# Patient Record
Sex: MALE | Race: WHITE
[De-identification: names, ages, dates, MRNs, and addresses within clinical notes are randomized per-mention and may not be internally consistent; named-entity substitution may affect disease eponyms.]

---

## 2018-01-15 ENCOUNTER — HOSPITAL ENCOUNTER (EMERGENCY)
Dept: HOSPITAL 47 - EC | Age: 38
Discharge: HOME | End: 2018-01-15
Payer: COMMERCIAL

## 2018-01-15 VITALS
SYSTOLIC BLOOD PRESSURE: 168 MMHG | HEART RATE: 83 BPM | TEMPERATURE: 97.5 F | DIASTOLIC BLOOD PRESSURE: 101 MMHG | RESPIRATION RATE: 17 BRPM

## 2018-01-15 DIAGNOSIS — M54.12: Primary | ICD-10-CM

## 2018-01-15 DIAGNOSIS — Z79.899: ICD-10-CM

## 2018-01-15 PROCEDURE — 99283 EMERGENCY DEPT VISIT LOW MDM: CPT

## 2018-01-15 NOTE — ED
General Adult HPI





- General


Chief complaint: Back Pain/Injury


Stated complaint: BACK PAIN


Time Seen by Provider: 01/15/18 08:15


Source: patient, RN notes reviewed


Mode of arrival: ambulatory


Limitations: no limitations





- History of Present Illness


Initial comments: 





37-year-old male who presents emergency room today with chief complaint of pain 

to the left side of his neck.  He states that he has had some pain for last 3 

weeks started when he woke up 3 weeks ago.  He went to a chiropractor which did 

help some.  He states he was working yesterday has had more increased 

irritation was unable to sleep last night.  Patient states is worse with 

movements of the left shoulder he does have some radiation going down left arm 

into proximal forearm area.  States pain worse with rotation of the head and 

neck to the left and right.  He denies any other injuries or traumas.  Denies 

any other complaints associated symptoms. Patient denies any recent fever, 

chills, shortness of breath, chest pain, back pain, abdominal pain, nausea or 

vomiting, dysuria or hematuria, constipation or diarrhea, headaches or visual 

changes, or any other complaints.





- Related Data


 Home Medications











 Medication  Instructions  Recorded  Confirmed


 


Acetaminophen Tab [Tylenol Tab] 650 mg PO Q6H PRN 01/15/18 01/15/18


 


Ibuprofen [Motrin] 200 - 400 mg PO Q6HR PRN 01/15/18 01/15/18


 


Ranitidine HCl [Zantac] 150 mg PO BID 01/15/18 01/15/18








 Previous Rx's











 Medication  Instructions  Recorded


 


Cyclobenzaprine [Flexeril] 10 mg PO TID #20 tab 01/15/18


 


Dexamethasone 0.75 mg PO AS DIRECTED #12 tablet 01/15/18


 


Hydrocodone/Acetaminophen [Norco 1 each PO Q6HR PRN #12 tab 01/15/18





5-325]  


 


Ibuprofen [Motrin] 800 mg PO Q6HR #30 tab 01/15/18











 Allergies











Allergy/AdvReac Type Severity Reaction Status Date / Time


 


No Known Allergies Allergy   Verified 01/15/18 07:56














Review of Systems


ROS Statement: 


Those systems with pertinent positive or pertinent negative responses have been 

documented in the HPI.





ROS Other: All systems not noted in ROS Statement are negative.





Past Medical History


Past Medical History: No Reported History


History of Any Multi-Drug Resistant Organisms: None Reported


Past Surgical History: Cholecystectomy, Orthopedic Surgery


Additional Past Surgical History / Comment(s): sinus


Past Psychological History: No Psychological Hx Reported


Smoking Status: Never smoker


Past Alcohol Use History: None Reported


Past Drug Use History: None Reported





General Exam





- General Exam Comments


Initial Comments: 





General:  The patient is awake and alert, in no distress, and does not appear 

acutely ill. 


Eye:  Pupils are equal, round and reactive to light, extra-ocular movements are 

intact.  No nystagmus.  There is normal conjunctiva bilaterally.  No signs of 

icterus.  


Ears, nose, mouth and throat:  There are moist mucous membranes and no oral 

lesions. 


Neck:  The neck is supple, there is no tenderness or JVD.  


Cardiovascular:  There is a regular rate and rhythm. No murmur, rub or gallop 

is appreciated.


Respiratory:  Lungs are clear to auscultation, respirations are non-labored, 

breath sounds are equal.  No wheezes, stridor, rales, or rhonchi.


Musculoskeletal: Normal appearance of cervical, thoracic, lumbar spine with no 

step-offs forms.  No tenderness midline.  Patient does have tenderness in the 

trapezius area on the left.  Pain reproduced with certain movements and on 

palpation.  Strength 5/5. Sensation intact. Pulses equal bilaterally 2+.  


Neurological:  A&O x 3. CN II-XII intact, There are no obvious motor or sensory 

deficits. Coordination appears grossly intact. Speech is normal.


Skin:  Skin is warm and dry and no rashes or lesions are noted. 


Psychiatric:  Cooperative, appropriate mood & affect, normal judgment.  


Limitations: no limitations





Course





 Vital Signs











  01/15/18





  06:45


 


Temperature 97.5 F L


 


Pulse Rate 83


 


Respiratory 17





Rate 


 


Blood Pressure 168/101


 


O2 Sat by Pulse 96





Oximetry 














Medical Decision Making





- Medical Decision Making





Patient will be treated with pain medication, anti-inflammatories, 

musculoskeletal and steroids.  He is advised follow family doctor or 

orthopedics in the next 2 days to have further evaluation possible MRI.  

Patient must return to the emergency room symptoms increase worsen or for any 

other concerns.  He states understanding and is in agreement.





Disposition


Clinical Impression: 


 Cervical radiculopathy





Disposition: HOME SELF-CARE


Condition: Good


Instructions:  Cervical Radiculopathy (ED)


Additional Instructions: 


Please use medication as discussed.  Please follow-up with orthopedic/family 

doctor in the next 2 days of symptoms have not improved.  Please return to 

emergency room if the symptoms increase or worsen or for any other concerns.


Prescriptions: 


Cyclobenzaprine [Flexeril] 10 mg PO TID #20 tab


Dexamethasone 0.75 mg PO AS DIRECTED #12 tablet


Hydrocodone/Acetaminophen [Norco 5-325] 1 each PO Q6HR PRN #12 tab


 PRN Reason: Pain


Ibuprofen [Motrin] 800 mg PO Q6HR #30 tab


Referrals: 


Adarsh Mark MD [Primary Care Provider] - 1-2 days


MERLYN Alejandro DO [Doctor of Osteopathic Medicine] - 1-2 days


Time of Disposition: 08:28

## 2019-11-27 ENCOUNTER — HOSPITAL ENCOUNTER (EMERGENCY)
Dept: HOSPITAL 47 - EC | Age: 39
Discharge: HOME | End: 2019-11-27
Payer: COMMERCIAL

## 2019-11-27 VITALS — DIASTOLIC BLOOD PRESSURE: 97 MMHG | TEMPERATURE: 98 F | HEART RATE: 86 BPM | SYSTOLIC BLOOD PRESSURE: 151 MMHG

## 2019-11-27 VITALS — RESPIRATION RATE: 16 BRPM

## 2019-11-27 DIAGNOSIS — S29.011A: Primary | ICD-10-CM

## 2019-11-27 DIAGNOSIS — Z79.51: ICD-10-CM

## 2019-11-27 DIAGNOSIS — I45.10: ICD-10-CM

## 2019-11-27 LAB
ANION GAP SERPL CALC-SCNC: 11 MMOL/L
APTT BLD: 25.8 SEC (ref 22–30)
BASOPHILS # BLD AUTO: 0.1 K/UL (ref 0–0.2)
BASOPHILS NFR BLD AUTO: 1 %
BUN SERPL-SCNC: 23 MG/DL (ref 9–20)
CALCIUM SPEC-MCNC: 10.7 MG/DL (ref 8.4–10.2)
CHLORIDE SERPL-SCNC: 105 MMOL/L (ref 98–107)
CO2 SERPL-SCNC: 26 MMOL/L (ref 22–30)
D DIMER PPP FEU-MCNC: <0.17 MG/L FEU (ref ?–0.6)
EOSINOPHIL # BLD AUTO: 0.1 K/UL (ref 0–0.7)
EOSINOPHIL NFR BLD AUTO: 1 %
ERYTHROCYTE [DISTWIDTH] IN BLOOD BY AUTOMATED COUNT: 4.59 M/UL (ref 4.3–5.9)
ERYTHROCYTE [DISTWIDTH] IN BLOOD: 12.6 % (ref 11.5–15.5)
GLUCOSE SERPL-MCNC: 119 MG/DL (ref 74–99)
HCT VFR BLD AUTO: 42.2 % (ref 39–53)
HGB BLD-MCNC: 14.8 GM/DL (ref 13–17.5)
INR PPP: 0.9 (ref ?–1.2)
LYMPHOCYTES # SPEC AUTO: 1.9 K/UL (ref 1–4.8)
LYMPHOCYTES NFR SPEC AUTO: 19 %
MAGNESIUM SPEC-SCNC: 2 MG/DL (ref 1.6–2.3)
MCH RBC QN AUTO: 32.2 PG (ref 25–35)
MCHC RBC AUTO-ENTMCNC: 35.1 G/DL (ref 31–37)
MCV RBC AUTO: 91.8 FL (ref 80–100)
MONOCYTES # BLD AUTO: 0.5 K/UL (ref 0–1)
MONOCYTES NFR BLD AUTO: 5 %
NEUTROPHILS # BLD AUTO: 7.4 K/UL (ref 1.3–7.7)
NEUTROPHILS NFR BLD AUTO: 72 %
PLATELET # BLD AUTO: 277 K/UL (ref 150–450)
POTASSIUM SERPL-SCNC: 4.1 MMOL/L (ref 3.5–5.1)
PT BLD: 10 SEC (ref 9–12)
SODIUM SERPL-SCNC: 142 MMOL/L (ref 137–145)
WBC # BLD AUTO: 10.2 K/UL (ref 3.8–10.6)

## 2019-11-27 PROCEDURE — 83735 ASSAY OF MAGNESIUM: CPT

## 2019-11-27 PROCEDURE — 80048 BASIC METABOLIC PNL TOTAL CA: CPT

## 2019-11-27 PROCEDURE — 85379 FIBRIN DEGRADATION QUANT: CPT

## 2019-11-27 PROCEDURE — 71046 X-RAY EXAM CHEST 2 VIEWS: CPT

## 2019-11-27 PROCEDURE — 99285 EMERGENCY DEPT VISIT HI MDM: CPT

## 2019-11-27 PROCEDURE — 93005 ELECTROCARDIOGRAM TRACING: CPT

## 2019-11-27 PROCEDURE — 85730 THROMBOPLASTIN TIME PARTIAL: CPT

## 2019-11-27 PROCEDURE — 84484 ASSAY OF TROPONIN QUANT: CPT

## 2019-11-27 PROCEDURE — 85610 PROTHROMBIN TIME: CPT

## 2019-11-27 PROCEDURE — 36415 COLL VENOUS BLD VENIPUNCTURE: CPT

## 2019-11-27 PROCEDURE — 85025 COMPLETE CBC W/AUTO DIFF WBC: CPT

## 2019-11-27 NOTE — XR
EXAMINATION TYPE: XR chest 2V

 

DATE OF EXAM: 11/27/2019

 

COMPARISON: None

 

INDICATION: Abnormal EKG

 

TECHNIQUE:  Frontal and lateral views of the chest are obtained.

 

FINDINGS:  

The heart size is normal.  

The pulmonary vasculature is normal.

The lungs are clear.

 

IMPRESSION:  

1. No acute pulmonary process.

## 2019-11-27 NOTE — ED
General Adult HPI





- General


Chief complaint: Recheck/Abnormal Lab/Rx


Stated complaint: abn EKG


Time Seen by Provider: 11/27/19 09:08


Source: patient


Mode of arrival: ambulatory


Limitations: no limitations





- History of Present Illness


Initial comments: 





Dictation was produced using dragon dictation software. please excuse any 

grammatical, word or spelling errors. 





Chief Complaint: 39-year-old male sent in from urgent care for abnormal EKG.





History of Present Illness:-year-old male U was seen at the urgent care earlier 

today.  He was seen at the clinic for chest pain and shortness of breath.  

Patient has been having URI type symptoms with cough productive of green sputum.

 Patient also has been having runny nose.  Patient was evaluated at the urgent 

care and EKG was performed showing abnormality.  Patient was then referred to 

come to the emergency department for further care.  Patient states he has some 

mild chest pain that is sharp and worse with coughing..  No radiation.  No 

associated diaphoresis or nausea.  Patient has a history of blood clots.  Denies

any lower extremity symptoms.








The ROS documented in this emergency department record has been reviewed and 

confirmed by me.  Those systems with pertinent positive or negative responses 

have been documented in the HPI.  All other systems are other negative and/or 

noncontributory.








PHYSICAL EXAM:


General Impression: Alert and oriented x3, not in acute distress


HEENT: Normocephalic atraumatic, extra-ocular movements intact, pupils equal and

reactive to light bilaterally, mucous membranes moist.


Cardiovascular: Heart regular rate and rhythm, S1&S2 audible, no murmurs, rubs 

or gallops


Chest: Lungs clear to auscultation bilaterally, no rhonchi, no wheeze, no rales


Abdomen: Bowel sounds present, abdomen soft, non-tender, non-distended, no 

organomegaly


Musculoskeletal: Pulses present and equal in all extremities, no peripheral 

edema


Motor:  no focal deficits noted


Neurological: CN II-XII grossly intact, no focal motor or sensory deficits noted


Skin: Intact with no visualized rashes


Psych: Normal affect and mood





ED course: 39 old male presents with abnormal EKG.  He reports URI symptoms.  

Upon arrival are within acceptable limits.  EKG from clinic was reviewed showing

incomplete.  On a branch block with a QRS of 107.  There appears to be some ST 

depressions in the lateral precordial leads.  EKG performed at our facility 

showing redemonstration of incomplete right bundle branch block without any 

signs of ischemia or infarction.  Laboratory evaluation obtained found to be 

unremarkable.  D-dimer is negative.  Troponins negative.  for pulmonary embolus.

 patient's pain is atypical.  ekg appears benign.  patient told to follow up 

with primary care physician upon discharge.  is given prescriptions for 

zithromax pack.  Return parameters discussed.  All questions answered.  Patient 

clear for discharge.








EKG interpretation: Ventricular rate 94, normal sinus rhythm, and complete right

bundle branch block, WI interval 140, care is 102, QTc 455. No WI prolongation, 

no QTC prolongation, no ST or T-wave changes noted.  No old EKG for comparison. 

Overall, this EKG is unremarkable








- Related Data


                                Home Medications











 Medication  Instructions  Recorded  Confirmed


 


Acetaminophen Tab [Tylenol Tab] 1,000 mg PO Q6HR PRN 11/27/19 11/27/19


 


Fluticasone/Vilanterol [Breo 1 puff INHALATION RT-DAILY 11/27/19 11/27/19





Ellipta 100-25 Mcg Inhaler]   


 


Ibuprofen [Motrin Ib] 800 mg PO Q6H PRN 11/27/19 11/27/19








                                  Previous Rx's











 Medication  Instructions  Recorded


 


Azithromycin [Zithromax Z-pack] 0 mg PO AS DIRECTED #6 tab 11/27/19











                                    Allergies











Allergy/AdvReac Type Severity Reaction Status Date / Time


 


No Known Allergies Allergy   Verified 11/27/19 10:14














Review of Systems


ROS Statement: 


Those systems with pertinent positive or pertinent negative responses have been 

documented in the HPI.





ROS Other: All systems not noted in ROS Statement are negative.





Past Medical History


Past Medical History: No Reported History


History of Any Multi-Drug Resistant Organisms: None Reported


Past Surgical History: Cholecystectomy, Orthopedic Surgery


Additional Past Surgical History / Comment(s): sinus


Past Psychological History: No Psychological Hx Reported


Smoking Status: Never smoker


Past Alcohol Use History: None Reported


Past Drug Use History: None Reported





General Exam


Limitations: no limitations





Course


                                   Vital Signs











  11/27/19 11/27/19





  09:04 09:25


 


Temperature 98.3 F 


 


Pulse Rate 90 84


 


Respiratory 18 16





Rate  


 


Blood Pressure 146/89 


 


O2 Sat by Pulse 99 99





Oximetry  














Medical Decision Making





- Lab Data


Result diagrams: 


                                 11/27/19 09:22





                                 11/27/19 09:22


                                   Lab Results











  11/27/19 11/27/19 11/27/19 Range/Units





  09:22 09:22 09:22 


 


WBC   10.2   (3.8-10.6)  k/uL


 


RBC   4.59   (4.30-5.90)  m/uL


 


Hgb   14.8   (13.0-17.5)  gm/dL


 


Hct   42.2   (39.0-53.0)  %


 


MCV   91.8   (80.0-100.0)  fL


 


MCH   32.2   (25.0-35.0)  pg


 


MCHC   35.1   (31.0-37.0)  g/dL


 


RDW   12.6   (11.5-15.5)  %


 


Plt Count   277   (150-450)  k/uL


 


Neutrophils %   72   %


 


Lymphocytes %   19   %


 


Monocytes %   5   %


 


Eosinophils %   1   %


 


Basophils %   1   %


 


Neutrophils #   7.4   (1.3-7.7)  k/uL


 


Lymphocytes #   1.9   (1.0-4.8)  k/uL


 


Monocytes #   0.5   (0-1.0)  k/uL


 


Eosinophils #   0.1   (0-0.7)  k/uL


 


Basophils #   0.1   (0-0.2)  k/uL


 


PT    10.0  (9.0-12.0)  sec


 


INR    0.9  (<1.2)  


 


APTT    25.8  (22.0-30.0)  sec


 


D-Dimer    <0.17  (<0.60)  mg/L FEU


 


Sodium  142    (137-145)  mmol/L


 


Potassium  4.1    (3.5-5.1)  mmol/L


 


Chloride  105    ()  mmol/L


 


Carbon Dioxide  26    (22-30)  mmol/L


 


Anion Gap  11    mmol/L


 


BUN  23 H    (9-20)  mg/dL


 


Creatinine  0.84    (0.66-1.25)  mg/dL


 


Est GFR (CKD-EPI)AfAm  >90    (>60 ml/min/1.73 sqM)  


 


Est GFR (CKD-EPI)NonAf  >90    (>60 ml/min/1.73 sqM)  


 


Glucose  119 H    (74-99)  mg/dL


 


Calcium  10.7 H    (8.4-10.2)  mg/dL


 


Magnesium  2.0    (1.6-2.3)  mg/dL


 


Troponin I     (0.000-0.034)  ng/mL














  11/27/19 Range/Units





  09:22 


 


WBC   (3.8-10.6)  k/uL


 


RBC   (4.30-5.90)  m/uL


 


Hgb   (13.0-17.5)  gm/dL


 


Hct   (39.0-53.0)  %


 


MCV   (80.0-100.0)  fL


 


MCH   (25.0-35.0)  pg


 


MCHC   (31.0-37.0)  g/dL


 


RDW   (11.5-15.5)  %


 


Plt Count   (150-450)  k/uL


 


Neutrophils %   %


 


Lymphocytes %   %


 


Monocytes %   %


 


Eosinophils %   %


 


Basophils %   %


 


Neutrophils #   (1.3-7.7)  k/uL


 


Lymphocytes #   (1.0-4.8)  k/uL


 


Monocytes #   (0-1.0)  k/uL


 


Eosinophils #   (0-0.7)  k/uL


 


Basophils #   (0-0.2)  k/uL


 


PT   (9.0-12.0)  sec


 


INR   (<1.2)  


 


APTT   (22.0-30.0)  sec


 


D-Dimer   (<0.60)  mg/L FEU


 


Sodium   (137-145)  mmol/L


 


Potassium   (3.5-5.1)  mmol/L


 


Chloride   ()  mmol/L


 


Carbon Dioxide   (22-30)  mmol/L


 


Anion Gap   mmol/L


 


BUN   (9-20)  mg/dL


 


Creatinine   (0.66-1.25)  mg/dL


 


Est GFR (CKD-EPI)AfAm   (>60 ml/min/1.73 sqM)  


 


Est GFR (CKD-EPI)NonAf   (>60 ml/min/1.73 sqM)  


 


Glucose   (74-99)  mg/dL


 


Calcium   (8.4-10.2)  mg/dL


 


Magnesium   (1.6-2.3)  mg/dL


 


Troponin I  <0.012  (0.000-0.034)  ng/mL














Disposition


Clinical Impression: 


 Chest wall muscle strain





Disposition: HOME SELF-CARE


Condition: Good


Instructions (If sedation given, give patient instructions):  Chest Pain (ED)


Prescriptions: 


Azithromycin [Zithromax Z-pack] 0 mg PO AS DIRECTED #6 tab


Is patient prescribed a controlled substance at d/c from ED?: No


Referrals: 


Kale Bauman MD [Primary Care Provider] - 1-2 days


Time of Disposition: 11:01

## 2021-06-02 ENCOUNTER — HOSPITAL ENCOUNTER (OUTPATIENT)
Dept: HOSPITAL 47 - LABWHC1 | Age: 41
Discharge: HOME | End: 2021-06-02
Attending: FAMILY MEDICINE
Payer: COMMERCIAL

## 2021-06-02 DIAGNOSIS — E23.0: ICD-10-CM

## 2021-06-02 DIAGNOSIS — T78.1XXA: ICD-10-CM

## 2021-06-02 DIAGNOSIS — Z00.00: Primary | ICD-10-CM

## 2021-06-02 LAB
BASOPHILS # BLD AUTO: 0.09 X 10*3/UL (ref 0–0.1)
BASOPHILS NFR BLD AUTO: 0.7 %
EOSINOPHIL # BLD AUTO: 0.08 X 10*3/UL (ref 0.04–0.35)
EOSINOPHIL NFR BLD AUTO: 0.6 %
ERYTHROCYTE [DISTWIDTH] IN BLOOD BY AUTOMATED COUNT: 4.92 X 10*6/UL (ref 4.4–5.6)
ERYTHROCYTE [DISTWIDTH] IN BLOOD: 13.2 % (ref 11.5–14.5)
GLIADIN IGA SER-ACNC: <0.2 U/ML
GLIADIN PEPTIDE IGA SER-ACNC: NEGATIVE
GLIADIN PEPTIDE IGG SER-ACNC: NEGATIVE
HBA1C MFR BLD: 5.5 % (ref 4–6)
HCT VFR BLD AUTO: 46.8 % (ref 39.6–50)
HGB BLD-MCNC: 15 G/DL (ref 13–17)
LYMPHOCYTES # SPEC AUTO: 3.48 X 10*3/UL (ref 0.9–5)
LYMPHOCYTES NFR SPEC AUTO: 27.2 %
MCH RBC QN AUTO: 30.5 PG (ref 27–32)
MCHC RBC AUTO-ENTMCNC: 32.1 G/DL (ref 32–37)
MCV RBC AUTO: 95.1 FL (ref 80–97)
MONOCYTES # BLD AUTO: 0.84 X 10*3/UL (ref 0.2–1)
MONOCYTES NFR BLD AUTO: 6.6 %
NEUTROPHILS # BLD AUTO: 7.97 X 10*3/UL (ref 1.8–7.7)
NEUTROPHILS NFR BLD AUTO: 62.2 %
PLATELET # BLD AUTO: 267 X 10*3/UL (ref 140–440)
WBC # BLD AUTO: 12.8 X 10*3/UL (ref 4.5–10)

## 2021-06-02 PROCEDURE — 83036 HEMOGLOBIN GLYCOSYLATED A1C: CPT

## 2021-06-02 PROCEDURE — 80053 COMPREHEN METABOLIC PANEL: CPT

## 2021-06-02 PROCEDURE — 83516 IMMUNOASSAY NONANTIBODY: CPT

## 2021-06-02 PROCEDURE — 84402 ASSAY OF FREE TESTOSTERONE: CPT

## 2021-06-02 PROCEDURE — 82672 ASSAY OF ESTROGEN: CPT

## 2021-06-02 PROCEDURE — 84443 ASSAY THYROID STIM HORMONE: CPT

## 2021-06-02 PROCEDURE — 82785 ASSAY OF IGE: CPT

## 2021-06-02 PROCEDURE — 84153 ASSAY OF PSA TOTAL: CPT

## 2021-06-02 PROCEDURE — 85025 COMPLETE CBC W/AUTO DIFF WBC: CPT

## 2021-06-02 PROCEDURE — 36415 COLL VENOUS BLD VENIPUNCTURE: CPT

## 2021-06-02 PROCEDURE — 84403 ASSAY OF TOTAL TESTOSTERONE: CPT

## 2021-06-02 PROCEDURE — 80061 LIPID PANEL: CPT

## 2021-06-02 PROCEDURE — 86001 ALLERGEN SPECIFIC IGG: CPT

## 2021-06-03 LAB
ALBUMIN SERPL-MCNC: 4.7 G/DL (ref 3.8–4.9)
ALBUMIN/GLOB SERPL: 1.88 G/DL (ref 1.6–3.17)
ALP SERPL-CCNC: 61 U/L (ref 41–126)
ALT SERPL-CCNC: 114 U/L (ref 10–49)
ANION GAP SERPL CALC-SCNC: 13 MMOL/L (ref 4–12)
AST SERPL-CCNC: 68 U/L (ref 14–35)
BUN SERPL-SCNC: 25 MG/DL (ref 9–27)
BUN/CREAT SERPL: 25 RATIO (ref 12–20)
CALCIUM SPEC-MCNC: 10 MG/DL (ref 8.7–10.3)
CHLORIDE SERPL-SCNC: 105 MMOL/L (ref 96–109)
CHOLEST SERPL-MCNC: 181 MG/DL (ref 0–200)
CO2 SERPL-SCNC: 27 MMOL/L (ref 21.6–31.8)
GLOBULIN SER CALC-MCNC: 2.5 G/DL (ref 1.6–3.3)
GLUCOSE SERPL-MCNC: 96 MG/DL (ref 70–110)
HDLC SERPL-MCNC: 49 MG/DL (ref 40–60)
LDLC SERPL CALC-MCNC: 108.2 MG/DL (ref 0–131)
POTASSIUM SERPL-SCNC: 4.5 MMOL/L (ref 3.5–5.5)
PROT SERPL-MCNC: 7.2 G/DL (ref 6.2–8.2)
PSA SERPL-MCNC: 0.6 NG/ML (ref 0–2.5)
SODIUM SERPL-SCNC: 145 MMOL/L (ref 135–145)
TRIGL SERPL-MCNC: 119 MG/DL (ref 0–149)
VLDLC SERPL CALC-MCNC: 23.8 MG/DL (ref 5–40)

## 2021-06-04 LAB
COW MILK IGG-MCNC: 52.1 MCG/ML (ref ?–2)
WHEAT IGG-MCNC: 4 MCG/ML (ref ?–2)

## 2021-06-14 ENCOUNTER — HOSPITAL ENCOUNTER (EMERGENCY)
Dept: HOSPITAL 47 - EC | Age: 41
Discharge: HOME | End: 2021-06-14
Payer: COMMERCIAL

## 2021-06-14 VITALS
HEART RATE: 88 BPM | SYSTOLIC BLOOD PRESSURE: 143 MMHG | DIASTOLIC BLOOD PRESSURE: 96 MMHG | TEMPERATURE: 97.9 F | RESPIRATION RATE: 20 BRPM

## 2021-06-14 DIAGNOSIS — M54.16: Primary | ICD-10-CM

## 2021-06-14 PROCEDURE — 99283 EMERGENCY DEPT VISIT LOW MDM: CPT

## 2021-06-14 PROCEDURE — 72100 X-RAY EXAM L-S SPINE 2/3 VWS: CPT

## 2021-06-14 PROCEDURE — 96372 THER/PROPH/DIAG INJ SC/IM: CPT

## 2021-06-14 NOTE — XR
EXAMINATION TYPE: XR lumbar spine 2 or 3V

 

DATE OF EXAM: 6/14/2021

 

COMPARISON: NONE

 

HISTORY: Low back pain

 

TECHNIQUE: 3 views lumbar spine

 

FINDINGS: 

There are 4 nonrib-bearing lumbar-type vertebral bodies. Vertebral body heights are preserved. Multil
evel endplate sclerosis and osteophytosis is noted predominantly at the thoracolumbar junction.

 

IMPRESSION: 

1. 4 nonrib-bearing lumbar type vertebral bodies.

2. Multilevel disc disease.

## 2021-06-14 NOTE — ED
Back Pain HPI





- General


Chief Complaint: Back Pain/Injury


Stated Complaint: back pain


Time Seen by Provider: 06/14/21 13:55


Source: patient, EMS


Limitations: no limitations





- History of Present Illness


Initial Comments: 





40-year-old male presents to the emergency department with a chief complaint of 

back pain.  He has history of chronic back pain that comes and goes.  States thi

s most recent bout started approximately 2 weeks ago with gradual increase in 

severity.  States it is located on the left paraspinal lumbar region with 

radiation to the left lower extremity.  He states he is not able to ambulate.  

Pain exacerbated with any weightbearing or twisting motion on his back.  He 

denies any direct injuries to the back.  Denies saddle anesthesia, urinary 

retention with overflow or bowel incontinence.





- Related Data


                                Home Medications











 Medication  Instructions  Recorded  Confirmed


 


Cyclobenzaprine [Flexeril] 10 mg PO Q8H PRN 06/14/21 06/14/21


 


Ibuprofen [Motrin] 800 mg PO Q8H PRN 06/14/21 06/14/21


 


Omeprazole 20 mg PO DAILY 06/14/21 06/14/21











                                    Allergies











Allergy/AdvReac Type Severity Reaction Status Date / Time


 


No Known Allergies Allergy   Verified 06/14/21 13:57














Review of Systems


ROS Statement: 


Those systems with pertinent positive or pertinent negative responses have been 

documented in the HPI.





ROS Other: All systems not noted in ROS Statement are negative.





Past Medical History


Past Medical History: No Reported History


History of Any Multi-Drug Resistant Organisms: None Reported


Past Surgical History: Cholecystectomy, Orthopedic Surgery


Additional Past Surgical History / Comment(s): sinus


Past Psychological History: No Psychological Hx Reported


Past Alcohol Use History: None Reported


Past Drug Use History: None Reported





General Exam


Limitations: no limitations


General appearance: alert, in no apparent distress, obese


Head exam: Present: atraumatic, normocephalic, normal inspection


Eye exam: Present: normal appearance, PERRL, EOMI


Pupils: Present: normal accommodation


ENT exam: Present: normal exam, normal oropharynx, mucous membranes moist


Neck exam: Present: normal inspection, full ROM.  Absent: tenderness, 

lymphadenopathy


Respiratory exam: Present: normal lung sounds bilaterally.  Absent: respiratory 

distress


Cardiovascular Exam: Present: regular rate, normal rhythm, normal heart sounds. 

 Absent: systolic murmur


GI/Abdominal exam: Present: soft.  Absent: distended, tenderness, guarding, 

rigid


Extremities exam: Present: normal inspection, full ROM, normal capillary refill.

  Absent: tenderness


Back exam: Present: normal inspection, full ROM, tenderness, paraspinal 

tenderness (left paraspinal tenderness), other (Positive leg raise test)


Neurological exam: Present: alert, oriented X3


Psychiatric exam: Present: normal affect, normal mood


Skin exam: Present: warm, dry, intact, normal color





Course


                                   Vital Signs











  06/14/21





  13:36


 


Temperature 97.9 F


 


Pulse Rate 88


 


Respiratory 20





Rate 


 


Blood Pressure 143/96


 


O2 Sat by Pulse 95





Oximetry 














Disposition


Clinical Impression: 


 Mechanical back pain, Lumbar radiculopathy





Disposition: HOME SELF-CARE


Condition: Stable


Instructions (If sedation given, give patient instructions):  Acute Low Back 

Pain (ED)


Additional Instructions: 


Please return to the Emergency Department if symptoms worsen or any other 

concerns.  Follow-up with orthopedic specialist.  Take prescribed medication as 

directed.


Is patient prescribed a controlled substance at d/c from ED?: No


Referrals: 


Kale Bauman MD [Primary Care Provider] - 1-2 days


Rodolfo Kauffman DO [Doctor of Osteopathic Medicine] - 1-2 days


Time of Disposition: 15:11

## 2021-06-28 ENCOUNTER — HOSPITAL ENCOUNTER (OUTPATIENT)
Dept: HOSPITAL 47 - RADMRIMAIN | Age: 41
Discharge: HOME | End: 2021-06-28
Attending: FAMILY MEDICINE
Payer: COMMERCIAL

## 2021-06-28 DIAGNOSIS — M51.17: Primary | ICD-10-CM

## 2021-06-28 PROCEDURE — 72148 MRI LUMBAR SPINE W/O DYE: CPT

## 2021-06-29 NOTE — MR
EXAMINATION TYPE: MR lumbar spine wo con

 

DATE OF EXAM: 6/28/2021

 

COMPARISON: None

 

HISTORY: LBP, LLE radiculopathy.

 

Multiplanar multiecho imaging of the lumbar spine was performed without contrast.

 

The vertebra have normal alignment. There is some decreased signal in the L5-S1 disc. Disc spaces ove
rall are fairly normal. There is no compression fracture. There is no paraspinal mass. Sacroiliac baldemar
nts are intact. The posterior elements are intact. There is a small posterior disc bulge at L5-S1.

 

IMPRESSION:

Small posterior disc bulging at L5-S1. No fracture. There is developmentally small spinal canal and t
his patient is at higher risk for acquired spinal stenosis.

## 2022-08-30 ENCOUNTER — HOSPITAL ENCOUNTER (EMERGENCY)
Dept: HOSPITAL 47 - EC | Age: 42
Discharge: HOME | End: 2022-08-30
Payer: COMMERCIAL

## 2022-08-30 VITALS
DIASTOLIC BLOOD PRESSURE: 71 MMHG | RESPIRATION RATE: 18 BRPM | HEART RATE: 98 BPM | TEMPERATURE: 98.9 F | SYSTOLIC BLOOD PRESSURE: 108 MMHG

## 2022-08-30 DIAGNOSIS — K57.32: Primary | ICD-10-CM

## 2022-08-30 LAB
BASOPHILS # BLD AUTO: 0 K/UL (ref 0–0.2)
BASOPHILS NFR BLD AUTO: 0 %
EOSINOPHIL # BLD AUTO: 0.3 K/UL (ref 0–0.7)
EOSINOPHIL NFR BLD AUTO: 2 %
ERYTHROCYTE [DISTWIDTH] IN BLOOD BY AUTOMATED COUNT: 5.11 M/UL (ref 4.3–5.9)
ERYTHROCYTE [DISTWIDTH] IN BLOOD: 13.2 % (ref 11.5–15.5)
HCT VFR BLD AUTO: 48.4 % (ref 39–53)
HGB BLD-MCNC: 16 GM/DL (ref 13–17.5)
LYMPHOCYTES # SPEC AUTO: 1 K/UL (ref 1–4.8)
LYMPHOCYTES NFR SPEC AUTO: 5 %
MCH RBC QN AUTO: 31.2 PG (ref 25–35)
MCHC RBC AUTO-ENTMCNC: 33 G/DL (ref 31–37)
MCV RBC AUTO: 94.7 FL (ref 80–100)
MONOCYTES # BLD AUTO: 0.5 K/UL (ref 0–1)
MONOCYTES NFR BLD AUTO: 3 %
NEUTROPHILS # BLD AUTO: 16.4 K/UL (ref 1.3–7.7)
NEUTROPHILS NFR BLD AUTO: 90 %
PLATELET # BLD AUTO: 229 K/UL (ref 150–450)
WBC # BLD AUTO: 18.2 K/UL (ref 3.8–10.6)

## 2022-08-30 PROCEDURE — 96374 THER/PROPH/DIAG INJ IV PUSH: CPT

## 2022-08-30 PROCEDURE — 74177 CT ABD & PELVIS W/CONTRAST: CPT

## 2022-08-30 PROCEDURE — 85025 COMPLETE CBC W/AUTO DIFF WBC: CPT

## 2022-08-30 PROCEDURE — 36415 COLL VENOUS BLD VENIPUNCTURE: CPT

## 2022-08-30 PROCEDURE — 96361 HYDRATE IV INFUSION ADD-ON: CPT

## 2022-08-30 PROCEDURE — 99284 EMERGENCY DEPT VISIT MOD MDM: CPT

## 2022-08-30 PROCEDURE — 96375 TX/PRO/DX INJ NEW DRUG ADDON: CPT

## 2022-08-30 NOTE — CT
EXAMINATION TYPE: CT abdomen pelvis w con

CT DLP: 2696.4 mGycm, Automated exposure control for dose reduction was used.

 

DATE OF EXAM: 8/30/2022 8:34 AM

 

COMPARISON: None

 

CLINICAL INDICATION:Male, 41 years old with history of abdominal pain; Abdominal pain

 

TECHNIQUE:  Axial CT of the abdomen and pelvis. Sagittal and coronal reformats were created on a [employer] workstation. 

 

Contrast used:100 mL of Isovue 300 with IV Contrast, 

Oral contrast used: without Oral Contrast 

 

FINDINGS: 

LOWER CHEST: Unremarkable

 

ABDOMEN

LIVER: Diffusely hypoattenuating parenchyma.

GALLBLADDER AND BILE DUCTS: The gallbladder is surgically absent.

PANCREAS: Unremarkable.

SPLEEN: Unremarkable.

ADRENAL GLANDS: Unremarkable.

KIDNEYS AND URETERS: No evidence of hydronephrosis or renal calculus. The ureters are unremarkable.  
Right renal cyst.

 

PELVIS

BLADDER: Unremarkable

REPRODUCTIVE: Unremarkable.

 

ABDOMEN & PELVIS

STOMACH AND BOWEL: There are colonic diverticula present, one of which has adjacent fat stranding felecia
nges. No organizing fluid collection or evidence of pneumoperitoneum. No evidence of bowel obstructio
n. 

PERITONEUM: No evidence of pneumoperitoneum or free fluid.

 

VASCULATURE: No evidence of aortic aneurysm. 

MUSCULOSKELETAL: No acute osseous abnormalities

LYMPH NODES: No gross evidence for lymphadenopathy.

SOFT TISSUE/ABDOMINAL WALL: Bilateral fat filled inguinal hernias. Small fat-containing umbilical her
moncho.

 

IMPRESSION:

1.  Acute uncomplicated sigmoid colon diverticulitis.

## 2022-08-30 NOTE — ED
General Adult HPI





- General


Stated complaint: Abdominal Pain


Time Seen by Provider: 08/30/22 06:35


Source: patient, EMS


Mode of arrival: EMS


Limitations: no limitations





- History of Present Illness


Initial comments: 


41-year-old male presents emergency department via EMS with chief complaint of 

abdominal pain.  Patient states that pain started yesterday worsened throughout 

the night.  Patient states started in his upper abdomen states is not located in

her lower abdomen bilaterally and rates his back.  Patient states severe pain is

did receive fentanyl by EMS which minimally helped.  Patient has no dysuria no 

hematuria.  Patient states that he has had a prior cholecystomy, appendectomy.  

Patient denies any fevers or chills slight nausea was given Zofran.  No chest 

pain or shortness breath at this time.








- Related Data


                                Home Medications











 Medication  Instructions  Recorded  Confirmed


 


Cyclobenzaprine [Flexeril] 10 mg PO Q8H PRN 06/14/21 06/14/21


 


Ibuprofen [Motrin] 800 mg PO Q8H PRN 06/14/21 06/14/21


 


Omeprazole 20 mg PO DAILY 06/14/21 06/14/21








                                  Previous Rx's











 Medication  Instructions  Recorded


 


Amoxic-Pot Clav 875-125Mg 1 tab PO Q12HR #20 tab 08/30/22





[Augmentin 875-125]  


 


Ondansetron Odt [Zofran Odt] 4 mg PO Q8HR PRN #10 tab 08/30/22











                                    Allergies











Allergy/AdvReac Type Severity Reaction Status Date / Time


 


No Known Allergies Allergy   Verified 06/14/21 13:57














Review of Systems


ROS Statement: 


Those systems with pertinent positive or pertinent negative responses have been 

documented in the HPI.





ROS Other: All systems not noted in ROS Statement are negative.





Past Medical History


Past Medical History: No Reported History


History of Any Multi-Drug Resistant Organisms: None Reported


Past Surgical History: Cholecystectomy, Orthopedic Surgery


Additional Past Surgical History / Comment(s): sinus


Past Psychological History: No Psychological Hx Reported


Past Alcohol Use History: None Reported


Past Drug Use History: None Reported





General Exam


General appearance: alert, in no apparent distress


Head exam: Present: atraumatic, normocephalic, normal inspection


Eye exam: Present: normal appearance, PERRL, EOMI.  Absent: scleral icterus, 

conjunctival injection, periorbital swelling


ENT exam: Present: normal exam, normal oropharynx, mucous membranes moist


Neck exam: Present: normal inspection, full ROM.  Absent: tenderness, 

meningismus, lymphadenopathy


Respiratory exam: Present: normal lung sounds bilaterally.  Absent: respiratory 

distress, wheezes, rales, rhonchi, stridor


Cardiovascular Exam: Present: regular rate, normal rhythm, normal heart sounds. 

Absent: systolic murmur, diastolic murmur, rubs, gallop, clicks


GI/Abdominal exam: Present: soft, tenderness, normal bowel sounds.  Absent: 

distended, guarding, rebound, rigid


Neurological exam: Present: alert, oriented X3, CN II-XII intact


Skin exam: Present: warm, dry, intact, normal color.  Absent: rash





Course


                                   Vital Signs











  08/30/22





  07:01


 


Temperature 99.1 F


 


Pulse Rate 107 H


 


Respiratory 20





Rate 


 


Blood Pressure 108/77


 


O2 Sat by Pulse 94 L





Oximetry 














Medical Decision Making





- Medical Decision Making


41-year-old male present emergency department with complaints of lower abdominal

pain, CAT scan shows evidence of acute diverticulitis patient has mild 

leukocytosis.  There is no abscess or perforation noted.  Patient will be 

discharged on clear liquid diet, oral antibiotics, pain control return 

parameters were discussed.








- Lab Data


Result diagrams: 


                                 08/30/22 07:18





                                   Lab Results











  08/30/22 Range/Units





  07:18 


 


WBC  18.2 H  (3.8-10.6)  k/uL


 


RBC  5.11  (4.30-5.90)  m/uL


 


Hgb  16.0  (13.0-17.5)  gm/dL


 


Hct  48.4  (39.0-53.0)  %


 


MCV  94.7  (80.0-100.0)  fL


 


MCH  31.2  (25.0-35.0)  pg


 


MCHC  33.0  (31.0-37.0)  g/dL


 


RDW  13.2  (11.5-15.5)  %


 


Plt Count  229  (150-450)  k/uL


 


MPV  9.5  


 


Neutrophils %  90  %


 


Lymphocytes %  5  %


 


Monocytes %  3  %


 


Eosinophils %  2  %


 


Basophils %  0  %


 


Neutrophils #  16.4 H  (1.3-7.7)  k/uL


 


Lymphocytes #  1.0  (1.0-4.8)  k/uL


 


Monocytes #  0.5  (0-1.0)  k/uL


 


Eosinophils #  0.3  (0-0.7)  k/uL


 


Basophils #  0.0  (0-0.2)  k/uL














Disposition


Clinical Impression: 


 Diverticulitis





Disposition: HOME SELF-CARE


Condition: Stable


Instructions (If sedation given, give patient instructions):  Diverticulitis 

(ED), Diverticulitis Diet (ED)


Additional Instructions: 


Please return to the Emergency Department if symptoms worsen or any other 

concerns.


Prescriptions: 


Amoxic-Pot Clav 875-125Mg [Augmentin 875-125] 1 tab PO Q12HR #20 tab


Ondansetron Odt [Zofran Odt] 4 mg PO Q8HR PRN #10 tab


 PRN Reason: Nausea


Is patient prescribed a controlled substance at d/c from ED?: No


Referrals: 


Kale Bauman MD [Primary Care Provider] - 1-2 days


Time of Disposition: 08:54

## 2022-08-31 ENCOUNTER — HOSPITAL ENCOUNTER (INPATIENT)
Dept: HOSPITAL 47 - EC | Age: 42
LOS: 17 days | Discharge: HOME HEALTH | DRG: 854 | End: 2022-09-17
Attending: INTERNAL MEDICINE | Admitting: INTERNAL MEDICINE
Payer: COMMERCIAL

## 2022-08-31 VITALS — BODY MASS INDEX: 42.5 KG/M2

## 2022-08-31 DIAGNOSIS — R74.8: ICD-10-CM

## 2022-08-31 DIAGNOSIS — E44.0: ICD-10-CM

## 2022-08-31 DIAGNOSIS — E55.9: ICD-10-CM

## 2022-08-31 DIAGNOSIS — I87.8: ICD-10-CM

## 2022-08-31 DIAGNOSIS — E66.01: ICD-10-CM

## 2022-08-31 DIAGNOSIS — Z84.1: ICD-10-CM

## 2022-08-31 DIAGNOSIS — R60.0: ICD-10-CM

## 2022-08-31 DIAGNOSIS — K59.00: ICD-10-CM

## 2022-08-31 DIAGNOSIS — K66.8: ICD-10-CM

## 2022-08-31 DIAGNOSIS — Z90.49: ICD-10-CM

## 2022-08-31 DIAGNOSIS — K66.0: ICD-10-CM

## 2022-08-31 DIAGNOSIS — M79.89: ICD-10-CM

## 2022-08-31 DIAGNOSIS — I08.0: ICD-10-CM

## 2022-08-31 DIAGNOSIS — K57.20: ICD-10-CM

## 2022-08-31 DIAGNOSIS — R26.2: ICD-10-CM

## 2022-08-31 DIAGNOSIS — R74.01: ICD-10-CM

## 2022-08-31 DIAGNOSIS — N28.1: ICD-10-CM

## 2022-08-31 DIAGNOSIS — E87.6: ICD-10-CM

## 2022-08-31 DIAGNOSIS — A41.9: Primary | ICD-10-CM

## 2022-08-31 DIAGNOSIS — R94.31: ICD-10-CM

## 2022-08-31 DIAGNOSIS — I77.810: ICD-10-CM

## 2022-08-31 LAB
ALBUMIN SERPL-MCNC: 4 G/DL (ref 3.5–5)
ALP SERPL-CCNC: 85 U/L (ref 38–126)
ALT SERPL-CCNC: 118 U/L (ref 4–49)
ANION GAP SERPL CALC-SCNC: 13 MMOL/L
AST SERPL-CCNC: 79 U/L (ref 17–59)
BASOPHILS # BLD AUTO: 0.1 K/UL (ref 0–0.2)
BASOPHILS NFR BLD AUTO: 0 %
BUN SERPL-SCNC: 14 MG/DL (ref 9–20)
CALCIUM SPEC-MCNC: 8.5 MG/DL (ref 8.4–10.2)
CHLORIDE SERPL-SCNC: 99 MMOL/L (ref 98–107)
CO2 SERPL-SCNC: 22 MMOL/L (ref 22–30)
EOSINOPHIL # BLD AUTO: 0.3 K/UL (ref 0–0.7)
EOSINOPHIL NFR BLD AUTO: 1 %
ERYTHROCYTE [DISTWIDTH] IN BLOOD BY AUTOMATED COUNT: 4.84 M/UL (ref 4.3–5.9)
ERYTHROCYTE [DISTWIDTH] IN BLOOD: 12.8 % (ref 11.5–15.5)
GLUCOSE SERPL-MCNC: 108 MG/DL (ref 74–99)
HCT VFR BLD AUTO: 45.7 % (ref 39–53)
HGB BLD-MCNC: 14.6 GM/DL (ref 13–17.5)
LYMPHOCYTES # SPEC AUTO: 0.9 K/UL (ref 1–4.8)
LYMPHOCYTES NFR SPEC AUTO: 5 %
MCH RBC QN AUTO: 30.2 PG (ref 25–35)
MCHC RBC AUTO-ENTMCNC: 31.9 G/DL (ref 31–37)
MCV RBC AUTO: 94.5 FL (ref 80–100)
MONOCYTES # BLD AUTO: 0.7 K/UL (ref 0–1)
MONOCYTES NFR BLD AUTO: 3 %
NEUTROPHILS # BLD AUTO: 17.1 K/UL (ref 1.3–7.7)
NEUTROPHILS NFR BLD AUTO: 89 %
PLATELET # BLD AUTO: 205 K/UL (ref 150–450)
POTASSIUM SERPL-SCNC: 3.7 MMOL/L (ref 3.5–5.1)
PROT SERPL-MCNC: 6.8 G/DL (ref 6.3–8.2)
SODIUM SERPL-SCNC: 134 MMOL/L (ref 137–145)
WBC # BLD AUTO: 19.1 K/UL (ref 3.8–10.6)

## 2022-08-31 PROCEDURE — 80048 BASIC METABOLIC PNL TOTAL CA: CPT

## 2022-08-31 PROCEDURE — 80053 COMPREHEN METABOLIC PANEL: CPT

## 2022-08-31 PROCEDURE — 99285 EMERGENCY DEPT VISIT HI MDM: CPT

## 2022-08-31 PROCEDURE — 74018 RADEX ABDOMEN 1 VIEW: CPT

## 2022-08-31 PROCEDURE — 74177 CT ABD & PELVIS W/CONTRAST: CPT

## 2022-08-31 PROCEDURE — 88307 TISSUE EXAM BY PATHOLOGIST: CPT

## 2022-08-31 PROCEDURE — 85025 COMPLETE CBC W/AUTO DIFF WBC: CPT

## 2022-08-31 PROCEDURE — 93306 TTE W/DOPPLER COMPLETE: CPT

## 2022-08-31 PROCEDURE — 83735 ASSAY OF MAGNESIUM: CPT

## 2022-08-31 PROCEDURE — 86901 BLOOD TYPING SEROLOGIC RH(D): CPT

## 2022-08-31 PROCEDURE — 86850 RBC ANTIBODY SCREEN: CPT

## 2022-08-31 PROCEDURE — 86140 C-REACTIVE PROTEIN: CPT

## 2022-08-31 PROCEDURE — 84550 ASSAY OF BLOOD/URIC ACID: CPT

## 2022-08-31 PROCEDURE — 96375 TX/PRO/DX INJ NEW DRUG ADDON: CPT

## 2022-08-31 PROCEDURE — 83605 ASSAY OF LACTIC ACID: CPT

## 2022-08-31 PROCEDURE — 87040 BLOOD CULTURE FOR BACTERIA: CPT

## 2022-08-31 PROCEDURE — 93005 ELECTROCARDIOGRAM TRACING: CPT

## 2022-08-31 PROCEDURE — 85610 PROTHROMBIN TIME: CPT

## 2022-08-31 PROCEDURE — 36415 COLL VENOUS BLD VENIPUNCTURE: CPT

## 2022-08-31 PROCEDURE — 36573 INSJ PICC RS&I 5 YR+: CPT

## 2022-08-31 PROCEDURE — 84132 ASSAY OF SERUM POTASSIUM: CPT

## 2022-08-31 PROCEDURE — 64999 UNLISTED PX NERVOUS SYSTEM: CPT

## 2022-08-31 PROCEDURE — 86900 BLOOD TYPING SEROLOGIC ABO: CPT

## 2022-08-31 PROCEDURE — 84478 ASSAY OF TRIGLYCERIDES: CPT

## 2022-08-31 PROCEDURE — 94760 N-INVAS EAR/PLS OXIMETRY 1: CPT

## 2022-08-31 PROCEDURE — 96365 THER/PROPH/DIAG IV INF INIT: CPT

## 2022-08-31 PROCEDURE — 84100 ASSAY OF PHOSPHORUS: CPT

## 2022-08-31 PROCEDURE — 96376 TX/PRO/DX INJ SAME DRUG ADON: CPT

## 2022-08-31 PROCEDURE — 96361 HYDRATE IV INFUSION ADD-ON: CPT

## 2022-08-31 PROCEDURE — 82330 ASSAY OF CALCIUM: CPT

## 2022-08-31 RX ADMIN — CEFAZOLIN SCH MLS/HR: 330 INJECTION, POWDER, FOR SOLUTION INTRAMUSCULAR; INTRAVENOUS at 15:54

## 2022-08-31 RX ADMIN — METRONIDAZOLE SCH MLS/HR: 500 INJECTION, SOLUTION INTRAVENOUS at 20:02

## 2022-08-31 RX ADMIN — HYDROMORPHONE HYDROCHLORIDE PRN MG: 1 INJECTION, SOLUTION INTRAMUSCULAR; INTRAVENOUS; SUBCUTANEOUS at 18:04

## 2022-08-31 RX ADMIN — HYDROMORPHONE HYDROCHLORIDE PRN MG: 1 INJECTION, SOLUTION INTRAMUSCULAR; INTRAVENOUS; SUBCUTANEOUS at 21:50

## 2022-08-31 RX ADMIN — HEPARIN SODIUM SCH UNIT: 5000 INJECTION INTRAVENOUS; SUBCUTANEOUS at 20:00

## 2022-08-31 NOTE — P.HPIM
History of Present Illness


H&P Date: 08/31/22





Patient is a 41-year-old male with no significant past medical history presents 

the ED for abdominal pain.  He was recently seen on 08/30/2022 for similar 

complaints.  CT abdomen and pelvis at that time demonstrated acute uncomplicated

sigmoid colon diverticulitis and patient was discharged home on Augmentin.  He 

presented back to the ED on 08/31/2022 for worsening abdominal pain.  Patient 

reports abdominal pain ongoing for the past 3 days.  Pain is generalized all 

over his abdomen.  Pain is colicky in nature.  Pain is 10 out of 10 in severity.

 Pain does not radiate.  Pain is associated with nausea and vomiting along with 

chills.  He denies any headache, lower extremity edema, cough, chest pain, 

shortness of breath, palpitations, changes in urination or bowel habits.  

Patient reports a decreased appetite.  He denies any lightheadedness, 

numbness/weakness/tingling of the extremities.  In the ER, he was tachycardic 

with heart rate of 96 and T-max of 100.4 Fahrenheit.  Vital signs otherwise 

stable.  CBC showed leukocytosis of 19.1 with neutrophilia.  CMP showed sodium 

134 glucose 108, total bilirubin of 2.6, AST of 79, ALT of 118.  Patient is 

admitted for sepsis related to diverticulitis, failed outpatient therapy.





Review of systems is performed and is negative except above.





General: [non toxic], [no distress], [appears at stated age]


Derm: [warm], [dry]


Head: [atraumatic], [normocephalic], [symmetric]


Eyes: [EOMI], [no lid lag], [anicteric sclera]


Mouth: [no lip lesion], [mucus membranes moist]


Cardiovascular: [Tachycardic], [no murmur]


Lungs: [CTA bilateral], [no rhonchi, no rales] , [no accessory muscle use]


Abdominal: [soft], [tenderness to palpation in all 4 quadrants without rebound],

[no guarding], [no appreciable organomegaly]


Ext: [no gross muscle atrophy], [no edema], [no contractures]


Neuro: [no focal neuro deficits]


Psych: [Alert], [oriented], [appropriate affect] 





#Sepsis


#Acute diverticulitis


#Transaminitis


#Morbid obesity





Patient currently meets sepsis criteria with tachycardia, leukocytosis and a 

positive source of infection.  CT AP positive for acute diverticulitis.  Patient

was started on Zosyn and Flagyl.  Blood cultures are collected.  Lactic acid 

will be ordered.  Patient be placed on telemetry monitoring.  Normal saline at 

130 mL per hour.





Patient reports history of cholecystectomy.  Review of home medication shows 

that the patient is on testosterone supplementation.  Gallbladder ultrasound 

will be obtained.





Patient will benefit from a structured weight loss program.





DVT prophylaxis: [Heparin]


Discussed with: [Patient]


Anticipated discharge: [2-3 days]


Anticipated discharge place: [Home]


A total of [35] minutes was spent on the care of this complex patient more than 

50% of the time was spent in counseling and care coordination. 





Patient names his wife decision maker if he can't make decisions for himself.  

Patient would like to be full code.





Past Medical History


Past Medical History: No Reported History


History of Any Multi-Drug Resistant Organisms: None Reported


Past Surgical History: Cholecystectomy, Orthopedic Surgery


Additional Past Surgical History / Comment(s): sinus


Past Psychological History: No Psychological Hx Reported


Smoking Status: Never smoker


Past Alcohol Use History: None Reported


Past Drug Use History: None Reported





Medications and Allergies


                                Home Medications











 Medication  Instructions  Recorded  Confirmed  Type


 


Omeprazole 20 mg PO DAILY 06/14/21 08/31/22 History


 


Amoxic-Pot Clav 875-125Mg 1 tab PO Q12HR #20 tab 08/30/22 08/31/22 Rx





[Augmentin 875-125]    


 


Ondansetron Odt [Zofran Odt] 4 mg PO Q8HR PRN #10 tab 08/30/22 08/31/22 Rx


 


Testosterone Cypionate 200 mg IM Q14D 08/30/22 08/31/22 History





[Depo-Testosterone]    








                                    Allergies











Allergy/AdvReac Type Severity Reaction Status Date / Time


 


No Known Allergies Allergy   Verified 08/31/22 16:06














Physical Exam


Vitals: 


                                   Vital Signs











  Temp Pulse Resp BP Pulse Ox


 


 08/31/22 14:31  100.4 F H  96  18  127/79  96








                                Intake and Output











 08/31/22 08/31/22 08/31/22





 06:59 14:59 22:59


 


Other:   


 


  Weight  138.346 kg 














Results


CBC & Chem 7: 


                                 08/31/22 14:43





                                 08/31/22 14:43


Labs: 


                  Abnormal Lab Results - Last 24 Hours (Table)











  08/31/22 08/31/22 Range/Units





  14:43 14:43 


 


WBC  19.1 H   (3.8-10.6)  k/uL


 


Neutrophils #  17.1 H   (1.3-7.7)  k/uL


 


Lymphocytes #  0.9 L   (1.0-4.8)  k/uL


 


Sodium   134 L  (137-145)  mmol/L


 


Glucose   108 H  (74-99)  mg/dL


 


Total Bilirubin   2.6 H  (0.2-1.3)  mg/dL


 


AST   79 H  (17-59)  U/L


 


ALT   118 H  (4-49)  U/L

## 2022-08-31 NOTE — ED
Abdominal Pain HPI





- General


Chief Complaint: Abdominal Pain


Stated Complaint: abd pain


Time Seen by Provider: 08/31/22 14:30


Source: patient, EMS, RN notes reviewed


Mode of arrival: EMS


Limitations: no limitations





- History of Present Illness


Initial Comments: 


41-year-old male presents emergency department via EMS chief complaint abdominal

pain.  Patient states started about 3 days ago seen here yesterday had full 

workup including labs, CT patient found to have acute diverticulitis.  Patient 

has taken 3 doses of Augmentin states that the pain is intensified, unable 

tolerate pain, oral intake at home.  Patient had episodes of vomiting.  States 

his upper and lower abdominal pain.  She had some loose stools report hot and 

cold flashes no reported fever.  No chest pain or shortness of breath








- Related Data


                                Home Medications











 Medication  Instructions  Recorded  Confirmed


 


Omeprazole 20 mg PO DAILY 06/14/21 08/30/22


 


Testosterone Cypionate 200 mg IM Q14D 08/30/22 08/30/22





[Depo-Testosterone]   








                                  Previous Rx's











 Medication  Instructions  Recorded


 


Amoxic-Pot Clav 875-125Mg 1 tab PO Q12HR #20 tab 08/30/22





[Augmentin 875-125]  


 


Ondansetron Odt [Zofran Odt] 4 mg PO Q8HR PRN #10 tab 08/30/22











                                    Allergies











Allergy/AdvReac Type Severity Reaction Status Date / Time


 


No Known Allergies Allergy   Verified 08/31/22 14:33














Review of Systems


ROS Statement: 


Those systems with pertinent positive or pertinent negative responses have been 

documented in the HPI.





ROS Other: All systems not noted in ROS Statement are negative.





Past Medical History


Past Medical History: No Reported History


History of Any Multi-Drug Resistant Organisms: None Reported


Past Surgical History: Cholecystectomy, Orthopedic Surgery


Additional Past Surgical History / Comment(s): sinus


Past Psychological History: No Psychological Hx Reported


Smoking Status: Never smoker


Past Alcohol Use History: None Reported


Past Drug Use History: None Reported





General Exam


Limitations: no limitations


General appearance: alert, in no apparent distress


Head exam: Present: atraumatic, normocephalic, normal inspection


Eye exam: Present: normal appearance, PERRL, EOMI.  Absent: scleral icterus, c

onjunctival injection, periorbital swelling


ENT exam: Present: normal exam, normal oropharynx, mucous membranes moist


Neck exam: Present: normal inspection, full ROM.  Absent: tenderness, 

meningismus, lymphadenopathy


Respiratory exam: Present: normal lung sounds bilaterally.  Absent: respiratory 

distress, wheezes, rales, rhonchi, stridor


Cardiovascular Exam: Present: regular rate, normal rhythm, normal heart sounds. 

Absent: systolic murmur, diastolic murmur, rubs, gallop, clicks


GI/Abdominal exam: Present: soft, tenderness, normal bowel sounds.  Absent: 

distended, guarding, rebound, rigid


Back exam: Absent: CVA tenderness (R), CVA tenderness (L)


Skin exam: Present: warm, dry, intact, normal color.  Absent: rash





Course


                                   Vital Signs











  08/31/22





  14:31


 


Temperature 100.4 F H


 


Pulse Rate 96


 


Respiratory 18





Rate 


 


Blood Pressure 127/79


 


O2 Sat by Pulse 96





Oximetry 














Medical Decision Making





- Medical Decision Making


41-year-old male presented for recheck of abdominal pain, diverticulitis.  

Patient had prior CT which showed diverticulitis without perforation or abscess.

 Patient's white count is elevated 18,000, increasing pain on control pain at 

home having multiple episodes of vomiting will be admitted for IV antibiotics, 

pain control.








- Lab Data


Result diagrams: 


                                 08/31/22 14:43





                                 08/31/22 14:43


                                   Lab Results











  08/31/22 08/31/22 08/31/22 Range/Units





  14:43 14:43 14:43 


 


WBC  19.1 H    (3.8-10.6)  k/uL


 


RBC  4.84    (4.30-5.90)  m/uL


 


Hgb  14.6    (13.0-17.5)  gm/dL


 


Hct  45.7    (39.0-53.0)  %


 


MCV  94.5    (80.0-100.0)  fL


 


MCH  30.2    (25.0-35.0)  pg


 


MCHC  31.9    (31.0-37.0)  g/dL


 


RDW  12.8    (11.5-15.5)  %


 


Plt Count  205    (150-450)  k/uL


 


MPV  8.2    


 


Neutrophils %  89    %


 


Lymphocytes %  5    %


 


Monocytes %  3    %


 


Eosinophils %  1    %


 


Basophils %  0    %


 


Neutrophils #  17.1 H    (1.3-7.7)  k/uL


 


Lymphocytes #  0.9 L    (1.0-4.8)  k/uL


 


Monocytes #  0.7    (0-1.0)  k/uL


 


Eosinophils #  0.3    (0-0.7)  k/uL


 


Basophils #  0.1    (0-0.2)  k/uL


 


Sodium   134 L   (137-145)  mmol/L


 


Potassium   3.7   (3.5-5.1)  mmol/L


 


Chloride   99   ()  mmol/L


 


Carbon Dioxide   22   (22-30)  mmol/L


 


Anion Gap   13   mmol/L


 


BUN   14   (9-20)  mg/dL


 


Creatinine   0.85   (0.66-1.25)  mg/dL


 


Est GFR (CKD-EPI)AfAm   >90   (>60 ml/min/1.73 sqM)  


 


Est GFR (CKD-EPI)NonAf   >90   (>60 ml/min/1.73 sqM)  


 


Glucose   108 H   (74-99)  mg/dL


 


Plasma Lactic Acid Rbandan    1.1  (0.7-2.0)  mmol/L


 


Calcium   8.5   (8.4-10.2)  mg/dL


 


Total Bilirubin   2.6 H   (0.2-1.3)  mg/dL


 


AST   79 H   (17-59)  U/L


 


ALT   118 H   (4-49)  U/L


 


Alkaline Phosphatase   85   ()  U/L


 


Total Protein   6.8   (6.3-8.2)  g/dL


 


Albumin   4.0   (3.5-5.0)  g/dL














Disposition


Clinical Impression: 


 Acute diverticulitis, Failure of outpatient treatment, Abdominal pain





Disposition: ADMITTED AS IP TO THIS HOSP


Condition: Fair


Referrals: 


Kale Bauman MD [Primary Care Provider] - 1-2 days


Time of Disposition: 15:42

## 2022-08-31 NOTE — XR
EXAMINATION TYPE: XR KUB

 

DATE OF EXAM: 8/31/2022

 

COMPARISON: 3/15/2015

 

INDICATION: Abdomen pain nausea vomiting

 

TECHNIQUE: Single view abdomen upright view

 

FINDINGS:  

There are air-fluid levels within small bowel loops within the midabdomen. Some air-fluid levels or w
ithin the ascending colon region. Correlate for gastroenteritis. Consider ileus. Follow-up can be per
formed as clinically indicated.

Psoas margins are normal.

No organomegaly is present.

 

 

IMPRESSION: 

1. Small bowel air-fluid levels within the midabdomen with air also present within the colon and some
 air fluid levels in the ascending colon region. Gastroenteritis and ileus are favored within the dif
ferential. Mild partial small bowel obstruction should be considered. Follow up exams can be performe
d as clinically indicated

## 2022-09-01 LAB
ALBUMIN SERPL-MCNC: 3.1 G/DL (ref 3.5–5)
ALBUMIN/GLOB SERPL: 1.3 {RATIO}
ALP SERPL-CCNC: 61 U/L (ref 38–126)
ALT SERPL-CCNC: 81 U/L (ref 4–49)
ANION GAP SERPL CALC-SCNC: 11 MMOL/L
AST SERPL-CCNC: 44 U/L (ref 17–59)
BASOPHILS # BLD AUTO: 0 K/UL (ref 0–0.2)
BASOPHILS NFR BLD AUTO: 0 %
BUN SERPL-SCNC: 14 MG/DL (ref 9–20)
CALCIUM SPEC-MCNC: 7.7 MG/DL (ref 8.4–10.2)
CHLORIDE SERPL-SCNC: 101 MMOL/L (ref 98–107)
CO2 SERPL-SCNC: 24 MMOL/L (ref 22–30)
EOSINOPHIL # BLD AUTO: 0 K/UL (ref 0–0.7)
EOSINOPHIL NFR BLD AUTO: 0 %
ERYTHROCYTE [DISTWIDTH] IN BLOOD BY AUTOMATED COUNT: 4.4 M/UL (ref 4.3–5.9)
ERYTHROCYTE [DISTWIDTH] IN BLOOD: 12.8 % (ref 11.5–15.5)
GLOBULIN SER CALC-MCNC: 2.4 G/DL
GLUCOSE SERPL-MCNC: 100 MG/DL (ref 74–99)
HCT VFR BLD AUTO: 41.9 % (ref 39–53)
HGB BLD-MCNC: 13.6 GM/DL (ref 13–17.5)
INR PPP: 1.2 (ref ?–1.2)
LYMPHOCYTES # SPEC AUTO: 0.7 K/UL (ref 1–4.8)
LYMPHOCYTES NFR SPEC AUTO: 4 %
MCH RBC QN AUTO: 31 PG (ref 25–35)
MCHC RBC AUTO-ENTMCNC: 32.5 G/DL (ref 31–37)
MCV RBC AUTO: 95.2 FL (ref 80–100)
MONOCYTES # BLD AUTO: 0.6 K/UL (ref 0–1)
MONOCYTES NFR BLD AUTO: 3 %
NEUTROPHILS # BLD AUTO: 14.9 K/UL (ref 1.3–7.7)
NEUTROPHILS NFR BLD AUTO: 91 %
PLATELET # BLD AUTO: 208 K/UL (ref 150–450)
POTASSIUM SERPL-SCNC: 3.6 MMOL/L (ref 3.5–5.1)
PROT SERPL-MCNC: 5.5 G/DL (ref 6.3–8.2)
PT BLD: 12.3 SEC (ref 9–12)
SODIUM SERPL-SCNC: 136 MMOL/L (ref 137–145)
WBC # BLD AUTO: 16.4 K/UL (ref 3.8–10.6)

## 2022-09-01 RX ADMIN — HYDROMORPHONE HYDROCHLORIDE PRN MG: 1 INJECTION, SOLUTION INTRAMUSCULAR; INTRAVENOUS; SUBCUTANEOUS at 04:35

## 2022-09-01 RX ADMIN — CEFAZOLIN SCH MLS/HR: 330 INJECTION, POWDER, FOR SOLUTION INTRAMUSCULAR; INTRAVENOUS at 16:42

## 2022-09-01 RX ADMIN — HYDROMORPHONE HYDROCHLORIDE PRN MG: 1 INJECTION, SOLUTION INTRAMUSCULAR; INTRAVENOUS; SUBCUTANEOUS at 20:45

## 2022-09-01 RX ADMIN — SIMETHICONE SCH MG: 20 SUSPENSION/ DROPS ORAL at 22:45

## 2022-09-01 RX ADMIN — ACETAMINOPHEN SCH MLS/HR: 10 INJECTION, SOLUTION INTRAVENOUS at 12:41

## 2022-09-01 RX ADMIN — HEPARIN SODIUM SCH: 5000 INJECTION INTRAVENOUS; SUBCUTANEOUS at 08:15

## 2022-09-01 RX ADMIN — CEFAZOLIN SCH MLS/HR: 330 INJECTION, POWDER, FOR SOLUTION INTRAMUSCULAR; INTRAVENOUS at 22:45

## 2022-09-01 RX ADMIN — METRONIDAZOLE SCH MLS/HR: 500 INJECTION, SOLUTION INTRAVENOUS at 11:26

## 2022-09-01 RX ADMIN — ACETAMINOPHEN SCH MLS/HR: 10 INJECTION, SOLUTION INTRAVENOUS at 17:50

## 2022-09-01 RX ADMIN — ACETAMINOPHEN SCH: 10 INJECTION, SOLUTION INTRAVENOUS at 08:05

## 2022-09-01 RX ADMIN — METRONIDAZOLE SCH MLS/HR: 500 INJECTION, SOLUTION INTRAVENOUS at 16:42

## 2022-09-01 RX ADMIN — ACETAMINOPHEN SCH MLS/HR: 10 INJECTION, SOLUTION INTRAVENOUS at 03:44

## 2022-09-01 RX ADMIN — HYDROMORPHONE HYDROCHLORIDE PRN MG: 1 INJECTION, SOLUTION INTRAMUSCULAR; INTRAVENOUS; SUBCUTANEOUS at 17:29

## 2022-09-01 RX ADMIN — CEFAZOLIN SCH: 330 INJECTION, POWDER, FOR SOLUTION INTRAMUSCULAR; INTRAVENOUS at 09:43

## 2022-09-01 RX ADMIN — HYDROMORPHONE HYDROCHLORIDE PRN MG: 1 INJECTION, SOLUTION INTRAMUSCULAR; INTRAVENOUS; SUBCUTANEOUS at 23:36

## 2022-09-01 RX ADMIN — HEPARIN SODIUM SCH: 5000 INJECTION INTRAVENOUS; SUBCUTANEOUS at 19:17

## 2022-09-01 RX ADMIN — KETOROLAC TROMETHAMINE SCH MG: 15 INJECTION, SOLUTION INTRAMUSCULAR; INTRAVENOUS at 02:47

## 2022-09-01 RX ADMIN — METRONIDAZOLE SCH MLS/HR: 500 INJECTION, SOLUTION INTRAVENOUS at 04:35

## 2022-09-01 RX ADMIN — ONDANSETRON PRN MG: 2 INJECTION INTRAMUSCULAR; INTRAVENOUS at 17:49

## 2022-09-01 RX ADMIN — PIPERACILLIN AND TAZOBACTAM SCH MLS/HR: 3; .375 INJECTION, POWDER, FOR SOLUTION INTRAVENOUS at 19:54

## 2022-09-01 RX ADMIN — ACETAMINOPHEN SCH MLS/HR: 10 INJECTION, SOLUTION INTRAVENOUS at 22:45

## 2022-09-01 RX ADMIN — KETOROLAC TROMETHAMINE SCH MG: 15 INJECTION, SOLUTION INTRAMUSCULAR; INTRAVENOUS at 06:01

## 2022-09-01 RX ADMIN — KETOROLAC TROMETHAMINE SCH MG: 15 INJECTION, SOLUTION INTRAMUSCULAR; INTRAVENOUS at 23:37

## 2022-09-01 RX ADMIN — PIPERACILLIN AND TAZOBACTAM SCH MLS/HR: 3; .375 INJECTION, POWDER, FOR SOLUTION INTRAVENOUS at 08:14

## 2022-09-01 RX ADMIN — PIPERACILLIN AND TAZOBACTAM SCH: 3; .375 INJECTION, POWDER, FOR SOLUTION INTRAVENOUS at 02:40

## 2022-09-01 RX ADMIN — HYDROMORPHONE HYDROCHLORIDE PRN MG: 1 INJECTION, SOLUTION INTRAMUSCULAR; INTRAVENOUS; SUBCUTANEOUS at 09:23

## 2022-09-01 RX ADMIN — CEFAZOLIN SCH MLS/HR: 330 INJECTION, POWDER, FOR SOLUTION INTRAMUSCULAR; INTRAVENOUS at 00:02

## 2022-09-01 RX ADMIN — PANTOPRAZOLE SODIUM SCH MG: 40 INJECTION, POWDER, FOR SOLUTION INTRAVENOUS at 08:15

## 2022-09-01 RX ADMIN — HYDROMORPHONE HYDROCHLORIDE PRN MG: 1 INJECTION, SOLUTION INTRAMUSCULAR; INTRAVENOUS; SUBCUTANEOUS at 02:47

## 2022-09-01 RX ADMIN — PIPERACILLIN AND TAZOBACTAM SCH MLS/HR: 3; .375 INJECTION, POWDER, FOR SOLUTION INTRAVENOUS at 14:08

## 2022-09-01 RX ADMIN — KETOROLAC TROMETHAMINE SCH MG: 15 INJECTION, SOLUTION INTRAMUSCULAR; INTRAVENOUS at 12:42

## 2022-09-01 RX ADMIN — ONDANSETRON PRN MG: 2 INJECTION INTRAMUSCULAR; INTRAVENOUS at 23:36

## 2022-09-01 RX ADMIN — METRONIDAZOLE SCH MLS/HR: 500 INJECTION, SOLUTION INTRAVENOUS at 21:46

## 2022-09-01 RX ADMIN — KETOROLAC TROMETHAMINE SCH MG: 15 INJECTION, SOLUTION INTRAMUSCULAR; INTRAVENOUS at 17:27

## 2022-09-01 NOTE — P.CRDCN
History of Present Illness


History of present illness: 





HISTORY OF PRESENTING ILLNESS


Patient is a pleasant 41-year-old male with history of diverticulitis, abnormal 

EKG, and no other significant disease who presents secondary to worsening abdomi

nal pain over the last 3 days.  He states he has had occasional episodes of 

abdominal pain however this was much worse and having low-grade fevers and 

feeling nauseous.  Patient presented and was found to have perforated 

diverticulitis with pneumoperitoneum.  He was found to have leukocytosis with f

lora and sepsis and was placed on antibiotics.  He is nothing by mouth.  

Cardiology was asked to evaluate for preoperative evaluation.  He states he has 

had abnormal EKGs in the past in 2019 EKG did show normal sinus rhythm, 

incomplete right bundle branch block, nonspecific 0.5 mm ST depressions V3 

through V6.  His EKG this admission appears similar however tachycardic.  He 

denies any actual chest pain or pressure.  He does have mild dyspnea on exertion

however is easily able to walk up a flight of stairs without any difficulty.  

Echocardiogram performed this morning showed EF 55-60%, mild LVH, trace mitral 

regurgitation and aortic root measuring 3.8 cm.





REVIEW OF SYSTEMS


At the time of my exam:


CONSTITUTIONAL: Denies fever or chills.


CARDIOVASCULAR: Denies chest pain, +mild shortness of breath, no orthopnea, PND 

or palpitations.


RESPIRATORY: Denies cough. 


GASTROINTESTINAL: Denies abdominal pain, diarrhea, constipation, nausea or 

vomiting.


MUSCULOSKELETAL: Denies myalgias.


NEUROLOGIC: Denies numbness, tingling or weakness.


ENDOCRINE: Denies fatigue, weight change,  polydipsia or polyurina.


GENITOURINARY: Denies burning, hematuria or urgency with micturation.


HEMATOLOGIC: Denies history of anemia or bleeding. 





PHYSICAL EXAMINATION


Vital signs reviewed.


CONSTITUTIONAL: No apparent distress, mildly ill appearing


HEENT: Head is normocephalic. Pupils are equal, round. Sclerae anicteric. Mucous

membranes of the mouth are moist.  No JVD. No carotid bruit.


CHEST EXAMINATION: Lungs are clear to auscultation. No chest wall tenderness is 

noted on palpation or with deep breathing. 


HEART EXAMINATION: Regular rate and rhythm. S1, S2 heard. No murmurs, gallops or

rub.


ABDOMEN: Soft, nontender. Positive bowel sounds.


EXTREMITIES: 2+ peripheral pulses, no lower extremity edema and no calf 

tenderness.


NEUROLOGIC EXAMINATION: Patient is awake, alert and oriented x3. 





ASSESSMENT


1.  Preoperative cardiovascular exam


2.  Acute diverticulitis with pneumoperitoneum


3.  Sepsis


4.  Abnormal EKG


5.  Mild aortic root dilation 3.8 cm


6.  Mild LVH





PLAN


Patient does have abnormal EKG however appears similar to previous.  Sinus 

tachycardia reactive secondary to sepsis and pain.  Echocardiogram reviewed and 

without any significant valvular disease and with preserved EF.  Patient not ha

ving any unstable angina-type symptoms or heart failure type symptoms and 

patient is low risk for proposed exploratory laparotomy.











Past Medical History


Past Medical History: No Reported History


History of Any Multi-Drug Resistant Organisms: None Reported


Past Surgical History: Cholecystectomy, Orthopedic Surgery


Additional Past Surgical History / Comment(s): sinus


Past Anesthesia/Blood Transfusion Reactions: No Reported Reaction


Additional Past Anesthesia/Blood Transfusion Reaction / Comment(s): Nauea


Past Psychological History: No Psychological Hx Reported


Smoking Status: Never smoker


Past Alcohol Use History: None Reported


Past Drug Use History: None Reported





Medications and Allergies


                                Home Medications











 Medication  Instructions  Recorded  Confirmed  Type


 


Omeprazole 20 mg PO DAILY 06/14/21 08/31/22 History


 


Amoxic-Pot Clav 875-125Mg 1 tab PO Q12HR #20 tab 08/30/22 08/31/22 Rx





[Augmentin 875-125]    


 


Ondansetron Odt [Zofran Odt] 4 mg PO Q8HR PRN #10 tab 08/30/22 08/31/22 Rx


 


Testosterone Cypionate 200 mg IM Q14D 08/30/22 08/31/22 History





[Depo-Testosterone]    








                                    Allergies











Allergy/AdvReac Type Severity Reaction Status Date / Time


 


No Known Allergies Allergy   Verified 08/31/22 16:06














Physical Exam


Vitals: 


                                   Vital Signs











  Temp Pulse Pulse Resp BP BP Pulse Ox


 


 09/01/22 11:22  97.5 F L   104 H  16   132/91  95


 


 09/01/22 08:00    111 H    


 


 09/01/22 05:56    111 H    


 


 09/01/22 05:24    111 H    


 


 09/01/22 03:55  98.9 F   124 H  18   123/78  92 L


 


 09/01/22 02:30  99.4 F   128 H  18   113/75  94 L


 


 09/01/22 00:19  98.3 F   128 H  18   109/77  95


 


 08/31/22 20:00    97  20   


 


 08/31/22 19:34  98.6 F   97  20   149/93  95


 


 08/31/22 18:30  100.4 F H  70   18  120/68   98


 


 08/31/22 14:31  100.4 F H  96   18  127/79   96








                                Intake and Output











 08/31/22 09/01/22 09/01/22





 22:59 06:59 14:59


 


Intake Total  3000 


 


Balance  3000 


 


Intake:   


 


  Intake, IV Titration  2700 





  Amount   


 


    ACETAMINOPHEN IV (For NPO  100 





    ) 1,000 mg In Empty Bag 1   





    bag @ 400 mls/hr IVPB   





    Q6HR [facility] Rx#:[id number]   


 


    Piperacillin-Tazobactam 3  100 





    .375 gm In Sodium   





    Chloride 0.9% 100 ml @ 25   





    mls/hr IVPB Q6H [facility] Rx#:   





    [id number]   


 


    Sodium Chloride 0.9% 1,  1300 





    000 ml @ 130 mls/hr IV .   





    Q7H42M [facility] Rx#:[id number]   


 


    Sodium Chloride 0.9% 1,  1000 





    000 ml @ 999 mls/hr IV .   





    Q1H1M [facility] Rx#:[id number]   


 


    metroNIDAZOLE-NS   200 





    mg In Saline 1 100ml.bag   





    @ 100 mls/hr IVPB Q8H [facility]   





    Rx#:[id number]   


 


  Oral  300 


 


Other:   


 


  Voiding Method Toilet  


 


  # Voids  6 


 


  # Bowel Movements  2 


 


  Weight 138.346 kg  138.346 kg














Results





                                 09/01/22 06:21





                                 09/01/22 06:21


                                 Cardiac Enzymes











  08/31/22 09/01/22 Range/Units





  14:43 06:21 


 


AST  79 H  44  (17-59)  U/L








                                   Coagulation











  09/01/22 Range/Units





  09:00 


 


PT  12.3 H  (9.0-12.0)  sec








                                       CBC











  08/31/22 09/01/22 Range/Units





  14:43 06:21 


 


WBC  19.1 H  16.4 H  (3.8-10.6)  k/uL


 


RBC  4.84  4.40  (4.30-5.90)  m/uL


 


Hgb  14.6  13.6  (13.0-17.5)  gm/dL


 


Hct  45.7  41.9  (39.0-53.0)  %


 


Plt Count  205  208  (150-450)  k/uL








                          Comprehensive Metabolic Panel











  08/31/22 09/01/22 Range/Units





  14:43 06:21 


 


Sodium  134 L  136 L  (137-145)  mmol/L


 


Potassium  3.7  3.6  (3.5-5.1)  mmol/L


 


Chloride  99  101  ()  mmol/L


 


Carbon Dioxide  22  24  (22-30)  mmol/L


 


BUN  14  14  (9-20)  mg/dL


 


Creatinine  0.85  0.95  (0.66-1.25)  mg/dL


 


Glucose  108 H  100 H  (74-99)  mg/dL


 


Calcium  8.5  7.7 L  (8.4-10.2)  mg/dL


 


AST  79 H  44  (17-59)  U/L


 


ALT  118 H  81 H  (4-49)  U/L


 


Alkaline Phosphatase  85  61  ()  U/L


 


Total Protein  6.8  5.5 L  (6.3-8.2)  g/dL


 


Albumin  4.0  3.1 L  (3.5-5.0)  g/dL








                               Current Medications











Generic Name Dose Route Start Last Admin





  Trade Name Freq  PRN Reason Stop Dose Admin


 


Heparin Sodium (Porcine)  5,000 unit  08/31/22 21:00  09/01/22 08:15





  Heparin Sodium,Porcine/Pf 5,000 Unit/0.5 Ml Syringe  SQ   Not Given





  Q12HR [facility]  


 


Hydromorphone HCl  1 mg  09/01/22 02:37  09/01/22 09:23





  Hydromorphone 1 Mg/Ml 1 Ml Syringe  IVP   1 mg





  Q1H PRN   Administration





  Moderate Pain (Scale 4 to 6)  


 


Hydromorphone HCl  2 mg  09/01/22 02:37 





  Hydromorphone 1 Mg/Ml 1 Ml Syringe  IVP  





  Q4HR PRN  





  Severe Pain (Scale 7 to 10)  


 


Sodium Chloride  1,000 mls @ 130 mls/hr  08/31/22 15:45  09/01/22 09:43





  Saline 0.9%  IV   Not Given





  .Q7H42M [facility]  


 


Piperacillin Sod/Tazobactam  100 mls @ 25 mls/hr  09/01/22 02:30  09/01/22 08:14





  Sod 3.375 gm/ Sodium Chloride  IVPB   25 mls/hr





  Q6H [facility]   Administration





  Protocol  


 


Acetaminophen 1,000 mg/ IV  100 mls @ 400 mls/hr  09/01/22 03:00  09/01/22 12:41





  Solution  IVPB  09/01/22 18:14  400 mls/hr





  Q6HR [facility]   Administration


 


Metronidazole 500 mg/ IV  100 mls @ 100 mls/hr  09/01/22 10:00  09/01/22 11:26





  Solution  IVPB   100 mls/hr





  Q6H [facility]   Administration





  Protocol  


 


Ketorolac Tromethamine  15 mg  09/01/22 02:45  09/01/22 12:42





  Ketorolac 15 Mg/Ml 1 Ml Vial  IVP  09/04/22 02:37  15 mg





  Q6HR [facility]   Administration


 


Naloxone HCl  0.2 mg  08/31/22 15:42 





  Naloxone 0.4 Mg/Ml 1 Ml Vial  IV  





  Q2M PRN  





  Opioid Reversal  


 


Ondansetron HCl  4 mg  09/01/22 03:13 





  Ondansetron 4 Mg/2 Ml Vial  IVP  





  Q6HR PRN  





  Nausea And Vomiting  


 


Pantoprazole Sodium  40 mg  09/01/22 09:00  09/01/22 08:15





  Pantoprazole 40 Mg/10 Ml Vial  IV   40 mg





  DAILY [facility]   Administration


 


Simethicone  40 mg  09/01/22 01:00  09/01/22 01:46





  Simethicone 80 Mg Chewable  PO   40 mg





  QID PRN   Administration





  Dyspepsia  








                                Intake and Output











 08/31/22 09/01/22 09/01/22





 22:59 06:59 14:59


 


Intake Total  3000 


 


Balance  3000 


 


Intake:   


 


  Intake, IV Titration  2700 





  Amount   


 


    ACETAMINOPHEN IV (For NPO  100 





    ) 1,000 mg In Empty Bag 1   





    bag @ 400 mls/hr IVPB   





    Q6HR [facility] Rx#:[id number]   


 


    Piperacillin-Tazobactam 3  100 





    .375 gm In Sodium   





    Chloride 0.9% 100 ml @ 25   





    mls/hr IVPB Q6H [facility] Rx#:   





    [id number]   


 


    Sodium Chloride 0.9% 1,  1300 





    000 ml @ 130 mls/hr IV .   





    Q7H42M [facility] Rx#:[id number]   


 


    Sodium Chloride 0.9% 1,  1000 





    000 ml @ 999 mls/hr IV .   





    Q1H1M [facility] Rx#:[id number]   


 


    metroNIDAZOLE-NS   200 





    mg In Saline 1 100ml.bag   





    @ 100 mls/hr IVPB Q8H [facility]   





    Rx#:[id number]   


 


  Oral  300 


 


Other:   


 


  Voiding Method Toilet  


 


  # Voids  6 


 


  # Bowel Movements  2 


 


  Weight 138.346 kg  138.346 kg








                                 Patient Weight











 09/02/22





 06:59


 


Weight 138.346 kg








                                        





                                 09/01/22 06:21 





                                 09/01/22 06:21

## 2022-09-01 NOTE — P.PN
Subjective


Progress Note Date: 09/01/22











CHIEF COMPLAINT: Perforated diverticulitis





HISTORY OF PRESENT ILLNESS: The patient is a 41 year old male who presented to 

the hospital acute onset lower abdominal pain after recent visit in the 

emergency room.  This morning, patient was assessed for abnormal computed 

tomography scan for pneumoperitoneum.  Patient was adjusted on his antibiotics 

including pain medication.  This morning, patient reports feeling much better.  

He is passing flatus and had a bowel movement, no blood.  He reports resolution 

of his abdominal distention.  Denies any diffuse abdominal pain.  His pain is 

now minimal.  He just completed a bedside Echo.  He reports pre-existing history

of abnormal EKGs, without any further workup.





REVIEW OF ORGAN SYSTEMS:


CONSTITUTIONAL: Has fevers or chills. Has morbid obesity, BMI 42.5


GASTROINTESTINAL: Denies fatty food intolerance.  Recent constipation.


MUSCULOSKELETAL:  Has back pain, stiffness or joint arthritis. 





PHYSICAL EXAM:


VITALS: Reviewed


CONSTITUTIONAL:  Well developed and in no acute distress. 


EYES:  Conjuctivae without sclera icterus.  Extraocular movements grossly 

intact. 


HEAD, EARS, NOSE, THROAT: Moist buccal mucosa. Head is atraumatic, 

normocephalic. Hears conversational speech. No nasal drainage. 


NECK:  Supple. No JV distention. No thyroidomegaly.


RESPIRATORY:  Non-labored respirations and equal bilateral excursions. 


CARDIOVASCULAR:   Palpable 2+ radial pulses.


ABDOMEN:  Obese, protuberant. Decrease abdominal distention.


MUSCULOSKELETAL:  No clubbing cyanosis or edema.


SKIN:  Warm and well perfused with good skin turgor.


NEUROLOGIC: Cranial nerves II through XII grossly intact.  No focal or 

lateralizing signs. 


PSYCH:  Appropriate affect.  Alert and oriented to person, place and time. 

Displays appropriate insight.





CLINCAL LABS: Reviewed.  WBC elevated at 19.1 now 16.1. 





ASSESSMENT: 


1.  Perforated sigmoid diverticulitis with pneumoperitoneum


2.  Morbid obesity due to excess calories, BMI 42.5


3.  Leukocytosis with fevers, sepsis


4.  Elevated liver enzymes





PLAN: 


1.  Patient was on antibiotics and failed antibiotic management.  Infectious 

disease consultation obtained.


2.  Patient has pre-existing history of abnormal EKG without previous cardiac 

workup.  Cardiology consultation obtained.


3.  Continue ice chips and popsicles for 24 hours


4.  PICC line ordered due to failed outpatient antibiotics


5.  Consultation to dietitian for diverticulitis diet


6.  Disposition pending resolution of leukocytosis, tolerating liquid diet, anti

cipated for 48-72 hours.


7.  May start liquid diet in tomorrow morning pending continued clinical im

provement.


8.  Shared decision making performed and patient agreeable with care plan. All 

questions addressed.


9.  Risk for emergent colectomy also reviewed with clinical decline.


10.  Maintain oxygen sats over 92%








CRITICAL CARE TIME: Cumulative critical care time over 46 minutes.





Objective





- Vital Signs


Vital signs: 


                                   Vital Signs











Temp  98.9 F   09/01/22 03:55


 


Pulse  111 H  09/01/22 05:56


 


Resp  18   09/01/22 03:55


 


BP  123/78   09/01/22 03:55


 


Pulse Ox  92 L  09/01/22 03:55


 


FiO2      








                                 Intake & Output











 08/31/22 09/01/22 09/01/22





 18:59 06:59 18:59


 


Intake Total  3000 


 


Balance  3000 


 


Weight 138.346 kg 138.346 kg 


 


Intake:   


 


  Intake, IV Titration  2700 





  Amount   


 


    ACETAMINOPHEN IV (For NPO  100 





    ) 1,000 mg In Empty Bag 1   





    bag @ 400 mls/hr IVPB   





    Q6HR [facility] Rx#:[id number]   


 


    Piperacillin-Tazobactam 3  100 





    .375 gm In Sodium   





    Chloride 0.9% 100 ml @ 25   





    mls/hr IVPB Q6H [facility] Rx#:   





    [id number]   


 


    Sodium Chloride 0.9% 1,  1300 





    000 ml @ 130 mls/hr IV .   





    Q7H42M [facility] Rx#:[id number]   


 


    Sodium Chloride 0.9% 1,  1000 





    000 ml @ 999 mls/hr IV .   





    Q1H1M [facility] Rx#:[id number]   


 


    metroNIDAZOLE-NS   200 





    mg In Saline 1 100ml.bag   





    @ 100 mls/hr IVPB Q8H [facility]   





    Rx#:[id number]   


 


  Oral  300 


 


Other:   


 


  Voiding Method  Toilet 


 


  # Voids  6 


 


  # Bowel Movements  2 














- Labs


CBC & Chem 7: 


                                 09/01/22 06:21





                                 09/01/22 06:21


Labs: 


                  Abnormal Lab Results - Last 24 Hours (Table)











  08/31/22 08/31/22 09/01/22 Range/Units





  14:43 14:43 06:21 


 


WBC  19.1 H   16.4 H  (3.8-10.6)  k/uL


 


Neutrophils #  17.1 H   14.9 H  (1.3-7.7)  k/uL


 


Lymphocytes #  0.9 L   0.7 L  (1.0-4.8)  k/uL


 


Sodium   134 L   (137-145)  mmol/L


 


Glucose   108 H   (74-99)  mg/dL


 


Calcium     (8.4-10.2)  mg/dL


 


Total Bilirubin   2.6 H   (0.2-1.3)  mg/dL


 


AST   79 H   (17-59)  U/L


 


ALT   118 H   (4-49)  U/L


 


Total Protein     (6.3-8.2)  g/dL


 


Albumin     (3.5-5.0)  g/dL














  09/01/22 Range/Units





  06:21 


 


WBC   (3.8-10.6)  k/uL


 


Neutrophils #   (1.3-7.7)  k/uL


 


Lymphocytes #   (1.0-4.8)  k/uL


 


Sodium  136 L  (137-145)  mmol/L


 


Glucose  100 H  (74-99)  mg/dL


 


Calcium  7.7 L  (8.4-10.2)  mg/dL


 


Total Bilirubin  1.8 H  (0.2-1.3)  mg/dL


 


AST   (17-59)  U/L


 


ALT  81 H  (4-49)  U/L


 


Total Protein  5.5 L  (6.3-8.2)  g/dL


 


Albumin  3.1 L  (3.5-5.0)  g/dL

## 2022-09-01 NOTE — P.EN
Notified by the patient's RN that patient endorsed worsening abdominal pain.  

The patient was subsequently seen at the bedside and reported that the as needed

Dilaudid had not alleviated his pain.  Reported that he was 10 out of 10 at the 

time of interview and that his pain had appeared to be worsening.  The patient 

was also noted to be tachycardic with pulse 128 and /77.  4 mg of IV push 

morphine was administered and a CT abdomen and pelvis with contrast was ordered.

 Subsequently discussed the case with the findings of the computed tomography 

scan with radiologist on-call Dr. Daly who noted that the patient appears to

have significant pneumoperitoneum without any obvious drainable collection or 

abscess.  He suspected perforated diverticuli as a source.





General: Non-toxic, in moderate distress, appears stated age, obese


HEENT: NC/AT, anicteric sclerae, moist conjunctiva, no lid-lag, PERRLA


Cardiovascular: S1/S2 wnl, no murmurs, rubs, or gallops


Lungs: Clear to auscultation, normal respiratory effort, no accessory muscle use




Abdominal: Distended, mild tenderness, no guarding or rebound


Skin: Warm, dry


Extremities: No edema or contractures


Psychiatric: Alert and oriented to person, place and time, appropriate affect


Neuro: CN II-XII grossly intact, Strength 5/5 in all 4 extremities, Speech 

intact, Sensation to light touch grossly intact throughout





Assessment/plan





Perforated diverticulitis with pneumoperitoneum


-Continue with broad-spectrum antibiotics IV Zosyn and Flagyl


-Stat surgery consult placed, awaiting callback


-Continue IV fluids

## 2022-09-01 NOTE — CT
EXAMINATION TYPE: CT abdomen pelvis w con

 

DATE OF EXAM: 9/1/2022

 

COMPARISON: 9/1/2022

 

HISTORY: Abd pain

 

CT DLP: 2362.10 mGycm

Automated exposure control for dose reduction was used.

 

CONTRAST: 

Performed with IV Contrast, patient injected with 100 mL of Isovue 300.

 

Images obtained from the diaphragm to the floor the pelvis with the IV contrast.

 

There is mild subsegmental atelectasis left lung base. Heart size is normal. No pericardial effusion.
 No pleural effusion.

 

There is mild pneumoperitoneum. There are multiple air bubbles in the anterior abdomen in the periton
eal space. The liver and spleen are intact. The stomach is intact. No pancreatic mass. There are clip
s from cholecystectomy. The bile ducts are not dilated.

 

There is no adrenal mass. Kidneys have normal size and contour. No hydronephrosis. There is 2 cm manoj
ical cyst posterior right kidney. Ureters are not dilated. No retroperitoneal adenopathy. The bladder
 distends smoothly.

 

There is extensive fat stranding in the pelvis around the sigmoid colon. There are extraluminal air b
ubbles. There are multiple sigmoid diverticula. Appendix not seen. There are bilateral fat-containing
 inguinal hernias. No evidence of a bowel obstruction. Delayed images show normal renal excretion.

 

The lumbar vertebrae have normal alignment. No compression fracture. The bony pelvis is intact. The h
ip joints are intact.

 

IMPRESSION:

There is pneumoperitoneum. There is sigmoid diverticulitis with fat stranding and extraluminal air bu
bbles. There is significant increase in the peritoneal air compared to last exam 2 days ago. There is
 increased fat stranding that raises the possibility of peritonitis. Follow-up recommended.

## 2022-09-01 NOTE — P.PN
Progress Note - Text


Progress Note Date: 09/01/22





Patient reevaluated this evening.  Wife at bedside.  Patient reports multiple 

bowel movements.  He is passing moderate flatus.  Abdominal pain controlled.  He

reports moderate improvement of abdominal pain since admission.  Tolerating ice 

chips.  Patient seen by multiple consultants including infectious disease, 

cardiology, he had placement of his PICC line for home IV antibiotics.  No 

further fevers.





EXAM: Nontoxic in appearance.  Abdomen without peritonitis.  Mild epigastrium 

pain and left lower quadrant. No rebound.  Abdomen protuberant.  Mild 

distention.





ASSESSMENT: Overall, patient clinically stable and improving from perforated 

diverticulitis  





PLAN:


Repeat labs in the morning.  


With clinical improvement, may start clear liquid diet in the morning.  


Otherwise, in presence of decline, diverting colostomy also described to patient

and wife at the bedside.

## 2022-09-01 NOTE — IR
PICC LINE PLACEMENT:

 

HISTORY:  Infection requiring long-term antibiotic therapy

 

PROCEDURE:  Ultrasound and fluoroscopic guidance of PICC line placement.

 

COMPLICATIONS:  None

 

ANESTHESIA:  1. 1% Lidocaine locally.

 

FINDINGS/TECHNIQUE:  The procedure was explained to the patient.  The risks, complications, benefits 
and alternatives were discussed and any questions were answered.  Informed consent was obtained.  The
 patient was placed supine on the fluoroscopic table and prepped and draped in the usual sterile fash
ion.  Utilizing a  21 gauge needle and sonographic and fluoroscopic guidance, access in the left ceph
alic vein was achieved and there is placement of a 0.018 guidewire.  The vein is patent.  A 4-F sheat
h was placed over the guidewire.  The guidewire and dilator were removed and a 4-F. PICC line was gallo
jaclyn through the sheath with the tip at the level of the SVC.  The sheath was removed, the catheter wa
s flushed and sutured into position.  The patient was stable throughout the procedure and remained st
able upon discharge from the Department of Radiology. 

 

The vein puncture was patent under ultrasound. A gray scale image was obtained to document patency of
 the vein punctured.

 

All elements of the maximal barrier technique were utilized.

 

FLUOROSCOPY TIME:  0.2 minutes and 1 images submitted

 

IMPRESSION: 

Successful PICC line placement under ultrasound and fluoroscopic guidance.

## 2022-09-01 NOTE — P.PN
Subjective


Progress Note Date: 09/01/22





Patient is a 41-year-old male with no significant past medical history presents 

the ED for abdominal pain.  He was recently seen on 08/30/2022 for similar 

complaints.  CT abdomen and pelvis at that time demonstrated acute uncomplicated

sigmoid colon diverticulitis and patient was discharged home on Augmentin.  He 

presented back to the ED on 08/31/2022 for worsening abdominal pain.  Patient 

reports abdominal pain ongoing for the past 3 days.  Pain is generalized all 

over his abdomen.  Pain is colicky in nature.  Pain is 10 out of 10 in severity.

 Pain does not radiate.  Pain is associated with nausea and vomiting along with 

chills.  He denies any headache, lower extremity edema, cough, chest pain, 

shortness of breath, palpitations, changes in urination or bowel habits.  

Patient reports a decreased appetite.  He denies any lightheadedness, 

numbness/weakness/tingling of the extremities.  In the ER, he was tachycardic 

with heart rate of 96 and T-max of 100.4 Fahrenheit.  Vital signs otherwise st

able.  CBC showed leukocytosis of 19.1 with neutrophilia.  CMP showed sodium 134

glucose 108, total bilirubin of 2.6, AST of 79, ALT of 118.  Patient is admitted

for sepsis related to diverticulitis, failed outpatient therapy.





9/1


Nocturnist was called last night for worsening abdominal pain.  CT abdomen and 

pelvis was repeated which showed pneumoperitoneum.  Surgery was consulted and 

recommended conservative management, nothing by mouth, IV Tylenol and ibuprofen,

IV antibiotics.  Echocardiogram was done which showed EF of 55-60% with no 

regional wall motion abnormalities.  CBC shows improving leukocytosis of 16.4.  

INR 1.2.  Lactic acid negative.





General: non toxic, no distress, appears at stated age


Derm: warm, dry


Head: atraumatic, normocephalic, symmetric


Eyes: EOMI, no lid lag, anicteric sclera


Mouth: no lip lesion, mucus membranes moist


Cardiovascular: Tachycardic, no murmur


Lungs: CTA bilateral, no rhonchi, no rales , no accessory muscle use


Abdominal: soft, tenderness to palpation in all 4 quadrants with rebound 

tenderness, no guarding, no appreciable organomegaly


Ext: no gross muscle atrophy, no edema, no contractures


Neuro: no focal neuro deficits


Psych: Alert, oriented, appropriate affect 





#Sepsis


#Perforated sigmoid diverticulitis with pneumoperitoneum


#Transaminitis


#Morbid obesity





Patient currently meets sepsis criteria with tachycardia, leukocytosis and a pos

itive source of infection.  CT AP positive for acute diverticulitis.  Patient 

was started on Zosyn and Flagyl.  Scheduled Tylenol and Toradol IV for pain 

management.  Protonix 40 mg IV daily.  Blood cultures are pending.  Lactic acid 

negative.  Patient be placed on telemetry monitoring.  Normal saline at 130 mL 

per hour.  Continue nothing by mouth.  Surgery on board.  Infectious disease 

consulted.





Cardiology consulted for cardiac risk stratification.





Patient reports history of cholecystectomy.  Review of home medication shows 

that the patient is on testosterone supplementation.  





Patient will benefit from a structured weight loss program.





DVT prophylaxis: Heparin


Discussed with: Patient


Anticipated discharge: 3-4 days


Anticipated discharge place: Home


A total of 35 minutes was spent on the care of this complex patient more than 

50% of the time was spent in counseling and care coordination. 





Patient names his wife decision maker if he can't make decisions for himself.  

Patient would like to be full code.





PICC line to be placed today.  Patient will need long-term IV antibiotics on 

discharge.





Objective





- Vital Signs


Vital signs: 


                                   Vital Signs











Temp  98.9 F   09/01/22 03:55


 


Pulse  111 H  09/01/22 08:00


 


Resp  18   09/01/22 03:55


 


BP  123/78   09/01/22 03:55


 


Pulse Ox  92 L  09/01/22 03:55


 


FiO2      








                                 Intake & Output











 08/31/22 09/01/22 09/01/22





 18:59 06:59 18:59


 


Intake Total  3000 


 


Balance  3000 


 


Weight 138.346 kg 138.346 kg 138.346 kg


 


Intake:   


 


  Intake, IV Titration  2700 





  Amount   


 


    ACETAMINOPHEN IV (For NPO  100 





    ) 1,000 mg In Empty Bag 1   





    bag @ 400 mls/hr IVPB   





    Q6HR [facility] Rx#:[id number]   


 


    Piperacillin-Tazobactam 3  100 





    .375 gm In Sodium   





    Chloride 0.9% 100 ml @ 25   





    mls/hr IVPB Q6H [facility] Rx#:   





    [id number]   


 


    Sodium Chloride 0.9% 1,  1300 





    000 ml @ 130 mls/hr IV .   





    Q7H42M [facility] Rx#:[id number]   


 


    Sodium Chloride 0.9% 1,  1000 





    000 ml @ 999 mls/hr IV .   





    Q1H1M ONE Rx#:[id number]   


 


    metroNIDAZOLE-NS   200 





    mg In Saline 1 100ml.bag   





    @ 100 mls/hr IVPB Q8H [facility]   





    Rx#:[id number]   


 


  Oral  300 


 


Other:   


 


  Voiding Method  Toilet 


 


  # Voids  6 


 


  # Bowel Movements  2 














- Labs


CBC & Chem 7: 


                                 09/01/22 06:21





                                 09/01/22 06:21


Labs: 


                  Abnormal Lab Results - Last 24 Hours (Table)











  08/31/22 08/31/22 09/01/22 Range/Units





  14:43 14:43 06:21 


 


WBC  19.1 H   16.4 H  (3.8-10.6)  k/uL


 


Neutrophils #  17.1 H   14.9 H  (1.3-7.7)  k/uL


 


Lymphocytes #  0.9 L   0.7 L  (1.0-4.8)  k/uL


 


PT     (9.0-12.0)  sec


 


INR     (<1.2)  


 


Sodium   134 L   (137-145)  mmol/L


 


Glucose   108 H   (74-99)  mg/dL


 


Calcium     (8.4-10.2)  mg/dL


 


Total Bilirubin   2.6 H   (0.2-1.3)  mg/dL


 


AST   79 H   (17-59)  U/L


 


ALT   118 H   (4-49)  U/L


 


Total Protein     (6.3-8.2)  g/dL


 


Albumin     (3.5-5.0)  g/dL














  09/01/22 09/01/22 Range/Units





  06:21 09:00 


 


WBC    (3.8-10.6)  k/uL


 


Neutrophils #    (1.3-7.7)  k/uL


 


Lymphocytes #    (1.0-4.8)  k/uL


 


PT   12.3 H  (9.0-12.0)  sec


 


INR   1.2 H  (<1.2)  


 


Sodium  136 L   (137-145)  mmol/L


 


Glucose  100 H   (74-99)  mg/dL


 


Calcium  7.7 L   (8.4-10.2)  mg/dL


 


Total Bilirubin  1.8 H   (0.2-1.3)  mg/dL


 


AST    (17-59)  U/L


 


ALT  81 H   (4-49)  U/L


 


Total Protein  5.5 L   (6.3-8.2)  g/dL


 


Albumin  3.1 L   (3.5-5.0)  g/dL

## 2022-09-01 NOTE — CA
Transthoracic Echo Report 

 Name: Fletcher Pino 

 MRN:    [id number] 

 Age:    41     Gender:     M 

 

 :    1980 

 Exam Date:     2022 07:56 

 Exam Location: [facility] 

 Ht (in):     71     Wt (lb):     305 

 Ordering Physician:        Esther Alexander MD 

 Attending/Referring Phys:         Catherine GIBSON 

 Technician         Bhargavi Mayorga RDCS 

 Procedure CPT: 

 Indications:       abnormal ekg 

 

 Cardiac Hx: 

 Technical Quality:      Technically difficult study 

 Contrast 1:    Lumason                     Total Dose (mL):      1 

 Contrast 2:                                Total Dose (mL): 

 

 MEASUREMENTS  (Male / Female) Normal Values 

 2D ECHO 

 LV Diastolic Diameter PLAX        5.4 cm                4.2 - 5.9 / 3.9 - 5.3 cm 

 LV Systolic Diameter PLAX         4.1 cm                 

 IVS Diastolic Thickness           1.2 cm                0.6 - 1.0 / 0.6 - 0.9 cm 

 LVPW Diastolic Thickness          1.2 cm                0.6 - 1.0 / 0.6 - 0.9 cm 

 LV Relative Wall Thickness        0.4                    

 RV Internal Dim ED PLAX           3.8 cm                 

 

 M-MODE 

 Aortic Root Diameter MM           4.0 cm                 

 LA Systolic Diameter MM           3.0 cm                 

 LA Ao Ratio MM                    0.7                    

 MV E Point Septal Separation      0.5 cm                 

 AV Cusp Separation MM             2.4 cm                 

 

 DOPPLER 

 AV Peak Velocity                  134.4 cm/s             

 AV Peak Gradient                  7.2 mmHg               

 MV Area PHT                       6.0 cm???                

 MR Peak Velocity                  117.2 cm/s             

 MR Peak Gradient                  5.5 mmHg               

 Mitral E Point Velocity           81.6 cm/s              

 Mitral A Point Velocity           67.1 cm/s              

 Mitral E to A Ratio               1.2                    

 MV Deceleration Time              125.8 ms               

 TR Peak Velocity                  137.9 cm/s             

 TR Peak Gradient                  7.6 mmHg               

 Right Ventricular Systolic Press  12.6 mmHg              

 PV Peak Velocity                  137.9 cm/s             

 PV Peak Gradient                  7.6 mmHg               

 PI Peak Gradient                  12.5 mmHg              

 

 

 FINDINGS 

 Left Ventricle 

 Mildly increased septal wall thickness. Left ventricular ejection fraction is  

 estimated at 55-60%. No obvious regional wall motion abnormalities. Left  

 ventricular cavity size normal. 

 

 Right Ventricle 

 The right ventricle is normal in size and function. 

 

 Right Atrium 

 The right atrium is normal in size. 

 

 Left Atrium 

 The left atrium is normal in size. 

 

 Mitral Valve 

 Structurally normal mitral valve without significant stenosis or prolapse.   

 There is trace mitral regurgitation. 

 

 Aortic Valve 

 Structurally normal aortic valve without significant sclerosis or stenosis.   

 There is no aortic regurgitation. 

 

 Tricuspid Valve 

 Structurally normal tricuspid valve without significant stenosis.  Pulmonary  

 artery systolic pressure is normal.  Trace tricuspid regurgitation. 

 

 Pulmonic Valve 

 Structurally normal pulmonic valve without significant stenosis.  There is no  

 pulmonic regurgitation. 

 

 Pericardium 

 Normal pericardium without effusion. 

 

 Aorta 

 Aortic dilatation measuring at 3.8 cm 

 

 CONCLUSIONS 

 Mild LVH 

 Normal left ventricular EF 55-60% 

 Trace mitral regurgitation 

 RVSP 12 

 Aortic root measuring 3.8 cm 

 Previewed by:  

 Dr. Xander Hester DO 

 (Electronically Signed) 

 Final Date:      2022 10:52 room air

## 2022-09-01 NOTE — P.CONS
History of Present Illness





- Reason for Consult


Consult date: 09/01/22


Diverticulitis failed outpatient antibiotics


Requesting physician: Esther Alexander





- Chief Complaint


Abdominal pain x few days





- History of Present Illness


Patient is a 41-year-old  male started having a pain in the lower 

abdominal area about 3 days ago patient was describing the pain to be more sharp

and  involving the left lower abdominal area with intensity almost 10 out of 10 

no radiation associated nausea but no vomiting and did have some constipation p

atient was evaluated at [facility] ER on 8/30/2022 patient did have a CT

suggestive of acute diverticulitis patient was sent home on oral Augmentin 

apparently the patient has taken about 3 doses however patient did have 

worsening of abdominal pain for the patient presented back to the hospital 

yesterday on arrival to the ER the patient did have fever 100.4 F at patient 

was tachycardic did have white count of 19.1 levels observed mildly elevated 

patient did have blood cultures drawn which are currently pending patient did 

have a CT of abdominal pelvis completed which shows pneumoperitoneum similar 

diverticulitis with fat stranding and extraluminal air bubbles significant 

increase in the peritoneal air compared to 2 days ago patient has been evaluated

by surgery and initial pain was 4/2 this morning however the patient mention 

some improvement with antibiotic surgery was put on hold and infectious disease 

was consulted for further management of antibiotic therapy








Review of Systems


Positive point has been  mentioned in the HPI rest of the systems are negative








Past Medical History


Past Medical History: No Reported History


History of Any Multi-Drug Resistant Organisms: None Reported


Past Surgical History: Cholecystectomy, Orthopedic Surgery


Additional Past Surgical History / Comment(s): sinus


Past Anesthesia/Blood Transfusion Reactions: No Reported Reaction


Additional Past Anesthesia/Blood Transfusion Reaction / Comm: Nauea


Past Psychological History: No Psychological Hx Reported


Smoking Status: Never smoker


Past Alcohol Use History: None Reported


Past Drug Use History: None Reported





Medications and Allergies


                                Home Medications











 Medication  Instructions  Recorded  Confirmed  Type


 


Omeprazole 20 mg PO DAILY 06/14/21 08/31/22 History


 


Amoxic-Pot Clav 875-125Mg 1 tab PO Q12HR #20 tab 08/30/22 08/31/22 Rx





[Augmentin 875-125]    


 


Ondansetron Odt [Zofran Odt] 4 mg PO Q8HR PRN #10 tab 08/30/22 08/31/22 Rx


 


Testosterone Cypionate 200 mg IM Q14D 08/30/22 08/31/22 History





[Depo-Testosterone]    








                                    Allergies











Allergy/AdvReac Type Severity Reaction Status Date / Time


 


No Known Allergies Allergy   Verified 08/31/22 16:06














Physical Exam


Vitals: 


                                   Vital Signs











  Temp Pulse Pulse Resp BP BP Pulse Ox


 


 09/01/22 08:00    111 H    


 


 09/01/22 05:56    111 H    


 


 09/01/22 05:24    111 H    


 


 09/01/22 03:55  98.9 F   124 H  18   123/78  92 L


 


 09/01/22 02:30  99.4 F   128 H  18   113/75  94 L


 


 09/01/22 00:19  98.3 F   128 H  18   109/77  95


 


 08/31/22 20:00    97  20   


 


 08/31/22 19:34  98.6 F   97  20   149/93  95


 


 08/31/22 18:30  100.4 F H  70   18  120/68   98


 


 08/31/22 14:31  100.4 F H  96   18  127/79   96








                                Intake and Output











 08/31/22 09/01/22 09/01/22





 22:59 06:59 14:59


 


Intake Total  3000 


 


Balance  3000 


 


Intake:   


 


  Intake, IV Titration  2700 





  Amount   


 


    ACETAMINOPHEN IV (For NPO  100 





    ) 1,000 mg In Empty Bag 1   





    bag @ 400 mls/hr IVPB   





    Q6HR [facility] Rx#:[id number]   


 


    Piperacillin-Tazobactam 3  100 





    .375 gm In Sodium   





    Chloride 0.9% 100 ml @ 25   





    mls/hr IVPB Q6H [facility] Rx#:   





    [id number]   


 


    Sodium Chloride 0.9% 1,  1300 





    000 ml @ 130 mls/hr IV .   





    Q7H42M [facility] Rx#:[id number]   


 


    Sodium Chloride 0.9% 1,  1000 





    000 ml @ 999 mls/hr IV .   





    Q1H1M ONE Rx#:[id number]   


 


    metroNIDAZOLE-NS   200 





    mg In Saline 1 100ml.bag   





    @ 100 mls/hr IVPB Q8H [facility]   





    Rx#:[id number]   


 


  Oral  300 


 


Other:   


 


  Voiding Method Toilet  


 


  # Voids  6 


 


  # Bowel Movements  2 


 


  Weight 138.346 kg  











GENERAL DESCRIPTION: Middle-aged male lying in bed, no distress. No tachypnea or

accessory muscle of respiration use.


HEENT: Shows Pallor , no scleral icterus. Oral mucous membrane is dry. No pha

ryngeal erythema or thrush


NECK: Trachea central, no thyromegaly.


LUNGS: Unlabored breathing. Clear to auscultation anteriorly. No wheeze or 

crackle.


HEART: S1, S2, regular rate and rhythm. No loud murmur


ABDOMEN: Soft, left lower quadrant tenderness  


EXTREMITIES: No edema of feet.


SKIN: No rash, no masses palpable.


NEUROLOGICAL: The patient is awake, alert, oriented x3, mood and affect normal.

















Results


CBC & Chem 7: 


                                 09/02/22 06:48





                                 09/02/22 06:48


Labs: 


                  Abnormal Lab Results - Last 24 Hours (Table)











  08/31/22 08/31/22 09/01/22 Range/Units





  14:43 14:43 06:21 


 


WBC  19.1 H   16.4 H  (3.8-10.6)  k/uL


 


Neutrophils #  17.1 H   14.9 H  (1.3-7.7)  k/uL


 


Lymphocytes #  0.9 L   0.7 L  (1.0-4.8)  k/uL


 


PT     (9.0-12.0)  sec


 


INR     (<1.2)  


 


Sodium   134 L   (137-145)  mmol/L


 


Glucose   108 H   (74-99)  mg/dL


 


Calcium     (8.4-10.2)  mg/dL


 


Total Bilirubin   2.6 H   (0.2-1.3)  mg/dL


 


AST   79 H   (17-59)  U/L


 


ALT   118 H   (4-49)  U/L


 


Total Protein     (6.3-8.2)  g/dL


 


Albumin     (3.5-5.0)  g/dL














  09/01/22 09/01/22 Range/Units





  06:21 09:00 


 


WBC    (3.8-10.6)  k/uL


 


Neutrophils #    (1.3-7.7)  k/uL


 


Lymphocytes #    (1.0-4.8)  k/uL


 


PT   12.3 H  (9.0-12.0)  sec


 


INR   1.2 H  (<1.2)  


 


Sodium  136 L   (137-145)  mmol/L


 


Glucose  100 H   (74-99)  mg/dL


 


Calcium  7.7 L   (8.4-10.2)  mg/dL


 


Total Bilirubin  1.8 H   (0.2-1.3)  mg/dL


 


AST    (17-59)  U/L


 


ALT  81 H   (4-49)  U/L


 


Total Protein  5.5 L   (6.3-8.2)  g/dL


 


Albumin  3.1 L   (3.5-5.0)  g/dL














Assessment and Plan


(1) Acute diverticulitis


Current Visit: Yes   Status: Acute   Code(s): K57.92 - DVTRCLI OF INTEST, PART 

UNSP, W/O PERF OR ABSCESS W/O BLEED   SNOMED Code(s): 158243171


   





(2) Failure of outpatient treatment


Current Visit: Yes   Status: Acute   Code(s): Z78.9 - OTHER SPECIFIED HEALTH 

STATUS   SNOMED Code(s): 982601214


   


Plan: 


1patient presented to hospital with sepsis in this patient did have a fever 

tachycardia elevated white count source is acute complicated diverticulitis with

perforation and will need to cover for the enteric gram-negative both aerobes 

and anaerobes.


2Zosyn 3.375 g every 8 hours should provide adequate antibiotic coverage.


3bowel rest and pain control.


We will follow on clinical condition and cultures to further adjust medication 

if needed


Thank you for this consultation will follow this patient along with you





Time with Patient: Greater than 30

## 2022-09-01 NOTE — P.GSCN
History of Present Illness


Consult date: 09/01/22


History of present illness: 











REASON FOR CONSULTATION: Perforated diverticulitis





HISTORY OF PRESENT ILLNESS: The patient is a 41 year old male who recently 

presented to the emergency room 08/30/2022 for left lower quadrant abdominal 

pain.  At that time he was diagnosed with uncomplicated diverticulitis.  He was 

placed on antibiotics and sent home.  He did present with tachycardia including 

with WBC  over 15,000.  He then returned back to the hospital the next day r

eporting increased left lower quadrant abdominal pain.  He reports his pain was 

10 out of 10 when he present to the hospital.  Upon transfer to his room 

yesterday afternoon, he complained primarily of epigastric pain.  He had 

temperatures over 100.0 Fahrenheit.  He later had a repeat computed tomography 

scan demonstrating perforated diverticulitis.  Since evaluation, patient has 

received Toradol, Dilaudid.  He reports his pain is tolerable.  He is able to 

move around in bed.  Overall, he reports having abdominal pain intermittently 

for over 1 month unaware of his recent diagnosis.  He reports constipation prior

to this event.  Denies moderate abdominal distention.  He also reports earlier 

this evening having an abdominal cramping with a bowel movement which propagated

the severity of his pain.  At this time, his pain is mild.  General surgery is 

consulted for perforated diverticulitis.





PAST MEDICAL HISTORY: 


See list and reviewed





PAST SURGICAL HISTORY: 


See list and reviewed





MEDICATIONS: 


See list and reviewed





ALLERGIES: 


See list and reviewed





SOCIAL HISTORY: See list and reviewed





FAMILY HISTORY:  See list and reviewed





REVIEW OF ORGAN SYSTEMS:


CONSTITUTIONAL:  No fevers or chills. Has morbid obesity, BMI 42.5


EYES: Denies any trouble with vision. No glasses.


HEENT:  No difficulties with hearing. No nosebleeds.  No difficulty swallowing. 


RESPIRATORY:  Denies pneumonia. Denies any troubles with breathing or dyspnea on

exertion. 


CARDIOVASCULAR:  Denies any chest pain, palpitations, or recent heart attacks. 


GASTROINTESTINAL: Denies fatty food intolerance.  Recent constipation.


GENITOURINARY:  Denies any blood in urine or increased urinary frequency.  


NEUROLOGICAL:  Denies any numbness or tingling along the distal extremities. No 

seizure disorders or headaches.


MUSCULOSKELETAL:  Has back pain, stiffness or joint arthritis. 


SKIN: No current skin cancer. No rash.


PSYCHIATRIC:  Denies current depression or suicidal thoughts.


ENDOCRINE:  Denies current thyroid disorders. Denies any blood sugar glucose 

intolerance.  Reports vitamin D deficiency.


HEME/LYMPHATIC: Denies any lumps and bumps around the neck. No recent deep 

venous thrombosis.


ALLERGY/IMMUNOLOGY:  No immunoglobulin therapy. No immune deficiencies.


BREAST: Denies current breast lumps, pain or nipple discharge.





PHYSICAL EXAM:


VITALS: Reviewed


CONSTITUTIONAL:  Well developed and in no acute distress. 


EYES:  Conjuctivae without sclera icterus.  Extraocular movements grossly 

intact. 


HEAD, EARS, NOSE, THROAT: Moist buccal mucosa. Head is atraumatic, 

normocephalic. Hears conversational speech. No nasal drainage. 


NECK:  Supple. No JV distention. No thyroidomegaly.


RESPIRATORY:  Non-labored respirations and equal bilateral excursions. No gross 

wheezes. 


CARDIOVASCULAR:   Palpable 2+ radial pulses.


ABDOMEN:  Obese, protuberant, mild distention.  No diffuse peritonitis.  

Localized tenderness epigastrium.  Mild tenderness left lower quadrant to mild 

palpation.


LYMPH: No neck lymphadenopathy. 


MUSCULOSKELETAL:  No clubbing cyanosis or edema.


SKIN:  Warm and well perfused with good skin turgor.


NEUROLOGIC: Cranial nerves II through XII grossly intact.  No focal or 

lateralizing signs. 


PSYCH:  Appropriate affect.  Alert and oriented to person, place and time. 

Displays appropriate insight.





CLINCAL LABS: Reviewed.  WBC elevated at 19.1.  Hemoglobin 14.4, normal.  

Creatinine normal 0.85.  LFTs elevated including total bilirubin 2.5.





IMAGING: Independently reviewed.  CT of the abdomen and pelvis from 08/30/2022 

demonstrates inflammation of the sigmoid colon.  Repeat computed tomography scan

09/01/2022 perforated sigmoid colon with free air along the anterior abdominal 

wall.  Punctate loculations of air of the upper abdomen noted.  This is my 

independent interpretation.  No abscesses or large fluid collection identified.





RADIOLOGY: Report reviewed of CT abdomen 09/01/2022 demonstrates new 

pneumoperitoneum and perforated sigmoid diverticulitis.





RECORDS: previous old records reviewed.  November 2019 demonstrates incomplete 

right bundle branch block, nonspecific ST abnormality, abnormal EKG.





EKG: Repeat EKG demonstrates similar changes with incomplete right bundle branch

block








ASSESSMENT: 


1.  Perforated sigmoid diverticulitis with pneumoperitoneum


2.  Morbid obesity due to excess calories, BMI 42.5


3.  Leukocytosis with fevers, sepsis


4.  Elevated liver enzymes





PLAN: 


1.  IV fluid hydration advised with recent elevated LFTs.  Repeat lactate level 

pending.


2.  Surgical options of exploratory laparotomy with descending colostomy was 

described.  Patient reports improvement of his abdominal pain.  Alternatively, 

serial abdominal exams with close management and IV antibiotics also discussed. 

Patient wanted conservative management with deferral of surgical intervention at

this time.


3.  Nothing by mouth.  May have ice chips.


4.  Scheduled nonnarcotic pain management reviewed and started with intravenous 

Tylenol and intravenous ibuprofen.


5.  Continue IV antibiotics Zosyn.


6.  Reviewed the patient that worsening of clinical decline, emergent surgical 

intervention warranted.


7.  Recommend echo for abnormal EKG.


8.  Recommend DVT prophylaxis with SCDs


9.  All questions including care plan discussed which shared decision-making 

performed with patient.  Patient agreeable with plan of care.





Thank you for this kind consultation.





Past Medical History


Past Medical History: No Reported History


History of Any Multi-Drug Resistant Organisms: None Reported


Past Surgical History: Cholecystectomy, Orthopedic Surgery


Additional Past Surgical History / Comment(s): sinus


Past Anesthesia/Blood Transfusion Reactions: No Reported Reaction


Additional Past Anesthesia/Blood Transfusion Reaction / Comm: Nauea


Past Psychological History: No Psychological Hx Reported


Smoking Status: Never smoker


Past Alcohol Use History: None Reported


Past Drug Use History: None Reported





Medications and Allergies


                                Home Medications











 Medication  Instructions  Recorded  Confirmed  Type


 


Omeprazole 20 mg PO DAILY 06/14/21 08/31/22 History


 


Amoxic-Pot Clav 875-125Mg 1 tab PO Q12HR #20 tab 08/30/22 08/31/22 Rx





[Augmentin 875-125]    


 


Ondansetron Odt [Zofran Odt] 4 mg PO Q8HR PRN #10 tab 08/30/22 08/31/22 Rx


 


Testosterone Cypionate 200 mg IM Q14D 08/30/22 08/31/22 History





[Depo-Testosterone]    








                                    Allergies











Allergy/AdvReac Type Severity Reaction Status Date / Time


 


No Known Allergies Allergy   Verified 08/31/22 16:06














Surgical - Exam


                                   Vital Signs











Temp Pulse Resp BP Pulse Ox


 


 100.4 F H  96   18   127/79   96 


 


 08/31/22 14:31  08/31/22 14:31  08/31/22 14:31  08/31/22 14:31  08/31/22 14:31














Results





- Labs





                                 08/31/22 14:43





                                 08/31/22 14:43


                  Abnormal Lab Results - Last 24 Hours (Table)











  08/31/22 08/31/22 Range/Units





  14:43 14:43 


 


WBC  19.1 H   (3.8-10.6)  k/uL


 


Neutrophils #  17.1 H   (1.3-7.7)  k/uL


 


Lymphocytes #  0.9 L   (1.0-4.8)  k/uL


 


Sodium   134 L  (137-145)  mmol/L


 


Glucose   108 H  (74-99)  mg/dL


 


Total Bilirubin   2.6 H  (0.2-1.3)  mg/dL


 


AST   79 H  (17-59)  U/L


 


ALT   118 H  (4-49)  U/L








                                 Diabetes panel











  08/31/22 Range/Units





  14:43 


 


Sodium  134 L  (137-145)  mmol/L


 


Potassium  3.7  (3.5-5.1)  mmol/L


 


Chloride  99  ()  mmol/L


 


Carbon Dioxide  22  (22-30)  mmol/L


 


BUN  14  (9-20)  mg/dL


 


Creatinine  0.85  (0.66-1.25)  mg/dL


 


Glucose  108 H  (74-99)  mg/dL


 


Calcium  8.5  (8.4-10.2)  mg/dL


 


AST  79 H  (17-59)  U/L


 


ALT  118 H  (4-49)  U/L


 


Alkaline Phosphatase  85  ()  U/L


 


Total Protein  6.8  (6.3-8.2)  g/dL


 


Albumin  4.0  (3.5-5.0)  g/dL








                                  Calcium panel











  08/31/22 Range/Units





  14:43 


 


Calcium  8.5  (8.4-10.2)  mg/dL


 


Albumin  4.0  (3.5-5.0)  g/dL








                                 Pituitary panel











  08/31/22 Range/Units





  14:43 


 


Sodium  134 L  (137-145)  mmol/L


 


Potassium  3.7  (3.5-5.1)  mmol/L


 


Chloride  99  ()  mmol/L


 


Carbon Dioxide  22  (22-30)  mmol/L


 


BUN  14  (9-20)  mg/dL


 


Creatinine  0.85  (0.66-1.25)  mg/dL


 


Glucose  108 H  (74-99)  mg/dL


 


Calcium  8.5  (8.4-10.2)  mg/dL








                                  Adrenal panel











  08/31/22 Range/Units





  14:43 


 


Sodium  134 L  (137-145)  mmol/L


 


Potassium  3.7  (3.5-5.1)  mmol/L


 


Chloride  99  ()  mmol/L


 


Carbon Dioxide  22  (22-30)  mmol/L


 


BUN  14  (9-20)  mg/dL


 


Creatinine  0.85  (0.66-1.25)  mg/dL


 


Glucose  108 H  (74-99)  mg/dL


 


Calcium  8.5  (8.4-10.2)  mg/dL


 


Total Bilirubin  2.6 H  (0.2-1.3)  mg/dL


 


AST  79 H  (17-59)  U/L


 


ALT  118 H  (4-49)  U/L


 


Alkaline Phosphatase  85  ()  U/L


 


Total Protein  6.8  (6.3-8.2)  g/dL


 


Albumin  4.0  (3.5-5.0)  g/dL

## 2022-09-02 LAB
ALBUMIN SERPL-MCNC: 2.9 G/DL (ref 3.5–5)
ALBUMIN/GLOB SERPL: 1.3 {RATIO}
ALP SERPL-CCNC: 63 U/L (ref 38–126)
ALT SERPL-CCNC: 56 U/L (ref 4–49)
ANION GAP SERPL CALC-SCNC: 11 MMOL/L
AST SERPL-CCNC: 29 U/L (ref 17–59)
BASOPHILS # BLD AUTO: 0 K/UL (ref 0–0.2)
BASOPHILS NFR BLD AUTO: 0 %
BUN SERPL-SCNC: 15 MG/DL (ref 9–20)
CALCIUM SPEC-MCNC: 7.7 MG/DL (ref 8.4–10.2)
CHLORIDE SERPL-SCNC: 104 MMOL/L (ref 98–107)
CO2 SERPL-SCNC: 25 MMOL/L (ref 22–30)
EOSINOPHIL # BLD AUTO: 0.1 K/UL (ref 0–0.7)
EOSINOPHIL NFR BLD AUTO: 0 %
ERYTHROCYTE [DISTWIDTH] IN BLOOD BY AUTOMATED COUNT: 4.21 M/UL (ref 4.3–5.9)
ERYTHROCYTE [DISTWIDTH] IN BLOOD: 13.3 % (ref 11.5–15.5)
GLOBULIN SER CALC-MCNC: 2.3 G/DL
GLUCOSE SERPL-MCNC: 96 MG/DL (ref 74–99)
HCT VFR BLD AUTO: 40.8 % (ref 39–53)
HGB BLD-MCNC: 13.2 GM/DL (ref 13–17.5)
LYMPHOCYTES # SPEC AUTO: 0.5 K/UL (ref 1–4.8)
LYMPHOCYTES NFR SPEC AUTO: 4 %
MCH RBC QN AUTO: 31.4 PG (ref 25–35)
MCHC RBC AUTO-ENTMCNC: 32.4 G/DL (ref 31–37)
MCV RBC AUTO: 96.9 FL (ref 80–100)
MONOCYTES # BLD AUTO: 0.6 K/UL (ref 0–1)
MONOCYTES NFR BLD AUTO: 4 %
NEUTROPHILS # BLD AUTO: 13.5 K/UL (ref 1.3–7.7)
NEUTROPHILS NFR BLD AUTO: 91 %
PLATELET # BLD AUTO: 224 K/UL (ref 150–450)
POTASSIUM SERPL-SCNC: 3.4 MMOL/L (ref 3.5–5.1)
PROT SERPL-MCNC: 5.2 G/DL (ref 6.3–8.2)
SODIUM SERPL-SCNC: 140 MMOL/L (ref 137–145)
WBC # BLD AUTO: 14.9 K/UL (ref 3.8–10.6)

## 2022-09-02 RX ADMIN — HYDROMORPHONE HYDROCHLORIDE PRN MG: 1 INJECTION, SOLUTION INTRAMUSCULAR; INTRAVENOUS; SUBCUTANEOUS at 03:03

## 2022-09-02 RX ADMIN — HYDROMORPHONE HYDROCHLORIDE PRN MG: 1 INJECTION, SOLUTION INTRAMUSCULAR; INTRAVENOUS; SUBCUTANEOUS at 14:50

## 2022-09-02 RX ADMIN — HYDROMORPHONE HYDROCHLORIDE PRN MG: 1 INJECTION, SOLUTION INTRAMUSCULAR; INTRAVENOUS; SUBCUTANEOUS at 11:23

## 2022-09-02 RX ADMIN — ONDANSETRON PRN MG: 2 INJECTION INTRAMUSCULAR; INTRAVENOUS at 08:21

## 2022-09-02 RX ADMIN — SIMETHICONE SCH MG: 20 SUSPENSION/ DROPS ORAL at 08:05

## 2022-09-02 RX ADMIN — METRONIDAZOLE SCH MLS/HR: 500 INJECTION, SOLUTION INTRAVENOUS at 21:41

## 2022-09-02 RX ADMIN — CEFAZOLIN SCH MLS/HR: 330 INJECTION, POWDER, FOR SOLUTION INTRAMUSCULAR; INTRAVENOUS at 16:36

## 2022-09-02 RX ADMIN — ONDANSETRON PRN MG: 2 INJECTION INTRAMUSCULAR; INTRAVENOUS at 14:57

## 2022-09-02 RX ADMIN — ACETAMINOPHEN SCH MLS/HR: 10 INJECTION, SOLUTION INTRAVENOUS at 09:31

## 2022-09-02 RX ADMIN — PIPERACILLIN AND TAZOBACTAM SCH MLS/HR: 3; .375 INJECTION, POWDER, FOR SOLUTION INTRAVENOUS at 14:56

## 2022-09-02 RX ADMIN — ACETAMINOPHEN SCH MLS/HR: 10 INJECTION, SOLUTION INTRAVENOUS at 23:13

## 2022-09-02 RX ADMIN — KETOROLAC TROMETHAMINE SCH MG: 15 INJECTION, SOLUTION INTRAMUSCULAR; INTRAVENOUS at 17:25

## 2022-09-02 RX ADMIN — METRONIDAZOLE SCH MLS/HR: 500 INJECTION, SOLUTION INTRAVENOUS at 11:23

## 2022-09-02 RX ADMIN — HYDROMORPHONE HYDROCHLORIDE PRN MG: 1 INJECTION, SOLUTION INTRAMUSCULAR; INTRAVENOUS; SUBCUTANEOUS at 22:28

## 2022-09-02 RX ADMIN — SIMETHICONE SCH MG: 20 SUSPENSION/ DROPS ORAL at 16:42

## 2022-09-02 RX ADMIN — METRONIDAZOLE SCH MLS/HR: 500 INJECTION, SOLUTION INTRAVENOUS at 03:00

## 2022-09-02 RX ADMIN — HYDROMORPHONE HYDROCHLORIDE PRN MG: 1 INJECTION, SOLUTION INTRAMUSCULAR; INTRAVENOUS; SUBCUTANEOUS at 05:14

## 2022-09-02 RX ADMIN — ACETAMINOPHEN SCH MLS/HR: 10 INJECTION, SOLUTION INTRAVENOUS at 16:38

## 2022-09-02 RX ADMIN — HYDROMORPHONE HYDROCHLORIDE PRN MG: 1 INJECTION, SOLUTION INTRAMUSCULAR; INTRAVENOUS; SUBCUTANEOUS at 08:22

## 2022-09-02 RX ADMIN — HEPARIN SODIUM SCH UNIT: 5000 INJECTION INTRAVENOUS; SUBCUTANEOUS at 08:04

## 2022-09-02 RX ADMIN — SIMETHICONE SCH MG: 20 SUSPENSION/ DROPS ORAL at 21:43

## 2022-09-02 RX ADMIN — PANTOPRAZOLE SODIUM SCH MG: 40 INJECTION, POWDER, FOR SOLUTION INTRAVENOUS at 08:04

## 2022-09-02 RX ADMIN — KETOROLAC TROMETHAMINE SCH MG: 15 INJECTION, SOLUTION INTRAMUSCULAR; INTRAVENOUS at 11:21

## 2022-09-02 RX ADMIN — HYDROMORPHONE HYDROCHLORIDE PRN MG: 1 INJECTION, SOLUTION INTRAMUSCULAR; INTRAVENOUS; SUBCUTANEOUS at 19:45

## 2022-09-02 RX ADMIN — PIPERACILLIN AND TAZOBACTAM SCH MLS/HR: 3; .375 INJECTION, POWDER, FOR SOLUTION INTRAVENOUS at 08:04

## 2022-09-02 RX ADMIN — KETOROLAC TROMETHAMINE SCH MG: 15 INJECTION, SOLUTION INTRAMUSCULAR; INTRAVENOUS at 23:57

## 2022-09-02 RX ADMIN — METRONIDAZOLE SCH MLS/HR: 500 INJECTION, SOLUTION INTRAVENOUS at 17:26

## 2022-09-02 RX ADMIN — CEFAZOLIN SCH MLS/HR: 330 INJECTION, POWDER, FOR SOLUTION INTRAMUSCULAR; INTRAVENOUS at 14:57

## 2022-09-02 RX ADMIN — SIMETHICONE SCH: 20 SUSPENSION/ DROPS ORAL at 12:49

## 2022-09-02 RX ADMIN — CEFAZOLIN SCH MLS/HR: 330 INJECTION, POWDER, FOR SOLUTION INTRAMUSCULAR; INTRAVENOUS at 05:59

## 2022-09-02 RX ADMIN — HYDROMORPHONE HYDROCHLORIDE PRN MG: 1 INJECTION, SOLUTION INTRAMUSCULAR; INTRAVENOUS; SUBCUTANEOUS at 16:36

## 2022-09-02 RX ADMIN — ONDANSETRON PRN MG: 2 INJECTION INTRAMUSCULAR; INTRAVENOUS at 20:03

## 2022-09-02 RX ADMIN — KETOROLAC TROMETHAMINE SCH MG: 15 INJECTION, SOLUTION INTRAMUSCULAR; INTRAVENOUS at 05:13

## 2022-09-02 RX ADMIN — HEPARIN SODIUM SCH UNIT: 5000 INJECTION INTRAVENOUS; SUBCUTANEOUS at 19:48

## 2022-09-02 RX ADMIN — PIPERACILLIN AND TAZOBACTAM SCH MLS/HR: 3; .375 INJECTION, POWDER, FOR SOLUTION INTRAVENOUS at 02:20

## 2022-09-02 RX ADMIN — ACETAMINOPHEN SCH MLS/HR: 10 INJECTION, SOLUTION INTRAVENOUS at 04:09

## 2022-09-02 RX ADMIN — PIPERACILLIN AND TAZOBACTAM SCH MLS/HR: 3; .375 INJECTION, POWDER, FOR SOLUTION INTRAVENOUS at 19:50

## 2022-09-02 NOTE — P.PN
Subjective


Progress Note Date: 09/02/22











CHIEF COMPLAINT: Perforated diverticulitis





HISTORY OF PRESENT ILLNESS: The patient is a 41 year old male who presented to 

the hospital with perforated diverticulitis.  Patient reports improved abdominal

pain today compared to yesterday.  He has been started on a liquid diet per 

hospitalist.  No reports of fevers.  No chills.  He is passing flatus and having

bowel movements.





REVIEW OF ORGAN SYSTEMS:


CONSTITUTIONAL: No current fevers or chills. Has morbid obesity, BMI 42.5


GASTROINTESTINAL: Denies fatty food intolerance.  As above, having flatus and 

bowel movements.


MUSCULOSKELETAL:  Has back pain, stiffness or joint arthritis. 





PHYSICAL EXAM:


VITALS: Reviewed


CONSTITUTIONAL:  Well developed and in no acute distress. 


EYES:  Conjuctivae without sclera icterus.  Extraocular movements grossly i

ntact. 


HEAD, EARS, NOSE, THROAT: Moist buccal mucosa. Head is atraumatic, 

normocephalic. Hears conversational speech. No nasal drainage. 


RESPIRATORY:  Non-labored respirations and equal bilateral excursions.  Oxygen 

saturation less than 95%.


CARDIOVASCULAR:   Palpable 2+ radial pulses.


ABDOMEN:  Obese, protuberant.  No rebound or guarding or rigidity or 

peritonitis.  Tenderness left lower quadrant moderately improved.


MUSCULOSKELETAL:  No clubbing cyanosis or edema.


SKIN:  Warm and well perfused with good skin turgor.


NEUROLOGIC: Cranial nerves II through XII grossly intact.  No focal or lat

eralizing signs. 


PSYCH:  Appropriate affect.  Alert and oriented to person, place and time. 

Displays appropriate insight.





CLINCAL LABS: Reviewed.  WBC elevated at 19.1 now 16.1, down to 14.9.  LFTs 

trending downward.





ASSESSMENT: 


1.  Perforated sigmoid diverticulitis with pneumoperitoneum


2.  Morbid obesity due to excess calories, BMI 42.5


3.  Leukocytosis with fevers, sepsis


4.  Elevated liver enzymes





PLAN: 


1.  I reviewed with the patient his clinical course.  He reports improvement of 

his symptoms and wishes to defer surgical intervention.


2.  Continue IV antibiotics.  Antibiotic management per infectious disease.


3.  Cautious advancement of diet pending clinical improvement.


4.  Recommend Tian for nutritional supplement and recovery


5.  Patient notified that I will be out of town for 2 weeks.  Dr. Ángel smalls.


6.  Pulmonary toilet with incentive spirometer advised and reviewed with his 

nurse.





Objective





- Vital Signs


Vital signs: 


                                   Vital Signs











Temp  98.3 F   09/02/22 12:41


 


Pulse  103 H  09/02/22 12:41


 


Resp  16   09/02/22 12:41


 


BP  132/86   09/02/22 12:41


 


Pulse Ox  93 L  09/02/22 12:41


 


FiO2      








                                 Intake & Output











 09/01/22 09/02/22 09/02/22





 18:59 06:59 18:59


 


Intake Total 2060 2000 


 


Balance 2060 2000 


 


Weight 138.346 kg  


 


Intake:   


 


  Intake, IV Titration 2060 2000 





  Amount   


 


    ACETAMINOPHEN IV (For NPO  200 





    ) 1,000 mg In Empty Bag 1   





    bag @ 400 mls/hr IVPB   





    Q6H [facility] Rx#:[id number]   


 


    ACETAMINOPHEN IV (For   





    ) 1,000 mg In Empty Bag 1   





    bag @ 400 mls/hr IVPB   





    Q6HR [facility] Rx#:[id number]   


 


    Piperacillin-Tazobactam 3 200 200 





    .375 gm In Sodium   





    Chloride 0.9% 100 ml @ 25   





    mls/hr IVPB Q6H [facility] Rx#:   





    [id number]   


 


    Sodium Chloride 0.9% 1, 1560 1400 





    000 ml @ 130 mls/hr IV .   





    Q7H42M [facility] Rx#:[id number]   


 


    metroNIDAZOLE-NS  200 200 





    mg In Saline 1 100ml.bag   





    @ 100 mls/hr IVPB Q6H [facility]   





    Rx#:[id number]   


 


Other:   


 


  Voiding Method  Toilet Toilet


 


  # Voids 4 2 


 


  # Bowel Movements 4 2 














- Labs


CBC & Chem 7: 


                                 09/02/22 06:48





                                 09/02/22 06:48


Labs: 


                  Abnormal Lab Results - Last 24 Hours (Table)











  09/02/22 09/02/22 Range/Units





  06:48 06:48 


 


WBC  14.9 H   (3.8-10.6)  k/uL


 


RBC  4.21 L   (4.30-5.90)  m/uL


 


Neutrophils #  13.5 H   (1.3-7.7)  k/uL


 


Lymphocytes #  0.5 L   (1.0-4.8)  k/uL


 


Potassium   3.4 L  (3.5-5.1)  mmol/L


 


Calcium   7.7 L  (8.4-10.2)  mg/dL


 


ALT   56 H  (4-49)  U/L


 


Total Protein   5.2 L  (6.3-8.2)  g/dL


 


Albumin   2.9 L  (3.5-5.0)  g/dL








                      Microbiology - Last 24 Hours (Table)











 08/31/22 14:43 Blood Culture - Preliminary





 Blood    No Growth after 24 hours


 


 08/31/22 14:43 Blood Culture - Preliminary





 Blood    No Growth after 24 hours

## 2022-09-02 NOTE — P.PN
Subjective


Progress Note Date: 09/02/22


Principal diagnosis: 


Acute complicated diverticulitis





Patient is a 41-year-old  male presented to the hospital with abdominal

pain with a recent diagnosis of diverticulitis failing outpatient oral Augmentin

therapy with repeat CAT scan shows perforation.  


 on today's evaluation that is 09/02/2022, the patient denies having any fever 

or any chills, the patient abdominal pain has slightly decreased in intensity 

the patient denies any nausea and vomiting he did have some loose stool denies 

any chest pain shortness of breath or cough








Objective





- Vital Signs


Vital signs: 


                                   Vital Signs











Temp  98.3 F   09/02/22 12:41


 


Pulse  103 H  09/02/22 12:41


 


Resp  16   09/02/22 12:41


 


BP  132/86   09/02/22 12:41


 


Pulse Ox  93 L  09/02/22 12:41


 


FiO2      








                                 Intake & Output











 09/01/22 09/02/22 09/02/22





 18:59 06:59 18:59


 


Intake Total 2060 2000 


 


Balance 2060 2000 


 


Weight 138.346 kg  


 


Intake:   


 


  Intake, IV Titration 2060 2000 





  Amount   


 


    ACETAMINOPHEN IV (For NPO  200 





    ) 1,000 mg In Empty Bag 1   





    bag @ 400 mls/hr IVPB   





    Q6H [facility] Rx#:[id number]   


 


    ACETAMINOPHEN IV (For   





    ) 1,000 mg In Empty Bag 1   





    bag @ 400 mls/hr IVPB   





    Q6HR [facility] Rx#:[id number]   


 


    Piperacillin-Tazobactam 3 200 200 





    .375 gm In Sodium   





    Chloride 0.9% 100 ml @ 25   





    mls/hr IVPB Q6H [facility] Rx#:   





    [id number]   


 


    Sodium Chloride 0.9% 1, 1560 1400 





    000 ml @ 130 mls/hr IV .   





    Q7H42M [facility] Rx#:[id number]   


 


    metroNIDAZOLE-NS  200 200 





    mg In Saline 1 100ml.bag   





    @ 100 mls/hr IVPB Q6H [facility]   





    Rx#:[id number]   


 


Other:   


 


  Voiding Method  Toilet Toilet


 


  # Voids 4 2 


 


  # Bowel Movements 4 2 














- Exam


GENERAL DESCRIPTION: Middle-aged male lying in bed in no distress





RESPIRATORY SYSTEM: Unlabored breathing , decreased breath sounds at bases





HEART: S1 S2 regular rate and rhythm ,





ABDOMEN: Soft , mild distention and tenderness





EXTREMITIES: No edema feet








- Labs


CBC & Chem 7: 


                                 09/02/22 06:48





                                 09/02/22 06:48


Labs: 


                  Abnormal Lab Results - Last 24 Hours (Table)











  09/02/22 09/02/22 Range/Units





  06:48 06:48 


 


WBC  14.9 H   (3.8-10.6)  k/uL


 


RBC  4.21 L   (4.30-5.90)  m/uL


 


Neutrophils #  13.5 H   (1.3-7.7)  k/uL


 


Lymphocytes #  0.5 L   (1.0-4.8)  k/uL


 


Potassium   3.4 L  (3.5-5.1)  mmol/L


 


Calcium   7.7 L  (8.4-10.2)  mg/dL


 


ALT   56 H  (4-49)  U/L


 


Total Protein   5.2 L  (6.3-8.2)  g/dL


 


Albumin   2.9 L  (3.5-5.0)  g/dL








                      Microbiology - Last 24 Hours (Table)











 08/31/22 14:43 Blood Culture - Preliminary





 Blood    No Growth after 24 hours


 


 08/31/22 14:43 Blood Culture - Preliminary





 Blood    No Growth after 24 hours














Assessment and Plan


(1) Acute diverticulitis


Current Visit: Yes   Status: Acute   Code(s): K57.92 - DVTRCLI OF INTEST, PART 

UNSP, W/O PERF OR ABSCESS W/O BLEED   SNOMED Code(s): 918009485


   





(2) Perforated diverticulum


Current Visit: Yes   Status: Acute   Code(s): K57.80 - DVTRCLI OF INTEST, PART 

UNSP, W PERF AND ABSCESS W/O BLEED   SNOMED Code(s): 61275709


   


Plan: 


1patient presented to hospital with sepsis in this patient did have a fever 

tachycardia elevated white count source is acute complicated diverticulitis with

perforation and will need to cover for the enteric gram-negative both aerobes 

and anaerobes.


2patient seemed to have shown some improvement and will continue Zosyn 3.375 g 

every 8 hours along with bowel rest and pain control.


 


Time with Patient: Less than 30

## 2022-09-02 NOTE — P.PN
Subjective


Progress Note Date: 09/02/22





Patient is a 41-year-old male with no significant past medical history presents 

the ED for abdominal pain.  He was recently seen on 08/30/2022 for similar 

complaints.  CT abdomen and pelvis at that time demonstrated acute uncomplicated

sigmoid colon diverticulitis and patient was discharged home on Augmentin.  He 

presented back to the ED on 08/31/2022 for worsening abdominal pain.  Patient 

reports abdominal pain ongoing for the past 3 days.  Pain is generalized all 

over his abdomen.  Pain is colicky in nature.  Pain is 10 out of 10 in severity.

 Pain does not radiate.  Pain is associated with nausea and vomiting along with 

chills.  He denies any headache, lower extremity edema, cough, chest pain, 

shortness of breath, palpitations, changes in urination or bowel habits.  

Patient reports a decreased appetite.  He denies any lightheadedness, 

numbness/weakness/tingling of the extremities.  In the ER, he was tachycardic 

with heart rate of 96 and T-max of 100.4 Fahrenheit.  Vital signs otherwise st

able.  CBC showed leukocytosis of 19.1 with neutrophilia.  CMP showed sodium 134

glucose 108, total bilirubin of 2.6, AST of 79, ALT of 118.  Patient is admitted

for sepsis related to diverticulitis, failed outpatient therapy.





9/1


Nocturnist was called last night for worsening abdominal pain.  CT abdomen and 

pelvis was repeated which showed pneumoperitoneum.  Surgery was consulted and 

recommended conservative management, nothing by mouth, IV Tylenol and ibuprofen,

IV antibiotics.  Echocardiogram was done which showed EF of 55-60% with no 

regional wall motion abnormalities.  CBC shows improving leukocytosis of 16.4.  

INR 1.2.  Lactic acid negative.





9/2


Patient reports improvement in his abdominal pain. Had 2 bowel movements and is 

passing gas. Leukocytosis improved to 14.9. CMP shows K 3.4, Ca 7.7 and ALT 56.





General: non toxic, no distress, appears at stated age


Derm: warm, dry


Head: atraumatic, normocephalic, symmetric


Eyes: EOMI, no lid lag, anicteric sclera


Mouth: no lip lesion, mucus membranes moist


Cardiovascular: Tachycardic, no murmur


Lungs: CTA bilateral, no rhonchi, no rales , no accessory muscle use


Abdominal: soft, tenderness to palpation in all 4 quadrants without rebound 

tenderness, no guarding, no appreciable organomegaly


Ext: no gross muscle atrophy, no edema, no contractures


Neuro: no focal neuro deficits


Psych: Alert, oriented, appropriate affect 





#Sepsis


#Perforated sigmoid diverticulitis with pneumoperitoneum


#Transaminitis


#Morbid obesity





Patient currently meets sepsis criteria with tachycardia, leukocytosis and a 

positive source of infection.  CT AP positive for acute diverticulitis.  Patient

was started on Zosyn and Flagyl.  Scheduled Tylenol and Toradol IV for pain 

management.  Protonix 40 mg IV daily.  Blood cultures are negative so far.  La

ctic acid negative.  Patient be placed on telemetry monitoring.  Normal saline 

at 130 mL per hour.  Clear liquid diet and advance as tolerated.  Surgery on 

board.  Infectious disease consulted.





Cardiology consulted for cardiac risk stratification.





Patient reports history of cholecystectomy.  Review of home medication shows 

that the patient is on testosterone supplementation.  





Patient will benefit from a structured weight loss program.





DVT prophylaxis: Heparin


Discussed with: Patient


Anticipated discharge: 2-3 days


Anticipated discharge place: Home





Patient names his wife decision maker if he can't make decisions for himself.  

Patient would like to be full code.





Patient will need long-term IV antibiotics on discharge.





Objective





- Vital Signs


Vital signs: 


                                   Vital Signs











Temp  98.9 F   09/02/22 05:00


 


Pulse  90   09/02/22 09:07


 


Resp  15   09/02/22 05:00


 


BP  142/67   09/02/22 09:07


 


Pulse Ox  93 L  09/02/22 05:00


 


FiO2      








                                 Intake & Output











 09/01/22 09/02/22 09/02/22





 18:59 06:59 18:59


 


Intake Total 2060 2000 


 


Balance 2060 2000 


 


Weight 138.346 kg  


 


Intake:   


 


  Intake, IV Titration 2060 2000 





  Amount   


 


    ACETAMINOPHEN IV (For NPO  200 





    ) 1,000 mg In Empty Bag 1   





    bag @ 400 mls/hr IVPB   





    Q6H [facility] Rx#:[id number]   


 


    ACETAMINOPHEN IV (For   





    ) 1,000 mg In Empty Bag 1   





    bag @ 400 mls/hr IVPB   





    Q6HR [facility] Rx#:[id number]   


 


    Piperacillin-Tazobactam 3 200 200 





    .375 gm In Sodium   





    Chloride 0.9% 100 ml @ 25   





    mls/hr IVPB Q6H [facility] Rx#:   





    [id number]   


 


    Sodium Chloride 0.9% 1, 1560 1400 





    000 ml @ 130 mls/hr IV .   





    Q7H42M [facility] Rx#:[id number]   


 


    metroNIDAZOLE-NS  200 200 





    mg In Saline 1 100ml.bag   





    @ 100 mls/hr IVPB Q6H [facility]   





    Rx#:[id number]   


 


Other:   


 


  Voiding Method  Toilet 


 


  # Voids 4 2 


 


  # Bowel Movements 4 2 














- Labs


CBC & Chem 7: 


                                 09/02/22 06:48





                                 09/02/22 06:48


Labs: 


                  Abnormal Lab Results - Last 24 Hours (Table)











  09/02/22 09/02/22 Range/Units





  06:48 06:48 


 


WBC  14.9 H   (3.8-10.6)  k/uL


 


RBC  4.21 L   (4.30-5.90)  m/uL


 


Neutrophils #  13.5 H   (1.3-7.7)  k/uL


 


Lymphocytes #  0.5 L   (1.0-4.8)  k/uL


 


Potassium   3.4 L  (3.5-5.1)  mmol/L


 


Calcium   7.7 L  (8.4-10.2)  mg/dL


 


ALT   56 H  (4-49)  U/L


 


Total Protein   5.2 L  (6.3-8.2)  g/dL


 


Albumin   2.9 L  (3.5-5.0)  g/dL








                      Microbiology - Last 24 Hours (Table)











 08/31/22 14:43 Blood Culture - Preliminary





 Blood    No Growth after 24 hours


 


 08/31/22 14:43 Blood Culture - Preliminary





 Blood    No Growth after 24 hours

## 2022-09-03 LAB
ALBUMIN SERPL-MCNC: 3.1 G/DL (ref 3.8–4.9)
ALBUMIN/GLOB SERPL: 1.48 G/DL (ref 1.6–3.17)
ALP SERPL-CCNC: 68 U/L (ref 41–126)
ALT SERPL-CCNC: 48 U/L (ref 10–49)
ANION GAP SERPL CALC-SCNC: 13 MMOL/L (ref 10–18)
AST SERPL-CCNC: 25 U/L (ref 14–35)
BASOPHILS # BLD AUTO: 0.04 X 10*3/UL (ref 0–0.1)
BASOPHILS NFR BLD AUTO: 0.3 %
BUN SERPL-SCNC: 14.1 MG/DL (ref 9–27)
BUN/CREAT SERPL: 15.67 RATIO (ref 12–20)
CALCIUM SPEC-MCNC: 8.2 MG/DL (ref 8.7–10.3)
CHLORIDE SERPL-SCNC: 103 MMOL/L (ref 96–109)
CO2 SERPL-SCNC: 24 MMOL/L (ref 20–27.5)
EOSINOPHIL # BLD AUTO: 0.19 X 10*3/UL (ref 0.04–0.35)
EOSINOPHIL NFR BLD AUTO: 1.3 %
ERYTHROCYTE [DISTWIDTH] IN BLOOD BY AUTOMATED COUNT: 3.91 X 10*6/UL (ref 4.4–5.6)
ERYTHROCYTE [DISTWIDTH] IN BLOOD: 13.3 % (ref 11.5–14.5)
GLOBULIN SER CALC-MCNC: 2.1 G/DL (ref 1.6–3.3)
GLUCOSE SERPL-MCNC: 100 MG/DL (ref 70–110)
HCT VFR BLD AUTO: 36.1 % (ref 39.6–50)
HGB BLD-MCNC: 11.9 G/DL (ref 13–17)
IMM GRANULOCYTES BLD QL AUTO: 0.4 %
LYMPHOCYTES # SPEC AUTO: 0.65 X 10*3/UL (ref 0.9–5)
LYMPHOCYTES NFR SPEC AUTO: 4.5 %
MCH RBC QN AUTO: 30.4 PG (ref 27–32)
MCHC RBC AUTO-ENTMCNC: 33 G/DL (ref 32–37)
MCV RBC AUTO: 92.3 FL (ref 80–97)
MONOCYTES # BLD AUTO: 0.96 X 10*3/UL (ref 0.2–1)
MONOCYTES NFR BLD AUTO: 6.6 %
NEUTROPHILS # BLD AUTO: 12.6 X 10*3/UL (ref 1.8–7.7)
NEUTROPHILS NFR BLD AUTO: 86.9 %
NRBC BLD AUTO-RTO: 0 /100 WBCS (ref 0–0)
PLATELET # BLD AUTO: 239 X 10*3/UL (ref 140–440)
POTASSIUM SERPL-SCNC: 3.3 MMOL/L (ref 3.5–5.5)
PROT SERPL-MCNC: 5.2 G/DL (ref 6.2–8.2)
SODIUM SERPL-SCNC: 140 MMOL/L (ref 135–145)
WBC # BLD AUTO: 14.5 X 10*3/UL (ref 4.5–10)

## 2022-09-03 RX ADMIN — SIMETHICONE SCH MG: 20 SUSPENSION/ DROPS ORAL at 13:19

## 2022-09-03 RX ADMIN — KETOROLAC TROMETHAMINE SCH MG: 15 INJECTION, SOLUTION INTRAMUSCULAR; INTRAVENOUS at 11:31

## 2022-09-03 RX ADMIN — PIPERACILLIN AND TAZOBACTAM SCH MLS/HR: 3; .375 INJECTION, POWDER, FOR SOLUTION INTRAVENOUS at 15:25

## 2022-09-03 RX ADMIN — HYDROMORPHONE HYDROCHLORIDE PRN MG: 1 INJECTION, SOLUTION INTRAMUSCULAR; INTRAVENOUS; SUBCUTANEOUS at 15:25

## 2022-09-03 RX ADMIN — ACETAMINOPHEN SCH MLS/HR: 10 INJECTION, SOLUTION INTRAVENOUS at 13:18

## 2022-09-03 RX ADMIN — METRONIDAZOLE SCH MLS/HR: 500 INJECTION, SOLUTION INTRAVENOUS at 10:27

## 2022-09-03 RX ADMIN — ACETAMINOPHEN SCH MLS/HR: 10 INJECTION, SOLUTION INTRAVENOUS at 05:00

## 2022-09-03 RX ADMIN — KETOROLAC TROMETHAMINE SCH MG: 15 INJECTION, SOLUTION INTRAMUSCULAR; INTRAVENOUS at 17:38

## 2022-09-03 RX ADMIN — KETOROLAC TROMETHAMINE SCH MG: 15 INJECTION, SOLUTION INTRAMUSCULAR; INTRAVENOUS at 23:36

## 2022-09-03 RX ADMIN — HEPARIN SODIUM SCH UNIT: 5000 INJECTION INTRAVENOUS; SUBCUTANEOUS at 21:14

## 2022-09-03 RX ADMIN — METRONIDAZOLE SCH MLS/HR: 500 INJECTION, SOLUTION INTRAVENOUS at 16:15

## 2022-09-03 RX ADMIN — ONDANSETRON PRN MG: 2 INJECTION INTRAMUSCULAR; INTRAVENOUS at 21:09

## 2022-09-03 RX ADMIN — KETOROLAC TROMETHAMINE SCH MG: 15 INJECTION, SOLUTION INTRAMUSCULAR; INTRAVENOUS at 05:50

## 2022-09-03 RX ADMIN — HYDROMORPHONE HYDROCHLORIDE PRN MG: 1 INJECTION, SOLUTION INTRAMUSCULAR; INTRAVENOUS; SUBCUTANEOUS at 00:14

## 2022-09-03 RX ADMIN — HYDROMORPHONE HYDROCHLORIDE PRN MG: 1 INJECTION, SOLUTION INTRAMUSCULAR; INTRAVENOUS; SUBCUTANEOUS at 19:52

## 2022-09-03 RX ADMIN — HYDROMORPHONE HYDROCHLORIDE PRN MG: 1 INJECTION, SOLUTION INTRAMUSCULAR; INTRAVENOUS; SUBCUTANEOUS at 23:44

## 2022-09-03 RX ADMIN — PIPERACILLIN AND TAZOBACTAM SCH MLS/HR: 3; .375 INJECTION, POWDER, FOR SOLUTION INTRAVENOUS at 03:05

## 2022-09-03 RX ADMIN — CEFAZOLIN SCH MLS/HR: 330 INJECTION, POWDER, FOR SOLUTION INTRAMUSCULAR; INTRAVENOUS at 05:05

## 2022-09-03 RX ADMIN — HYDROMORPHONE HYDROCHLORIDE PRN MG: 1 INJECTION, SOLUTION INTRAMUSCULAR; INTRAVENOUS; SUBCUTANEOUS at 05:54

## 2022-09-03 RX ADMIN — HYDROMORPHONE HYDROCHLORIDE PRN MG: 1 INJECTION, SOLUTION INTRAMUSCULAR; INTRAVENOUS; SUBCUTANEOUS at 17:40

## 2022-09-03 RX ADMIN — HYDROMORPHONE HYDROCHLORIDE PRN MG: 1 INJECTION, SOLUTION INTRAMUSCULAR; INTRAVENOUS; SUBCUTANEOUS at 02:57

## 2022-09-03 RX ADMIN — METRONIDAZOLE SCH MLS/HR: 500 INJECTION, SOLUTION INTRAVENOUS at 03:06

## 2022-09-03 RX ADMIN — ACETAMINOPHEN SCH MLS/HR: 10 INJECTION, SOLUTION INTRAVENOUS at 17:39

## 2022-09-03 RX ADMIN — CEFAZOLIN SCH MLS/HR: 330 INJECTION, POWDER, FOR SOLUTION INTRAMUSCULAR; INTRAVENOUS at 13:20

## 2022-09-03 RX ADMIN — METRONIDAZOLE SCH MLS/HR: 500 INJECTION, SOLUTION INTRAVENOUS at 21:14

## 2022-09-03 RX ADMIN — HYDROMORPHONE HYDROCHLORIDE PRN MG: 1 INJECTION, SOLUTION INTRAMUSCULAR; INTRAVENOUS; SUBCUTANEOUS at 11:30

## 2022-09-03 RX ADMIN — SIMETHICONE SCH MG: 20 SUSPENSION/ DROPS ORAL at 08:23

## 2022-09-03 RX ADMIN — HEPARIN SODIUM SCH UNIT: 5000 INJECTION INTRAVENOUS; SUBCUTANEOUS at 08:21

## 2022-09-03 RX ADMIN — ONDANSETRON PRN MG: 2 INJECTION INTRAMUSCULAR; INTRAVENOUS at 08:29

## 2022-09-03 RX ADMIN — PIPERACILLIN AND TAZOBACTAM SCH MLS/HR: 3; .375 INJECTION, POWDER, FOR SOLUTION INTRAVENOUS at 21:13

## 2022-09-03 RX ADMIN — HYDROMORPHONE HYDROCHLORIDE PRN MG: 1 INJECTION, SOLUTION INTRAMUSCULAR; INTRAVENOUS; SUBCUTANEOUS at 21:32

## 2022-09-03 RX ADMIN — ONDANSETRON PRN MG: 2 INJECTION INTRAMUSCULAR; INTRAVENOUS at 15:32

## 2022-09-03 RX ADMIN — HYDROMORPHONE HYDROCHLORIDE PRN MG: 1 INJECTION, SOLUTION INTRAMUSCULAR; INTRAVENOUS; SUBCUTANEOUS at 08:19

## 2022-09-03 RX ADMIN — SIMETHICONE SCH MG: 20 SUSPENSION/ DROPS ORAL at 17:40

## 2022-09-03 RX ADMIN — PIPERACILLIN AND TAZOBACTAM SCH MLS/HR: 3; .375 INJECTION, POWDER, FOR SOLUTION INTRAVENOUS at 08:21

## 2022-09-03 RX ADMIN — SIMETHICONE SCH MG: 20 SUSPENSION/ DROPS ORAL at 21:17

## 2022-09-03 RX ADMIN — PANTOPRAZOLE SODIUM SCH MG: 40 INJECTION, POWDER, FOR SOLUTION INTRAVENOUS at 08:22

## 2022-09-03 NOTE — P.PN
Subjective


Progress Note Date: 09/03/22


Principal diagnosis: 





Diverticulitis





41-year-old male with perforated diverticulitis.  Patient says he is gradually 

improving daily.  Still having some abdominal pain and bloating but is better.  

He is tolerating clear liquids.  No nausea or vomiting.  No labs From today.  He

is afebrile with no tachycardia currently.  He is passing small volume of stool 

at times.





Objective





- Vital Signs


Vital signs: 


                                   Vital Signs











Temp  98.7 F   09/03/22 05:00


 


Pulse  90   09/03/22 08:37


 


Resp  18   09/03/22 08:00


 


BP  141/69   09/03/22 08:37


 


Pulse Ox  95   09/03/22 05:00


 


FiO2      








                                 Intake & Output











 09/02/22 09/03/22 09/03/22





 18:59 06:59 18:59


 


Intake Total 2140  


 


Balance 2140  


 


Intake:   


 


  Intake, IV Titration 1900  





  Amount   


 


    ACETAMINOPHEN IV (For   





    ) 1,000 mg In Empty Bag 1   





    bag @ 400 mls/hr IVPB   





    Q6H [facility] Rx#:[id number]   


 


    Piperacillin-Tazobactam 3 200  





    .375 gm In Sodium   





    Chloride 0.9% 100 ml @ 25   





    mls/hr IVPB Q6H [facility] Rx#:   





    [id number]   


 


    Sodium Chloride 0.9% 1, 1400  





    000 ml @ 130 mls/hr IV .   





    Q7H42M [facility] Rx#:[id number]   


 


    metroNIDAZOLE-NS  100  





    mg In Saline 1 100ml.bag   





    @ 100 mls/hr IVPB Q6H [facility]   





    Rx#:[id number]   


 


  Oral 240  


 


Other:   


 


  Voiding Method Toilet Toilet Toilet


 


  # Voids 5  


 


  # Bowel Movements 1  














- Exam





Abdomen: Soft, mild distention, mild diffuse tenderness, no rebound or guarding





- Labs


CBC & Chem 7: 


                                 09/02/22 06:48





                                 09/02/22 06:48


Labs: 


                      Microbiology - Last 24 Hours (Table)











 08/31/22 14:43 Blood Culture - Preliminary





 Blood    No Growth after 48 hours


 


 08/31/22 14:43 Blood Culture - Preliminary





 Blood    No Growth after 48 hours














Assessment and Plan


(1) Acute diverticulitis


Narrative/Plan: 


41-year-old male with acute diverticulitis.  So far patient continues to improve

with conservative management.  Patient high risk for failure however.  Continue 

clear liquids.  Recheck labs tomorrow.  Ambulate.  We'll follow closely.


Current Visit: Yes   Status: Acute   Code(s): K57.92 - DVTRCLI OF INTEST, PART 

UNSP, W/O PERF OR ABSCESS W/O BLEED   SNOMED Code(s): 801395956

## 2022-09-03 NOTE — P.PN
Subjective


Progress Note Date: 09/03/22





Patient is a 41-year-old male with no significant past medical history presents 

the ED for abdominal pain.  He was recently seen on 08/30/2022 for similar 

complaints.  CT abdomen and pelvis at that time demonstrated acute uncomplicated

sigmoid colon diverticulitis and patient was discharged home on Augmentin.  He 

presented back to the ED on 08/31/2022 for worsening abdominal pain.  Patient 

reports abdominal pain ongoing for the past 3 days.  Pain is generalized all 

over his abdomen.  Pain is colicky in nature.  Pain is 10 out of 10 in severity.

 Pain does not radiate.  Pain is associated with nausea and vomiting along with 

chills.  He denies any headache, lower extremity edema, cough, chest pain, 

shortness of breath, palpitations, changes in urination or bowel habits.  

Patient reports a decreased appetite.  He denies any lightheadedness, 

numbness/weakness/tingling of the extremities.  In the ER, he was tachycardic 

with heart rate of 96 and T-max of 100.4 Fahrenheit.  Vital signs otherwise st

able.  CBC showed leukocytosis of 19.1 with neutrophilia.  CMP showed sodium 134

glucose 108, total bilirubin of 2.6, AST of 79, ALT of 118.  Patient is admitted

for sepsis related to diverticulitis, failed outpatient therapy.





9/1


Nocturnist was called last night for worsening abdominal pain.  CT abdomen and 

pelvis was repeated which showed pneumoperitoneum.  Surgery was consulted and 

recommended conservative management, nothing by mouth, IV Tylenol and ibuprofen,

IV antibiotics.  Echocardiogram was done which showed EF of 55-60% with no 

regional wall motion abnormalities.  CBC shows improving leukocytosis of 16.4.  

INR 1.2.  Lactic acid negative.





9/2


Patient reports improvement in his abdominal pain. Had 2 bowel movements and is 

passing gas. Leukocytosis improved to 14.9. CMP shows K 3.4, Ca 7.7 and ALT 56.





9/3


Patient reports continued improvement in his abdominal pain.  One episode of 

nausea and vomiting.  Continuing to have bowel movements.  CBC and BMP pending 

at the time of this note.





General: non toxic, no distress, appears at stated age


Derm: warm, dry


Head: atraumatic, normocephalic, symmetric


Eyes: EOMI, no lid lag, anicteric sclera


Mouth: no lip lesion, mucus membranes moist


Cardiovascular: Tachycardic, no murmur


Lungs: CTA bilateral, no rhonchi, no rales , no accessory muscle use


Abdominal: soft, tenderness to palpation in all 4 quadrants without rebound 

tenderness, no guarding, no appreciable organomegaly


Ext: no gross muscle atrophy, no edema, no contractures


Neuro: no focal neuro deficits


Psych: Alert, oriented, appropriate affect 





#Sepsis


#Perforated sigmoid diverticulitis with pneumoperitoneum


#Transaminitis


#Morbid obesity





Patient currently meets sepsis criteria with tachycardia, leukocytosis and a 

positive source of infection.  CT AP positive for acute diverticulitis.  Patient

was started on Zosyn and Flagyl.  Scheduled Tylenol and Toradol IV for pain 

management.  Protonix 40 mg IV daily.  Blood cultures are negative so far.  

Lactic acid negative.  Patient be placed on telemetry monitoring.  Normal saline

at 130 mL per hour.  Clear liquid diet and advance as tolerated.  Surgery on 

board.  Infectious disease consulted.





Cardiology consulted for cardiac risk stratification.





Patient reports history of cholecystectomy.  Review of home medication shows 

that the patient is on testosterone supplementation.  





Patient will benefit from a structured weight loss program.





DVT prophylaxis: Heparin


Discussed with: Patient


Anticipated discharge: 2-3 days


Anticipated discharge place: Home





Patient names his wife decision maker if he can't make decisions for himself.  

Patient would like to be full code.





Patient will need long-term IV antibiotics on discharge.





Objective





- Vital Signs


Vital signs: 


                                   Vital Signs











Temp  98.7 F   09/03/22 05:00


 


Pulse  90   09/03/22 08:37


 


Resp  18   09/03/22 08:00


 


BP  141/69   09/03/22 08:37


 


Pulse Ox  95   09/03/22 05:00


 


FiO2      








                                 Intake & Output











 09/02/22 09/03/22 09/03/22





 18:59 06:59 18:59


 


Intake Total 2140  


 


Balance 2140  


 


Intake:   


 


  Intake, IV Titration 1900  





  Amount   


 


    ACETAMINOPHEN IV (For   





    ) 1,000 mg In Empty Bag 1   





    bag @ 400 mls/hr IVPB   





    Q6H [facility] Rx#:[id number]   


 


    Piperacillin-Tazobactam 3 200  





    .375 gm In Sodium   





    Chloride 0.9% 100 ml @ 25   





    mls/hr IVPB Q6H [facility] Rx#:   





    [id number]   


 


    Sodium Chloride 0.9% 1, 1400  





    000 ml @ 130 mls/hr IV .   





    Q7H42M [facility] Rx#:[id number]   


 


    metroNIDAZOLE-NS  100  





    mg In Saline 1 100ml.bag   





    @ 100 mls/hr IVPB [facility]   





    Rx#:[id number]   


 


  Oral 240  


 


Other:   


 


  Voiding Method Toilet Toilet Toilet


 


  # Voids 5  


 


  # Bowel Movements 1  














- Labs


CBC & Chem 7: 


                                 09/02/22 06:48





                                 09/02/22 06:48


Labs: 


                      Microbiology - Last 24 Hours (Table)











 08/31/22 14:43 Blood Culture - Preliminary





 Blood    No Growth after 48 hours


 


 08/31/22 14:43 Blood Culture - Preliminary





 Blood    No Growth after 48 hours

## 2022-09-04 LAB
ANION GAP SERPL CALC-SCNC: 9 MMOL/L
BASOPHILS # BLD AUTO: 0.05 X 10*3/UL (ref 0–0.1)
BASOPHILS NFR BLD AUTO: 0.4 %
BUN SERPL-SCNC: 14 MG/DL (ref 9–20)
CALCIUM SPEC-MCNC: 8 MG/DL (ref 8.4–10.2)
CHLORIDE SERPL-SCNC: 99 MMOL/L (ref 98–107)
CO2 SERPL-SCNC: 29 MMOL/L (ref 22–30)
EOSINOPHIL # BLD AUTO: 0.34 X 10*3/UL (ref 0.04–0.35)
EOSINOPHIL NFR BLD AUTO: 2.7 %
ERYTHROCYTE [DISTWIDTH] IN BLOOD BY AUTOMATED COUNT: 3.93 X 10*6/UL (ref 4.4–5.6)
ERYTHROCYTE [DISTWIDTH] IN BLOOD: 13.2 % (ref 11.5–14.5)
GLUCOSE SERPL-MCNC: 106 MG/DL (ref 74–99)
HCT VFR BLD AUTO: 36.1 % (ref 39.6–50)
HGB BLD-MCNC: 12.1 G/DL (ref 13–17)
IMM GRANULOCYTES BLD QL AUTO: 1 %
LYMPHOCYTES # SPEC AUTO: 0.99 X 10*3/UL (ref 0.9–5)
LYMPHOCYTES NFR SPEC AUTO: 7.8 %
MCH RBC QN AUTO: 30.8 PG (ref 27–32)
MCHC RBC AUTO-ENTMCNC: 33.5 G/DL (ref 32–37)
MCV RBC AUTO: 91.9 FL (ref 80–97)
MONOCYTES # BLD AUTO: 0.88 X 10*3/UL (ref 0.2–1)
MONOCYTES NFR BLD AUTO: 6.9 %
NEUTROPHILS # BLD AUTO: 10.3 X 10*3/UL (ref 1.8–7.7)
NEUTROPHILS NFR BLD AUTO: 81.2 %
NRBC BLD AUTO-RTO: 0 /100 WBCS (ref 0–0)
PLATELET # BLD AUTO: 255 X 10*3/UL (ref 140–440)
POTASSIUM SERPL-SCNC: 3.2 MMOL/L (ref 3.5–5.1)
SODIUM SERPL-SCNC: 137 MMOL/L (ref 137–145)
WBC # BLD AUTO: 12.69 X 10*3/UL (ref 4.5–10)

## 2022-09-04 RX ADMIN — HYDROMORPHONE HYDROCHLORIDE PRN MG: 1 INJECTION, SOLUTION INTRAMUSCULAR; INTRAVENOUS; SUBCUTANEOUS at 02:27

## 2022-09-04 RX ADMIN — PANTOPRAZOLE SODIUM SCH MG: 40 INJECTION, POWDER, FOR SOLUTION INTRAVENOUS at 08:12

## 2022-09-04 RX ADMIN — PIPERACILLIN AND TAZOBACTAM SCH MLS/HR: 3; .375 INJECTION, POWDER, FOR SOLUTION INTRAVENOUS at 14:23

## 2022-09-04 RX ADMIN — SIMETHICONE SCH MG: 20 SUSPENSION/ DROPS ORAL at 06:14

## 2022-09-04 RX ADMIN — ONDANSETRON PRN MG: 2 INJECTION INTRAMUSCULAR; INTRAVENOUS at 03:52

## 2022-09-04 RX ADMIN — HYDROMORPHONE HYDROCHLORIDE PRN MG: 1 INJECTION, SOLUTION INTRAMUSCULAR; INTRAVENOUS; SUBCUTANEOUS at 14:21

## 2022-09-04 RX ADMIN — PIPERACILLIN AND TAZOBACTAM SCH MLS/HR: 3; .375 INJECTION, POWDER, FOR SOLUTION INTRAVENOUS at 02:28

## 2022-09-04 RX ADMIN — METRONIDAZOLE SCH MLS/HR: 500 INJECTION, SOLUTION INTRAVENOUS at 22:14

## 2022-09-04 RX ADMIN — ONDANSETRON PRN MG: 2 INJECTION INTRAMUSCULAR; INTRAVENOUS at 10:37

## 2022-09-04 RX ADMIN — SIMETHICONE SCH MG: 20 SUSPENSION/ DROPS ORAL at 22:14

## 2022-09-04 RX ADMIN — HYDROMORPHONE HYDROCHLORIDE PRN MG: 1 INJECTION, SOLUTION INTRAMUSCULAR; INTRAVENOUS; SUBCUTANEOUS at 03:54

## 2022-09-04 RX ADMIN — PIPERACILLIN AND TAZOBACTAM SCH MLS/HR: 3; .375 INJECTION, POWDER, FOR SOLUTION INTRAVENOUS at 08:11

## 2022-09-04 RX ADMIN — CEFAZOLIN SCH MLS/HR: 330 INJECTION, POWDER, FOR SOLUTION INTRAMUSCULAR; INTRAVENOUS at 03:32

## 2022-09-04 RX ADMIN — HYDROMORPHONE HYDROCHLORIDE PRN MG: 1 INJECTION, SOLUTION INTRAMUSCULAR; INTRAVENOUS; SUBCUTANEOUS at 10:47

## 2022-09-04 RX ADMIN — PIPERACILLIN AND TAZOBACTAM SCH MLS/HR: 3; .375 INJECTION, POWDER, FOR SOLUTION INTRAVENOUS at 20:07

## 2022-09-04 RX ADMIN — KETOROLAC TROMETHAMINE PRN MG: 15 INJECTION, SOLUTION INTRAMUSCULAR; INTRAVENOUS at 23:43

## 2022-09-04 RX ADMIN — HYDROMORPHONE HYDROCHLORIDE PRN MG: 1 INJECTION, SOLUTION INTRAMUSCULAR; INTRAVENOUS; SUBCUTANEOUS at 20:45

## 2022-09-04 RX ADMIN — KETOROLAC TROMETHAMINE PRN MG: 15 INJECTION, SOLUTION INTRAMUSCULAR; INTRAVENOUS at 16:42

## 2022-09-04 RX ADMIN — CEFAZOLIN SCH: 330 INJECTION, POWDER, FOR SOLUTION INTRAMUSCULAR; INTRAVENOUS at 19:21

## 2022-09-04 RX ADMIN — METRONIDAZOLE SCH MLS/HR: 500 INJECTION, SOLUTION INTRAVENOUS at 16:32

## 2022-09-04 RX ADMIN — HYDROMORPHONE HYDROCHLORIDE PRN MG: 1 INJECTION, SOLUTION INTRAMUSCULAR; INTRAVENOUS; SUBCUTANEOUS at 08:12

## 2022-09-04 RX ADMIN — KETOROLAC TROMETHAMINE PRN MG: 15 INJECTION, SOLUTION INTRAMUSCULAR; INTRAVENOUS at 10:41

## 2022-09-04 RX ADMIN — HEPARIN SODIUM SCH UNIT: 5000 INJECTION INTRAVENOUS; SUBCUTANEOUS at 08:20

## 2022-09-04 RX ADMIN — METRONIDAZOLE SCH MLS/HR: 500 INJECTION, SOLUTION INTRAVENOUS at 03:54

## 2022-09-04 RX ADMIN — HYDROMORPHONE HYDROCHLORIDE PRN MG: 1 INJECTION, SOLUTION INTRAMUSCULAR; INTRAVENOUS; SUBCUTANEOUS at 06:13

## 2022-09-04 RX ADMIN — SIMETHICONE SCH MG: 20 SUSPENSION/ DROPS ORAL at 12:43

## 2022-09-04 RX ADMIN — HYDROMORPHONE HYDROCHLORIDE PRN MG: 1 INJECTION, SOLUTION INTRAMUSCULAR; INTRAVENOUS; SUBCUTANEOUS at 16:43

## 2022-09-04 RX ADMIN — METRONIDAZOLE SCH MLS/HR: 500 INJECTION, SOLUTION INTRAVENOUS at 10:05

## 2022-09-04 RX ADMIN — HEPARIN SODIUM SCH UNIT: 5000 INJECTION INTRAVENOUS; SUBCUTANEOUS at 20:08

## 2022-09-04 RX ADMIN — HYDROMORPHONE HYDROCHLORIDE PRN MG: 1 INJECTION, SOLUTION INTRAMUSCULAR; INTRAVENOUS; SUBCUTANEOUS at 23:45

## 2022-09-04 RX ADMIN — SIMETHICONE SCH: 20 SUSPENSION/ DROPS ORAL at 19:27

## 2022-09-04 NOTE — P.PN
Subjective


Progress Note Date: 09/04/22





Patient is a 41-year-old male with no significant past medical history presents 

the ED for abdominal pain.  He was recently seen on 08/30/2022 for similar 

complaints.  CT abdomen and pelvis at that time demonstrated acute uncomplicated

sigmoid colon diverticulitis and patient was discharged home on Augmentin.  He 

presented back to the ED on 08/31/2022 for worsening abdominal pain.  Patient 

reports abdominal pain ongoing for the past 3 days.  Pain is generalized all 

over his abdomen.  Pain is colicky in nature.  Pain is 10 out of 10 in severity.

 Pain does not radiate.  Pain is associated with nausea and vomiting along with 

chills.  He denies any headache, lower extremity edema, cough, chest pain, 

shortness of breath, palpitations, changes in urination or bowel habits.  

Patient reports a decreased appetite.  He denies any lightheadedness, 

numbness/weakness/tingling of the extremities.  In the ER, he was tachycardic 

with heart rate of 96 and T-max of 100.4 Fahrenheit.  Vital signs otherwise st

able.  CBC showed leukocytosis of 19.1 with neutrophilia.  CMP showed sodium 134

glucose 108, total bilirubin of 2.6, AST of 79, ALT of 118.  Patient is admitted

for sepsis related to diverticulitis, failed outpatient therapy.





9/1


Nocturnist was called last night for worsening abdominal pain.  CT abdomen and 

pelvis was repeated which showed pneumoperitoneum.  Surgery was consulted and 

recommended conservative management, nothing by mouth, IV Tylenol and ibuprofen,

IV antibiotics.  Echocardiogram was done which showed EF of 55-60% with no 

regional wall motion abnormalities.  CBC shows improving leukocytosis of 16.4.  

INR 1.2.  Lactic acid negative.





9/2


Patient reports improvement in his abdominal pain. Had 2 bowel movements and is 

passing gas. Leukocytosis improved to 14.9. CMP shows K 3.4, Ca 7.7 and ALT 56.





9/3


Patient reports continued improvement in his abdominal pain.  One episode of 

nausea and vomiting.  Continuing to have bowel movements.  CBC and BMP pending 

at the time of this note.





9/4


Patient reports no changes in his abdominal pain.  States that Toradol worked 

better than Dilaudid.  No nausea and vomiting today.  Leukocytosis improved to 

12.69.  Hemoglobin 12.1.  BMP shows potassium of 3.2 and calcium of 8.





General: non toxic, no distress, appears at stated age


Derm: warm, dry


Head: atraumatic, normocephalic, symmetric


Eyes: EOMI, no lid lag, anicteric sclera


Mouth: no lip lesion, mucus membranes moist


Cardiovascular: Tachycardic, no murmur


Lungs: CTA bilateral, no rhonchi, no rales , no accessory muscle use


Abdominal: soft, tenderness to palpation in all 4 quadrants without rebound 

tenderness, no guarding, no appreciable organomegaly


Ext: no gross muscle atrophy, no edema, no contractures


Neuro: no focal neuro deficits


Psych: Alert, oriented, appropriate affect 





#Sepsis


#Perforated sigmoid diverticulitis with pneumoperitoneum


#Hypokalemia


#Transaminitis


#Morbid obesity





Patient currently meets sepsis criteria with tachycardia, leukocytosis and a po

sitive source of infection.  CT AP positive for acute diverticulitis.  Patient 

is continued on Zosyn and Flagyl.  Scheduled Tylenol and Toradol IV, Dilaudid IV

as needed for pain management.  Protonix 40 mg IV daily.  Blood cultures are 

negative so far.  Lactic acid negative.  Patient be placed on telemetry 

monitoring.  IVF decreased to normal saline at 75 mL per hour.  Clear liquid 

diet and advance as tolerated.  Surgery on board.  Infectious disease consulted.





Cardiology consulted for cardiac risk stratification.





Replace potassium via protocol.  Repeat BMP tomorrow morning.





Patient reports history of cholecystectomy.  Review of home medication shows 

that the patient is on testosterone supplementation.  





Patient will benefit from a structured weight loss program.





DVT prophylaxis: Heparin


Discussed with: Patient


Anticipated discharge: 2-3 days


Anticipated discharge place: Home





Patient names his wife decision maker if he can't make decisions for himself.  

Patient would like to be full code.





Patient will need long-term IV antibiotics on discharge.





Objective





- Vital Signs


Vital signs: 


                                   Vital Signs











Temp  99.1 F   09/04/22 04:04


 


Pulse  91   09/04/22 04:04


 


Resp  20   09/04/22 04:04


 


BP  176/77   09/04/22 04:04


 


Pulse Ox  95   09/04/22 04:04


 


FiO2      








                                 Intake & Output











 09/03/22 09/04/22 09/04/22





 18:59 06:59 18:59


 


Intake Total 660 500 


 


Output Total 4  


 


Balance 656 500 


 


Intake:   


 


  Intake, IV Titration 300  





  Amount   


 


    Piperacillin-Tazobactam 3 100  





    .375 gm In Sodium   





    Chloride 0.9% 100 ml @ 25   





    mls/hr IVPB Q6H [facility] Rx#:   





    [id number]   


 


    Potassium Chloride 20 meq 100  





    In Water For Injection 1   





    100ml.bag @ 50 mls/hr   





    IVPB [facility]CE [facility] Rx#:   





    [id number]   


 


    metroNIDAZOLE-NS  100  





    mg In Saline 1 100ml.bag   





    @ 100 mls/hr IVPB Q6H [facility]   





    Rx#:[id number]   


 


  Oral 360 500 


 


Output:   


 


  Urine 4  


 


Other:   


 


  Voiding Method Toilet Toilet 


 


  # Voids  2 


 


  # Bowel Movements 2  














- Labs


CBC & Chem 7: 


                                 09/04/22 05:16





                                 09/04/22 05:16


Labs: 


                  Abnormal Lab Results - Last 24 Hours (Table)











  09/03/22 09/03/22 09/04/22 Range/Units





  06:54 06:54 05:16 


 


WBC  14.50 H   12.69 H  (4.50-10.00)  X 10*3/uL


 


RBC  3.91 L   3.93 L  (4.40-5.60)  X 10*6/uL


 


Hgb  11.9 L   12.1 L  (13.0-17.0)  g/dL


 


Hct  36.1 L   36.1 L  (39.6-50.0)  %


 


Immature Gran #  0.06 H   0.13 H  (0.00-0.04)  X 10*3/uL


 


Neutrophils #  12.60 H   10.30 H  (1.80-7.70)  X 10*3/uL


 


Lymphocytes #  0.65 L    (0.90-5.00)  X 10*3/uL


 


Potassium   3.3 L   (3.5-5.5)  mmol/L


 


Glucose     (74-99)  mg/dL


 


Calcium   8.2 L   (8.7-10.3)  mg/dL


 


Total Protein   5.2 L   (6.2-8.2)  g/dL


 


Albumin   3.1 L   (3.8-4.9)  g/dL


 


Albumin/Globulin Ratio   1.48 L   (1.60-3.17)  g/dL














  09/04/22 Range/Units





  05:16 


 


WBC   (4.50-10.00)  X 10*3/uL


 


RBC   (4.40-5.60)  X 10*6/uL


 


Hgb   (13.0-17.0)  g/dL


 


Hct   (39.6-50.0)  %


 


Immature Gran #   (0.00-0.04)  X 10*3/uL


 


Neutrophils #   (1.80-7.70)  X 10*3/uL


 


Lymphocytes #   (0.90-5.00)  X 10*3/uL


 


Potassium  3.2 L  (3.5-5.5)  mmol/L


 


Glucose  106 H  (74-99)  mg/dL


 


Calcium  8.0 L  (8.7-10.3)  mg/dL


 


Total Protein   (6.2-8.2)  g/dL


 


Albumin   (3.8-4.9)  g/dL


 


Albumin/Globulin Ratio   (1.60-3.17)  g/dL








                      Microbiology - Last 24 Hours (Table)











 08/31/22 14:43 Blood Culture - Preliminary





 Blood    No Growth after 72 hours


 


 08/31/22 14:43 Blood Culture - Preliminary





 Blood    No Growth after 72 hours

## 2022-09-04 NOTE — P.PN
Subjective


Progress Note Date: 09/04/22


Principal diagnosis: 


Acute complicated diverticulitis





Patient is a 41-year-old  male presented to the hospital with abdominal

pain with a recent diagnosis of diverticulitis failing outpatient oral Augmentin

therapy with repeat CAT scan shows perforation.  


 on today's evaluation that is 09/04/2022, the patient continues to be afebrile,

the patient abdominal pain has decreased in intensity the patient denies any 

nausea and vomiting and has been tolerating a clear liquid diet which the 

patient had been started on, the patient did have some loose stool, the patient 

denies any chest pain shortness of breath or cough








Objective





- Vital Signs


Vital signs: 


                                   Vital Signs











Temp  98.7 F   09/04/22 11:10


 


Pulse  87   09/04/22 11:10


 


Resp  18   09/04/22 11:10


 


BP  144/88   09/04/22 11:10


 


Pulse Ox  94 L  09/04/22 11:10


 


FiO2      








                                 Intake & Output











 09/03/22 09/04/22 09/04/22





 18:59 06:59 18:59


 


Intake Total 660 500 


 


Output Total 4  


 


Balance 656 500 


 


Intake:   


 


  Intake, IV Titration 300  





  Amount   


 


    Piperacillin-Tazobactam 3 100  





    .375 gm In Sodium   





    Chloride 0.9% 100 ml @ 25   





    mls/hr IVPB Q6H [facility] Rx#:   





    [id number]   


 


    Potassium Chloride 20 meq 100  





    In Water For Injection 1   





    100ml.bag @ 50 mls/hr   





    IVPB [facility] Rx#:   





    [id number]   


 


    metroNIDAZOLE-NS  100  





    mg In Saline 1 100ml.bag   





    @ 100 mls/hr IVPB Q6H [facility]   





    Rx#:[id number]   


 


  Oral 360 500 


 


Output:   


 


  Urine 4  


 


Other:   


 


  Voiding Method Toilet Toilet 


 


  # Voids  2 


 


  # Bowel Movements 2  














- Exam


GENERAL DESCRIPTION: Middle-aged male lying in bed in no distress





RESPIRATORY SYSTEM: Unlabored breathing , decreased breath sounds at bases





HEART: S1 S2 regular rate and rhythm ,





ABDOMEN: Soft , mild distention and tenderness





EXTREMITIES: No edema feet








- Labs


CBC & Chem 7: 


                                 09/04/22 05:16





                                 09/04/22 05:16


Labs: 


                  Abnormal Lab Results - Last 24 Hours (Table)











  09/04/22 09/04/22 Range/Units





  05:16 05:16 


 


WBC  12.69 H   (4.50-10.00)  X 10*3/uL


 


RBC  3.93 L   (4.40-5.60)  X 10*6/uL


 


Hgb  12.1 L   (13.0-17.0)  g/dL


 


Hct  36.1 L   (39.6-50.0)  %


 


Immature Gran #  0.13 H   (0.00-0.04)  X 10*3/uL


 


Neutrophils #  10.30 H   (1.80-7.70)  X 10*3/uL


 


Potassium   3.2 L  (3.5-5.1)  mmol/L


 


Glucose   106 H  (74-99)  mg/dL


 


Calcium   8.0 L  (8.4-10.2)  mg/dL








                      Microbiology - Last 24 Hours (Table)











 08/31/22 14:43 Blood Culture - Preliminary





 Blood    No Growth after 96 hours


 


 08/31/22 14:43 Blood Culture - Preliminary





 Blood    No Growth after 96 hours














Assessment and Plan


(1) Acute diverticulitis


Current Visit: Yes   Status: Acute   Code(s): K57.92 - DVTRCLI OF INTEST, PART 

UNSP, W/O PERF OR ABSCESS W/O BLEED   SNOMED Code(s): 367903308


   





(2) Failure of outpatient treatment


Current Visit: Yes   Status: Acute   Code(s): Z78.9 - OTHER SPECIFIED HEALTH 

STATUS   SNOMED Code(s): 714619232


   


Plan: 


1patient presented to hospital with sepsis in this patient did have a fever 

tachycardia elevated white count source is acute complicated diverticulitis with

perforation and will need to cover for the enteric gram-negative both aerobes 

and anaerobes.


2patient has shown clinically improvement and will  continue with Zosyn 3.375 g

every 8 hours and monitor clinical course closely


Time with Patient: Less than 30

## 2022-09-04 NOTE — P.PN
Subjective


Progress Note Date: 09/04/22


Principal diagnosis: 





Diverticulitis





Patient says he feels better today.  He did have slight increased discomfort 

when the Toradol was discontinued but that has been restarted.  T-max 99.1.  

White blood cell count down to 12.6.  His passing flatus.  Small stools.  

Tolerating clear liquids.





Objective





- Vital Signs


Vital signs: 


                                   Vital Signs











Temp  99.1 F   09/04/22 04:04


 


Pulse  91   09/04/22 04:04


 


Resp  20   09/04/22 04:04


 


BP  176/77   09/04/22 04:04


 


Pulse Ox  95   09/04/22 04:04


 


FiO2      








                                 Intake & Output











 09/03/22 09/04/22 09/04/22





 18:59 06:59 18:59


 


Intake Total 660 500 


 


Output Total 4  


 


Balance 656 500 


 


Intake:   


 


  Intake, IV Titration 300  





  Amount   


 


    Piperacillin-Tazobactam 3 100  





    .375 gm In Sodium   





    Chloride 0.9% 100 ml @ 25   





    mls/hr IVPB Q6H [facility] Rx#:   





    [id number]   


 


    Potassium Chloride 20 meq 100  





    In Water For Injection 1   





    100ml.bag @ 50 mls/hr   





    IVPB [facility]CE [facility] Rx#:   





    [id number]   


 


    metroNIDAZOLE-NS  100  





    mg In Saline 1 100ml.bag   





    @ 100 mls/hr IVPB Q6H [facility]   





    Rx#:[id number]   


 


  Oral 360 500 


 


Output:   


 


  Urine 4  


 


Other:   


 


  Voiding Method Toilet Toilet 


 


  # Voids  2 


 


  # Bowel Movements 2  














- Exam





Abdomen: Soft, mild distention, mild diffuse tenderness increased lower 

quadrants, no rebound or guarding





- Labs


CBC & Chem 7: 


                                 09/04/22 05:16





                                 09/04/22 05:16


Labs: 


                  Abnormal Lab Results - Last 24 Hours (Table)











  09/03/22 09/03/22 09/04/22 Range/Units





  06:54 06:54 05:16 


 


WBC  14.50 H   12.69 H  (4.50-10.00)  X 10*3/uL


 


RBC  3.91 L   3.93 L  (4.40-5.60)  X 10*6/uL


 


Hgb  11.9 L   12.1 L  (13.0-17.0)  g/dL


 


Hct  36.1 L   36.1 L  (39.6-50.0)  %


 


Immature Gran #  0.06 H   0.13 H  (0.00-0.04)  X 10*3/uL


 


Neutrophils #  12.60 H   10.30 H  (1.80-7.70)  X 10*3/uL


 


Lymphocytes #  0.65 L    (0.90-5.00)  X 10*3/uL


 


Potassium   3.3 L   (3.5-5.5)  mmol/L


 


Glucose     (74-99)  mg/dL


 


Calcium   8.2 L   (8.7-10.3)  mg/dL


 


Total Protein   5.2 L   (6.2-8.2)  g/dL


 


Albumin   3.1 L   (3.8-4.9)  g/dL


 


Albumin/Globulin Ratio   1.48 L   (1.60-3.17)  g/dL














  09/04/22 Range/Units





  05:16 


 


WBC   (4.50-10.00)  X 10*3/uL


 


RBC   (4.40-5.60)  X 10*6/uL


 


Hgb   (13.0-17.0)  g/dL


 


Hct   (39.6-50.0)  %


 


Immature Gran #   (0.00-0.04)  X 10*3/uL


 


Neutrophils #   (1.80-7.70)  X 10*3/uL


 


Lymphocytes #   (0.90-5.00)  X 10*3/uL


 


Potassium  3.2 L  (3.5-5.5)  mmol/L


 


Glucose  106 H  (74-99)  mg/dL


 


Calcium  8.0 L  (8.7-10.3)  mg/dL


 


Total Protein   (6.2-8.2)  g/dL


 


Albumin   (3.8-4.9)  g/dL


 


Albumin/Globulin Ratio   (1.60-3.17)  g/dL








                      Microbiology - Last 24 Hours (Table)











 08/31/22 14:43 Blood Culture - Preliminary





 Blood    No Growth after 72 hours


 


 08/31/22 14:43 Blood Culture - Preliminary





 Blood    No Growth after 72 hours














Assessment and Plan


(1) Acute diverticulitis


Narrative/Plan: 


Overall patient continues to slowly improve.  Continue broad-spectrum antibiotic

coverage.  Continue clear liquids for now.  Repeat labs tomorrow.  Ambulate.


Current Visit: Yes   Status: Acute   Code(s): K57.92 - DVTRCLI OF INTEST, PART 

UNSP, W/O PERF OR ABSCESS W/O BLEED   SNOMED Code(s): 557208095

## 2022-09-04 NOTE — P.PN
Subjective


Progress Note Date: 09/03/22


Principal diagnosis: 


Acute complicated diverticulitis





Patient is a 41-year-old  male presented to the hospital with abdominal

pain with a recent diagnosis of diverticulitis failing outpatient oral Augmentin

therapy with repeat CAT scan shows perforation.  


 on today's evaluation that is 09/03/2022, the patient remains to be afebrile, 

the patient abdominal pain has decreased in intensity the patient denies any 

nausea and vomiting, the patient did have some loose stool, the patient denies 

any chest pain shortness of breath or cough








Objective





- Vital Signs


Vital signs: 


                                   Vital Signs











Temp  98.7 F   09/03/22 05:00


 


Pulse  94   09/03/22 11:04


 


Resp  18   09/03/22 11:04


 


BP  151/94   09/03/22 11:04


 


Pulse Ox  93 L  09/03/22 11:04


 


FiO2      








                                 Intake & Output











 09/02/22 09/03/22 09/03/22





 18:59 06:59 18:59


 


Intake Total 2140  


 


Balance 2140  


 


Intake:   


 


  Intake, IV Titration 1900  





  Amount   


 


    ACETAMINOPHEN IV (For   





    ) 1,000 mg In Empty Bag 1   





    bag @ 400 mls/hr IVPB   





    Q6H [facility] Rx#:[id number]   


 


    Piperacillin-Tazobactam 3 200  





    .375 gm In Sodium   





    Chloride 0.9% 100 ml @ 25   





    mls/hr IVPB Q6H [facility] Rx#:   





    [id number]   


 


    Sodium Chloride 0.9% 1, 1400  





    000 ml @ 130 mls/hr IV .   





    [id number] [facility] Rx#:[id number]   


 


    metroNIDAZOLE-NS  100  





    mg In Saline 1 100ml.bag   





    @ 100 mls/hr IVPB Q6H [facility]   





    Rx#:[id number]   


 


  Oral 240  


 


Other:   


 


  Voiding Method Toilet Toilet Toilet


 


  # Voids 5  


 


  # Bowel Movements 1  














- Exam


GENERAL DESCRIPTION: Middle-aged male lying in bed in no distress





RESPIRATORY SYSTEM: Unlabored breathing , decreased breath sounds at bases





HEART: S1 S2 regular rate and rhythm ,





ABDOMEN: Soft , mild distention and tenderness





EXTREMITIES: No edema feet








- Labs


CBC & Chem 7: 


                                 09/04/22 05:16





                                 09/04/22 05:16


Labs: 


                  Abnormal Lab Results - Last 24 Hours (Table)











  09/03/22 09/03/22 Range/Units





  06:54 06:54 


 


WBC  14.50 H   (4.50-10.00)  X 10*3/uL


 


RBC  3.91 L   (4.40-5.60)  X 10*6/uL


 


Hgb  11.9 L   (13.0-17.0)  g/dL


 


Hct  36.1 L   (39.6-50.0)  %


 


Immature Gran #  0.06 H   (0.00-0.04)  X 10*3/uL


 


Neutrophils #  12.60 H   (1.80-7.70)  X 10*3/uL


 


Lymphocytes #  0.65 L   (0.90-5.00)  X 10*3/uL


 


Potassium   3.3 L  (3.5-5.5)  mmol/L


 


Calcium   8.2 L  (8.7-10.3)  mg/dL


 


Total Protein   5.2 L  (6.2-8.2)  g/dL


 


Albumin   3.1 L  (3.8-4.9)  g/dL


 


Albumin/Globulin Ratio   1.48 L  (1.60-3.17)  g/dL








                      Microbiology - Last 24 Hours (Table)











 08/31/22 14:43 Blood Culture - Preliminary





 Blood    No Growth after 48 hours


 


 08/31/22 14:43 Blood Culture - Preliminary





 Blood    No Growth after 48 hours














Assessment and Plan


(1) Acute diverticulitis


Current Visit: Yes   Status: Acute   Code(s): K57.92 - DVTRCLI OF INTEST, PART 

UNSP, W/O PERF OR ABSCESS W/O BLEED   SNOMED Code(s): 704720180


   





(2) Failure of outpatient treatment


Current Visit: Yes   Status: Acute   Code(s): Z78.9 - OTHER SPECIFIED HEALTH 

STATUS   SNOMED Code(s): 687189425


   


Plan: 


1patient presented to hospital with sepsis in this patient did have a fever 

tachycardia elevated white count source is acute complicated diverticulitis with

perforation and will need to cover for the enteric gram-negative both aerobes 

and anaerobes.


2patient to continue with Zosyn 3.375 g every 8 hours along with bowel rest 

white count is trending down





Time with Patient: Less than 30

## 2022-09-05 LAB
ANION GAP SERPL CALC-SCNC: 10 MMOL/L
BASOPHILS # BLD AUTO: 0.09 X 10*3/UL (ref 0–0.1)
BASOPHILS NFR BLD AUTO: 0.6 %
BUN SERPL-SCNC: 12 MG/DL (ref 9–20)
CALCIUM SPEC-MCNC: 8 MG/DL (ref 8.4–10.2)
CHLORIDE SERPL-SCNC: 95 MMOL/L (ref 98–107)
CO2 SERPL-SCNC: 30 MMOL/L (ref 22–30)
EOSINOPHIL # BLD AUTO: 0.35 X 10*3/UL (ref 0.04–0.35)
EOSINOPHIL NFR BLD AUTO: 2.5 %
ERYTHROCYTE [DISTWIDTH] IN BLOOD BY AUTOMATED COUNT: 4.16 X 10*6/UL (ref 4.4–5.6)
ERYTHROCYTE [DISTWIDTH] IN BLOOD: 13.1 % (ref 11.5–14.5)
GLUCOSE SERPL-MCNC: 90 MG/DL (ref 74–99)
HCT VFR BLD AUTO: 38.4 % (ref 39.6–50)
HGB BLD-MCNC: 12.7 G/DL (ref 13–17)
IMM GRANULOCYTES BLD QL AUTO: 2.4 %
LYMPHOCYTES # SPEC AUTO: 1.89 X 10*3/UL (ref 0.9–5)
LYMPHOCYTES NFR SPEC AUTO: 13.6 %
MCH RBC QN AUTO: 30.5 PG (ref 27–32)
MCHC RBC AUTO-ENTMCNC: 33.1 G/DL (ref 32–37)
MCV RBC AUTO: 92.3 FL (ref 80–97)
MONOCYTES # BLD AUTO: 1.07 X 10*3/UL (ref 0.2–1)
MONOCYTES NFR BLD AUTO: 7.7 %
NEUTROPHILS # BLD AUTO: 10.2 X 10*3/UL (ref 1.8–7.7)
NEUTROPHILS NFR BLD AUTO: 73.2 %
NRBC BLD AUTO-RTO: 0 /100 WBCS (ref 0–0)
PLATELET # BLD AUTO: 299 X 10*3/UL (ref 140–440)
POTASSIUM SERPL-SCNC: 3.3 MMOL/L (ref 3.5–5.1)
RBC MORPH BLD: NORMAL
SODIUM SERPL-SCNC: 135 MMOL/L (ref 137–145)
WBC # BLD AUTO: 13.94 X 10*3/UL (ref 4.5–10)

## 2022-09-05 RX ADMIN — CEFAZOLIN SCH: 330 INJECTION, POWDER, FOR SOLUTION INTRAMUSCULAR; INTRAVENOUS at 19:02

## 2022-09-05 RX ADMIN — HEPARIN SODIUM SCH UNIT: 5000 INJECTION INTRAVENOUS; SUBCUTANEOUS at 21:08

## 2022-09-05 RX ADMIN — PIPERACILLIN AND TAZOBACTAM SCH MLS/HR: 3; .375 INJECTION, POWDER, FOR SOLUTION INTRAVENOUS at 08:18

## 2022-09-05 RX ADMIN — METRONIDAZOLE SCH MLS/HR: 500 INJECTION, SOLUTION INTRAVENOUS at 22:50

## 2022-09-05 RX ADMIN — SIMETHICONE SCH MG: 20 SUSPENSION/ DROPS ORAL at 13:03

## 2022-09-05 RX ADMIN — KETOROLAC TROMETHAMINE PRN MG: 15 INJECTION, SOLUTION INTRAMUSCULAR; INTRAVENOUS at 13:01

## 2022-09-05 RX ADMIN — HYDROMORPHONE HYDROCHLORIDE PRN MG: 1 INJECTION, SOLUTION INTRAMUSCULAR; INTRAVENOUS; SUBCUTANEOUS at 19:15

## 2022-09-05 RX ADMIN — HYDROMORPHONE HYDROCHLORIDE PRN MG: 1 INJECTION, SOLUTION INTRAMUSCULAR; INTRAVENOUS; SUBCUTANEOUS at 07:00

## 2022-09-05 RX ADMIN — SIMETHICONE SCH MG: 20 SUSPENSION/ DROPS ORAL at 05:21

## 2022-09-05 RX ADMIN — HYDROMORPHONE HYDROCHLORIDE PRN MG: 1 INJECTION, SOLUTION INTRAMUSCULAR; INTRAVENOUS; SUBCUTANEOUS at 10:44

## 2022-09-05 RX ADMIN — METRONIDAZOLE SCH MLS/HR: 500 INJECTION, SOLUTION INTRAVENOUS at 09:48

## 2022-09-05 RX ADMIN — PANTOPRAZOLE SODIUM SCH MG: 40 INJECTION, POWDER, FOR SOLUTION INTRAVENOUS at 08:18

## 2022-09-05 RX ADMIN — SIMETHICONE SCH MG: 20 SUSPENSION/ DROPS ORAL at 22:50

## 2022-09-05 RX ADMIN — METRONIDAZOLE SCH MLS/HR: 500 INJECTION, SOLUTION INTRAVENOUS at 04:20

## 2022-09-05 RX ADMIN — KETOROLAC TROMETHAMINE PRN MG: 15 INJECTION, SOLUTION INTRAMUSCULAR; INTRAVENOUS at 17:57

## 2022-09-05 RX ADMIN — HYDROMORPHONE HYDROCHLORIDE PRN MG: 1 INJECTION, SOLUTION INTRAMUSCULAR; INTRAVENOUS; SUBCUTANEOUS at 15:03

## 2022-09-05 RX ADMIN — CEFAZOLIN SCH MLS/HR: 330 INJECTION, POWDER, FOR SOLUTION INTRAMUSCULAR; INTRAVENOUS at 09:49

## 2022-09-05 RX ADMIN — METRONIDAZOLE SCH MLS/HR: 500 INJECTION, SOLUTION INTRAVENOUS at 15:06

## 2022-09-05 RX ADMIN — ONDANSETRON PRN MG: 2 INJECTION INTRAMUSCULAR; INTRAVENOUS at 05:20

## 2022-09-05 RX ADMIN — PIPERACILLIN AND TAZOBACTAM SCH MLS/HR: 3; .375 INJECTION, POWDER, FOR SOLUTION INTRAVENOUS at 01:37

## 2022-09-05 RX ADMIN — HYDROMORPHONE HYDROCHLORIDE PRN MG: 1 INJECTION, SOLUTION INTRAMUSCULAR; INTRAVENOUS; SUBCUTANEOUS at 22:49

## 2022-09-05 RX ADMIN — SIMETHICONE SCH MG: 20 SUSPENSION/ DROPS ORAL at 17:56

## 2022-09-05 RX ADMIN — HYDROMORPHONE HYDROCHLORIDE PRN MG: 1 INJECTION, SOLUTION INTRAMUSCULAR; INTRAVENOUS; SUBCUTANEOUS at 04:19

## 2022-09-05 RX ADMIN — PIPERACILLIN AND TAZOBACTAM SCH MLS/HR: 3; .375 INJECTION, POWDER, FOR SOLUTION INTRAVENOUS at 15:04

## 2022-09-05 RX ADMIN — HYDROMORPHONE HYDROCHLORIDE PRN MG: 1 INJECTION, SOLUTION INTRAMUSCULAR; INTRAVENOUS; SUBCUTANEOUS at 01:36

## 2022-09-05 RX ADMIN — KETOROLAC TROMETHAMINE PRN MG: 15 INJECTION, SOLUTION INTRAMUSCULAR; INTRAVENOUS at 05:25

## 2022-09-05 RX ADMIN — PIPERACILLIN AND TAZOBACTAM SCH MLS/HR: 3; .375 INJECTION, POWDER, FOR SOLUTION INTRAVENOUS at 21:08

## 2022-09-05 RX ADMIN — HEPARIN SODIUM SCH UNIT: 5000 INJECTION INTRAVENOUS; SUBCUTANEOUS at 08:18

## 2022-09-05 NOTE — P.PN
Subjective


Progress Note Date: 09/05/22


Principal diagnosis: 





Diverticulitis





Patient says he feels better today than yesterday.  T-max 99.  White blood cell 

count did increase somewhat.  He is tolerating clear liquids.  Continues to have

flatus and small bowel movements.





Objective





- Vital Signs


Vital signs: 


                                   Vital Signs











Temp  99.0 F   09/05/22 04:11


 


Pulse  79   09/05/22 04:11


 


Resp  20   09/05/22 04:11


 


BP  145/89   09/05/22 04:11


 


Pulse Ox  95   09/05/22 04:11


 


FiO2      








                                 Intake & Output











 09/04/22 09/05/22 09/05/22





 18:59 06:59 18:59


 


Intake Total 1200 1600 


 


Balance 1200 1600 


 


Intake:   


 


  IV 1200 1000 


 


    Piperacillin-Tazobactam 3 100 200 





    .375 gm In Sodium   





    Chloride 0.9% 100 ml @ 25   





    mls/hr IVPB Q6H [facility] Rx#:   





    [id number]   


 


    Sodium Chloride 0.9% 1, 900 600 





    000 ml @ 75 mls/hr IV .   





    [id number] [facility] Rx#:[id number]   


 


    metroNIDAZOLE-NS  200 200 





    mg In Saline 1 100ml.bag   





    @ 100 mls/hr IVPB Q6H [facility]   





    Rx#:[id number]   


 


  Oral  600 


 


Other:   


 


  Voiding Method   Toilet


 


  # Voids  3 


 


  # Bowel Movements  1 














- Exam





Abdomen: Soft, nondistended, mild diffuse tenderness





- Labs


CBC & Chem 7: 


                                 09/05/22 05:18





                                 09/04/22 05:16


Labs: 


                  Abnormal Lab Results - Last 24 Hours (Table)











  09/05/22 Range/Units





  05:18 


 


WBC  13.94 H  (4.50-10.00)  X 10*3/uL


 


RBC  4.16 L  (4.40-5.60)  X 10*6/uL


 


Hgb  12.7 L  (13.0-17.0)  g/dL


 


Hct  38.4 L  (39.6-50.0)  %








                      Microbiology - Last 24 Hours (Table)











 08/31/22 14:43 Blood Culture - Preliminary





 Blood    No Growth after 96 hours


 


 08/31/22 14:43 Blood Culture - Preliminary





 Blood    No Growth after 96 hours














Assessment and Plan


(1) Acute diverticulitis


Narrative/Plan: 


Patient continues to improve clinically.  White blood cell count did increase 

slightly.  We'll repeat CBC tomorrow.  Advance diet to full liquids.  Continue 

antibiotics.


Current Visit: Yes   Status: Acute   Code(s): K57.92 - DVTRCLI OF INTEST, PART 

UNSP, W/O PERF OR ABSCESS W/O BLEED   SNOMED Code(s): 896648888

## 2022-09-05 NOTE — P.PN
Subjective


Progress Note Date: 09/05/22





Patient is a 41-year-old male with no significant past medical history presents 

the ED for abdominal pain.  He was recently seen on 08/30/2022 for similar 

complaints.  CT abdomen and pelvis at that time demonstrated acute uncomplicated

sigmoid colon diverticulitis and patient was discharged home on Augmentin.  He 

presented back to the ED on 08/31/2022 for worsening abdominal pain.  Patient 

reports abdominal pain ongoing for the past 3 days.  Pain is generalized all 

over his abdomen.  Pain is colicky in nature.  Pain is 10 out of 10 in severity.

 Pain does not radiate.  Pain is associated with nausea and vomiting along with 

chills.  He denies any headache, lower extremity edema, cough, chest pain, 

shortness of breath, palpitations, changes in urination or bowel habits.  

Patient reports a decreased appetite.  He denies any lightheadedness, 

numbness/weakness/tingling of the extremities.  In the ER, he was tachycardic 

with heart rate of 96 and T-max of 100.4 Fahrenheit.  Vital signs otherwise st

able.  CBC showed leukocytosis of 19.1 with neutrophilia.  CMP showed sodium 134

glucose 108, total bilirubin of 2.6, AST of 79, ALT of 118.  Patient is admitted

for sepsis related to diverticulitis, failed outpatient therapy.





9/1


Nocturnist was called last night for worsening abdominal pain.  CT abdomen and 

pelvis was repeated which showed pneumoperitoneum.  Surgery was consulted and 

recommended conservative management, nothing by mouth, IV Tylenol and ibuprofen,

IV antibiotics.  Echocardiogram was done which showed EF of 55-60% with no 

regional wall motion abnormalities.  CBC shows improving leukocytosis of 16.4.  

INR 1.2.  Lactic acid negative.





9/2


Patient reports improvement in his abdominal pain. Had 2 bowel movements and is 

passing gas. Leukocytosis improved to 14.9. CMP shows K 3.4, Ca 7.7 and ALT 56.





9/3


Patient reports continued improvement in his abdominal pain.  One episode of 

nausea and vomiting.  Continuing to have bowel movements.  CBC and BMP pending 

at the time of this note.





9/4


Patient reports no changes in his abdominal pain.  States that Toradol worked 

better than Dilaudid.  No nausea and vomiting today.  Leukocytosis improved to 

12.69.  Hemoglobin 12.1.  BMP shows potassium of 3.2 and calcium of 8.





9/5


Patient reports no changes in his clinical condition.  Pain well-controlled with

current pain regimen.  He reported having a bowel movement today.  No nausea and

vomiting.  CBC shows leukocytosis of 13.94 and hemoglobin of 12.7.  BMP and 

magnesium is pending this morning.





General: non toxic, no distress, appears at stated age


Derm: warm, dry


Head: atraumatic, normocephalic, symmetric


Eyes: EOMI, no lid lag, anicteric sclera


Mouth: no lip lesion, mucus membranes moist


Cardiovascular: Tachycardic, no murmur


Lungs: CTA bilateral, no rhonchi, no rales , no accessory muscle use


Abdominal: soft, tenderness to palpation in all 4 quadrants without rebound 

tenderness, no guarding, no appreciable organomegaly


Ext: no gross muscle atrophy, no edema, no contractures


Neuro: no focal neuro deficits


Psych: Alert, oriented, appropriate affect 





#Sepsis


#Perforated sigmoid diverticulitis with pneumoperitoneum


#Hypokalemia


#Transaminitis


#Morbid obesity





Patient currently meets sepsis criteria with tachycardia, leukocytosis and a 

positive source of infection.  CT AP positive for acute diverticulitis.  Patient

is continued on Zosyn and Flagyl.  Scheduled Tylenol and Toradol IV, Dilaudid IV

as needed for pain management.  Protonix 40 mg IV daily.  Blood cultures are 

negative so far.  Lactic acid negative.  Patient be placed on telemetry 

monitoring.  IVF decreased to normal saline at 75 mL per hour.  Full liquid diet

and advance as tolerated.  Surgery on board.  Infectious disease consulted.





Cardiology consulted for cardiac risk stratification.





Replace potassium via protocol.  Repeat BMP tomorrow morning.





Patient reports history of cholecystectomy.  Review of home medication shows 

that the patient is on testosterone supplementation.  





Patient will benefit from a structured weight loss program.





DVT prophylaxis: Heparin


Discussed with: Patient


Anticipated discharge: 1-2 days


Anticipated discharge place: Home





Patient names his wife decision maker if he can't make decisions for himself.  

Patient would like to be full code.





Patient will need long-term IV antibiotics on discharge.





Objective





- Vital Signs


Vital signs: 


                                   Vital Signs











Temp  99.0 F   09/05/22 04:11


 


Pulse  79   09/05/22 04:11


 


Resp  20   09/05/22 04:11


 


BP  145/89   09/05/22 04:11


 


Pulse Ox  95   09/05/22 04:11


 


FiO2      








                                 Intake & Output











 09/04/22 09/05/22 09/05/22





 18:59 06:59 18:59


 


Intake Total 1200 1600 


 


Balance 1200 1600 


 


Intake:   


 


  IV 1200 1000 


 


    Piperacillin-Tazobactam 3 100 200 





    .375 gm In Sodium   





    Chloride 0.9% 100 ml @ 25   





    mls/hr IVPB Q6H [facility] Rx#:   





    [id number]   


 


    Sodium Chloride 0.9% 1, 900 600 





    000 ml @ 75 mls/hr IV .   





    [id number] [facility] Rx#:[id number]   


 


    metroNIDAZOLE-NS  200 200 





    mg In Saline 1 100ml.bag   





    @ 100 mls/hr IVPB Q6H [facility]   





    Rx#:[id number]   


 


  Oral  600 


 


Other:   


 


  Voiding Method   Toilet


 


  # Voids  3 


 


  # Bowel Movements  1 














- Labs


CBC & Chem 7: 


                                 09/05/22 05:18





                                 09/04/22 05:16


Labs: 


                  Abnormal Lab Results - Last 24 Hours (Table)











  09/05/22 Range/Units





  05:18 


 


WBC  13.94 H  (4.50-10.00)  X 10*3/uL


 


RBC  4.16 L  (4.40-5.60)  X 10*6/uL


 


Hgb  12.7 L  (13.0-17.0)  g/dL


 


Hct  38.4 L  (39.6-50.0)  %


 


Immature Gran #  0.34 H  (0.00-0.04)  X 10*3/uL


 


Neutrophils #  10.20 H  (1.80-7.70)  X 10*3/uL


 


Monocytes #  1.07 H  (0.20-1.00)  X 10*3/uL








                      Microbiology - Last 24 Hours (Table)











 08/31/22 14:43 Blood Culture - Preliminary





 Blood    No Growth after 96 hours


 


 08/31/22 14:43 Blood Culture - Preliminary





 Blood    No Growth after 96 hours

## 2022-09-06 LAB
BASOPHILS # BLD MANUAL: 0.13 X 10*3/UL (ref 0–0.1)
EOSINOPHIL # BLD MANUAL: 0.38 X 10*3/UL (ref 0.04–0.35)
ERYTHROCYTE [DISTWIDTH] IN BLOOD BY AUTOMATED COUNT: 4.2 X 10*6/UL (ref 4.4–5.6)
ERYTHROCYTE [DISTWIDTH] IN BLOOD: 13.1 % (ref 11.5–14.5)
HCT VFR BLD AUTO: 38.1 % (ref 39.6–50)
HGB BLD-MCNC: 12.8 G/DL (ref 13–17)
LYMPHOCYTES # BLD MANUAL: 1.27 X 10*3/UL (ref 0.9–5)
MCH RBC QN AUTO: 30.5 PG (ref 27–32)
MCHC RBC AUTO-ENTMCNC: 33.6 G/DL (ref 32–37)
MCV RBC AUTO: 90.7 FL (ref 80–97)
MONOCYTES # BLD MANUAL: 0 X 10*3/UL (ref 0.2–1)
NEUTROPHILS NFR BLD MANUAL: 86 %
NEUTS SEG # BLD MANUAL: 10.96 X 10*3/UL (ref 2–8.9)
NRBC BLD AUTO-RTO: 0.2 /100 WBCS (ref 0–0)
PLATELET # BLD AUTO: 323 X 10*3/UL (ref 140–440)
RBC MORPH BLD: NORMAL
WBC # BLD AUTO: 12.74 X 10*3/UL (ref 4.5–10)

## 2022-09-06 RX ADMIN — HEPARIN SODIUM SCH UNIT: 5000 INJECTION INTRAVENOUS; SUBCUTANEOUS at 22:33

## 2022-09-06 RX ADMIN — PIPERACILLIN AND TAZOBACTAM SCH MLS/HR: 3; .375 INJECTION, POWDER, FOR SOLUTION INTRAVENOUS at 22:32

## 2022-09-06 RX ADMIN — SIMETHICONE SCH MG: 20 SUSPENSION/ DROPS ORAL at 22:38

## 2022-09-06 RX ADMIN — HYDROMORPHONE HYDROCHLORIDE PRN MG: 1 INJECTION, SOLUTION INTRAMUSCULAR; INTRAVENOUS; SUBCUTANEOUS at 11:39

## 2022-09-06 RX ADMIN — HYDROMORPHONE HYDROCHLORIDE PRN MG: 1 INJECTION, SOLUTION INTRAMUSCULAR; INTRAVENOUS; SUBCUTANEOUS at 02:35

## 2022-09-06 RX ADMIN — PIPERACILLIN AND TAZOBACTAM SCH MLS/HR: 3; .375 INJECTION, POWDER, FOR SOLUTION INTRAVENOUS at 02:19

## 2022-09-06 RX ADMIN — SIMETHICONE SCH: 20 SUSPENSION/ DROPS ORAL at 21:29

## 2022-09-06 RX ADMIN — METRONIDAZOLE SCH MLS/HR: 500 INJECTION, SOLUTION INTRAVENOUS at 04:11

## 2022-09-06 RX ADMIN — HYDROMORPHONE HYDROCHLORIDE PRN MG: 1 INJECTION, SOLUTION INTRAMUSCULAR; INTRAVENOUS; SUBCUTANEOUS at 08:58

## 2022-09-06 RX ADMIN — PIPERACILLIN AND TAZOBACTAM SCH MLS/HR: 3; .375 INJECTION, POWDER, FOR SOLUTION INTRAVENOUS at 07:59

## 2022-09-06 RX ADMIN — PANTOPRAZOLE SODIUM SCH MG: 40 INJECTION, POWDER, FOR SOLUTION INTRAVENOUS at 07:57

## 2022-09-06 RX ADMIN — HYDROMORPHONE HYDROCHLORIDE PRN MG: 1 INJECTION, SOLUTION INTRAMUSCULAR; INTRAVENOUS; SUBCUTANEOUS at 20:00

## 2022-09-06 RX ADMIN — METRONIDAZOLE SCH MLS/HR: 500 INJECTION, SOLUTION INTRAVENOUS at 22:31

## 2022-09-06 RX ADMIN — HYDROMORPHONE HYDROCHLORIDE PRN MG: 1 INJECTION, SOLUTION INTRAMUSCULAR; INTRAVENOUS; SUBCUTANEOUS at 23:48

## 2022-09-06 RX ADMIN — PIPERACILLIN AND TAZOBACTAM SCH: 3; .375 INJECTION, POWDER, FOR SOLUTION INTRAVENOUS at 21:29

## 2022-09-06 RX ADMIN — CEFAZOLIN SCH MLS/HR: 330 INJECTION, POWDER, FOR SOLUTION INTRAMUSCULAR; INTRAVENOUS at 22:33

## 2022-09-06 RX ADMIN — KETOROLAC TROMETHAMINE PRN MG: 15 INJECTION, SOLUTION INTRAMUSCULAR; INTRAVENOUS at 12:46

## 2022-09-06 RX ADMIN — KETOROLAC TROMETHAMINE PRN MG: 15 INJECTION, SOLUTION INTRAMUSCULAR; INTRAVENOUS at 00:10

## 2022-09-06 RX ADMIN — HYDROMORPHONE HYDROCHLORIDE PRN MG: 1 INJECTION, SOLUTION INTRAMUSCULAR; INTRAVENOUS; SUBCUTANEOUS at 21:49

## 2022-09-06 RX ADMIN — CEFAZOLIN SCH MLS/HR: 330 INJECTION, POWDER, FOR SOLUTION INTRAMUSCULAR; INTRAVENOUS at 08:00

## 2022-09-06 RX ADMIN — SIMETHICONE SCH MG: 20 SUSPENSION/ DROPS ORAL at 20:01

## 2022-09-06 RX ADMIN — KETOROLAC TROMETHAMINE PRN MG: 15 INJECTION, SOLUTION INTRAMUSCULAR; INTRAVENOUS at 19:59

## 2022-09-06 RX ADMIN — METRONIDAZOLE SCH MLS/HR: 500 INJECTION, SOLUTION INTRAVENOUS at 11:40

## 2022-09-06 RX ADMIN — ONDANSETRON PRN MG: 2 INJECTION INTRAMUSCULAR; INTRAVENOUS at 19:58

## 2022-09-06 RX ADMIN — METRONIDAZOLE SCH: 500 INJECTION, SOLUTION INTRAVENOUS at 21:29

## 2022-09-06 RX ADMIN — SIMETHICONE SCH MG: 20 SUSPENSION/ DROPS ORAL at 08:59

## 2022-09-06 RX ADMIN — KETOROLAC TROMETHAMINE PRN MG: 15 INJECTION, SOLUTION INTRAMUSCULAR; INTRAVENOUS at 06:16

## 2022-09-06 RX ADMIN — HEPARIN SODIUM SCH UNIT: 5000 INJECTION INTRAVENOUS; SUBCUTANEOUS at 07:57

## 2022-09-06 RX ADMIN — HYDROMORPHONE HYDROCHLORIDE PRN MG: 1 INJECTION, SOLUTION INTRAMUSCULAR; INTRAVENOUS; SUBCUTANEOUS at 04:25

## 2022-09-06 NOTE — P.PN
Subjective


Progress Note Date: 09/05/22


Principal diagnosis: 


Acute complicated diverticulitis





Patient is a 41-year-old  male presented to the hospital with abdominal

pain with a recent diagnosis of diverticulitis failing outpatient oral Augmentin

therapy with repeat CAT scan shows perforation.  


 on today's evaluation that is 09/05/2022, the patient remains to be afebrile, 

the patient abdominal pain has decreased in intensity the patient denies any 

nausea and vomiting and has been tolerating a clear liquid diet , the patient 

denies any chest pain shortness of breath or cough








Objective





- Vital Signs


Vital signs: 


                                   Vital Signs











Temp  98.6 F   09/05/22 17:32


 


Pulse  76   09/05/22 17:32


 


Resp  16   09/05/22 17:32


 


BP  148/91   09/05/22 17:32


 


Pulse Ox  96   09/05/22 17:32


 


FiO2      








                                 Intake & Output











 09/05/22 09/05/22 09/06/22





 06:59 18:59 06:59


 


Intake Total 1600 312 


 


Balance 1600 312 


 


Intake:   


 


  IV 1000 312 


 


    Piperacillin-Tazobactam 3 200 100 





    .375 gm In Sodium   





    Chloride 0.9% 100 ml @ 25   





    mls/hr IVPB Q6H [facility] Rx#:   





    [id number]   


 


    Sodium Chloride 0.9% 1, 600 12 





    000 ml @ 75 mls/hr IV .   





    [id number] [facility] Rx#:[id number]   


 


    metroNIDAZOLE-NS  200 200 





    mg In Saline 1 100ml.bag   





    @ 100 mls/hr IVPB Q6H [facility]   





    Rx#:[id number]   


 


  Oral 600  


 


Other:   


 


  Voiding Method  Toilet 


 


  # Voids 3  


 


  # Bowel Movements 1  














- Exam


GENERAL DESCRIPTION: Middle-aged male lying in bed in no distress





RESPIRATORY SYSTEM: Unlabored breathing , decreased breath sounds at bases





HEART: S1 S2 regular rate and rhythm ,





ABDOMEN: Soft , mild distention and tenderness





EXTREMITIES: No edema feet








- Labs


CBC & Chem 7: 


                                 09/05/22 05:18





                                 09/05/22 05:18


Labs: 


                  Abnormal Lab Results - Last 24 Hours (Table)











  09/05/22 09/05/22 Range/Units





  05:18 05:18 


 


WBC  13.94 H   (4.50-10.00)  X 10*3/uL


 


RBC  4.16 L   (4.40-5.60)  X 10*6/uL


 


Hgb  12.7 L   (13.0-17.0)  g/dL


 


Hct  38.4 L   (39.6-50.0)  %


 


Immature Gran #  0.34 H   (0.00-0.04)  X 10*3/uL


 


Neutrophils #  10.20 H   (1.80-7.70)  X 10*3/uL


 


Monocytes #  1.07 H   (0.20-1.00)  X 10*3/uL


 


Sodium   135 L  (137-145)  mmol/L


 


Potassium   3.3 L  (3.5-5.1)  mmol/L


 


Chloride   95 L  ()  mmol/L


 


Calcium   8.0 L  (8.4-10.2)  mg/dL








                      Microbiology - Last 24 Hours (Table)











 08/31/22 14:43 Blood Culture - Preliminary





 Blood    No Growth after 120 hours


 


 08/31/22 14:43 Blood Culture - Preliminary





 Blood    No Growth after 120 hours














Assessment and Plan


(1) Acute diverticulitis


Current Visit: Yes   Status: Acute   Code(s): K57.92 - DVTRCLI OF INTEST, PART 

UNSP, W/O PERF OR ABSCESS W/O BLEED   SNOMED Code(s): 704649056


   





(2) Failure of outpatient treatment


Current Visit: Yes   Status: Acute   Code(s): Z78.9 - OTHER SPECIFIED HEALTH STA

TUS   SNOMED Code(s): 270680486


   


Plan: 


1patient presented to hospital with sepsis in this patient did have a fever 

tachycardia elevated white count source is acute complicated diverticulitis with

perforation and will need to cover for the enteric gram-negative both aerobes 

and anaerobes.


2patient has shown clinically improvement however did has slight worsening of 

the white count which need to monitor closely if any further worsening of the 

white count patient benefit from a repeat CT, patient to continue with Zosyn 

3.375 g every 8 hours and monitor clinical course closely


Time with Patient: Less than 30

## 2022-09-06 NOTE — P.PN
Subjective


Progress Note Date: 09/06/22


Principal diagnosis: 





Diverticulitis





 patient says his pain is about the same or may be slightly improved today.  He 

seemed to have some trouble with some dairy products given to him.  He is 

passing gas and having bowel movements.  White blood cell count   Improved 

slightly today.





Objective





- Vital Signs


Vital signs: 


                                   Vital Signs











Temp  98.7 F   09/06/22 11:12


 


Pulse  75   09/06/22 11:12


 


Resp  18   09/06/22 11:12


 


BP  158/98   09/06/22 11:12


 


Pulse Ox  95   09/06/22 11:12


 


FiO2      








                                 Intake & Output











 09/06/22 09/06/22 09/07/22





 06:59 18:59 06:59


 


Intake Total 740  


 


Balance 740  


 


Intake:   


 


    


 


    Piperacillin-Tazobactam 3 100  





    .375 gm In Sodium   





    Chloride 0.9% 100 ml @ 25   





    mls/hr IVPB Q6H [facility] Rx#:   





    [id number]   


 


    metroNIDAZOLE-NS  100  





    mg In Saline 1 100ml.bag   





    @ 100 mls/hr IVPB Q6H [facility]   





    Rx#:[id number]   


 


  Oral 540  


 


Other:   


 


  Voiding Method Toilet Toilet 


 


  # Voids 2  


 


  # Bowel Movements 1  














- Exam





Abdomen: Soft, nondistended, mild diffuse tenderness





- Labs


CBC & Chem 7: 


                                 09/06/22 06:45





                                 09/05/22 05:18


Labs: 


                  Abnormal Lab Results - Last 24 Hours (Table)











  09/06/22 Range/Units





  06:45 


 


WBC  12.74 H  (4.50-10.00)  X 10*3/uL


 


RBC  4.20 L  (4.40-5.60)  X 10*6/uL


 


Hgb  12.8 L  (13.0-17.0)  g/dL


 


Hct  38.1 L  (39.6-50.0)  %


 


Absolute Nucleated RBC  0.02 H  (0.00-0.00)  X 10*3/uL


 


Neutrophils # (Manual)  10.96 H  (2.00-8.90)  X 10*3/uL


 


Monocytes # (Manual)  0 L  (0.20-1.00)  X 10*3/uL


 


Eosinophils # (Manual)  0.38 H  (0.04-0.35)  X 10*3/uL


 


Basophils # (Manual)  0.13 H  (0.00-0.10)  X 10*3/uL


 


NRBC/100 WBC Diff  0.2 H  (0.0-0.0)  /100 WBCS








                      Microbiology - Last 24 Hours (Table)











 08/31/22 14:43 Blood Culture - Final





 Blood    No Growth after 144 hours


 


 08/31/22 14:43 Blood Culture - Final





 Blood    No Growth after 144 hours














Assessment and Plan


(1) Acute diverticulitis


Narrative/Plan: 


  41-year-old male with complicated diverticulitis.  Patient slowly improving. 

Anticipate discharge 24 to 48 hours if patient continues to do well.


Current Visit: Yes   Status: Acute   Code(s): K57.92 - DVTRCLI OF INTEST, PART 

UNSP, W/O PERF OR ABSCESS W/O BLEED   SNOMED Code(s): 099375300

## 2022-09-07 LAB
ANION GAP SERPL CALC-SCNC: 10 MMOL/L
BASOPHILS # BLD AUTO: 0.09 X 10*3/UL (ref 0–0.1)
BASOPHILS NFR BLD AUTO: 0.5 %
BUN SERPL-SCNC: 8 MG/DL (ref 9–20)
CALCIUM SPEC-MCNC: 8.3 MG/DL (ref 8.4–10.2)
CHLORIDE SERPL-SCNC: 93 MMOL/L (ref 98–107)
CO2 SERPL-SCNC: 33 MMOL/L (ref 22–30)
EOSINOPHIL # BLD AUTO: 0.22 X 10*3/UL (ref 0.04–0.35)
EOSINOPHIL NFR BLD AUTO: 1.3 %
ERYTHROCYTE [DISTWIDTH] IN BLOOD BY AUTOMATED COUNT: 4.32 X 10*6/UL (ref 4.4–5.6)
ERYTHROCYTE [DISTWIDTH] IN BLOOD: 13.1 % (ref 11.5–14.5)
GLUCOSE SERPL-MCNC: 106 MG/DL (ref 74–99)
HCT VFR BLD AUTO: 38.8 % (ref 39.6–50)
HGB BLD-MCNC: 13.3 G/DL (ref 13–17)
IMM GRANULOCYTES BLD QL AUTO: 4.2 %
LYMPHOCYTES # SPEC AUTO: 1.92 X 10*3/UL (ref 0.9–5)
LYMPHOCYTES NFR SPEC AUTO: 11.3 %
MCH RBC QN AUTO: 30.8 PG (ref 27–32)
MCHC RBC AUTO-ENTMCNC: 34.3 G/DL (ref 32–37)
MCV RBC AUTO: 89.8 FL (ref 80–97)
MONOCYTES # BLD AUTO: 1.14 X 10*3/UL (ref 0.2–1)
MONOCYTES NFR BLD AUTO: 6.7 %
NEUTROPHILS # BLD AUTO: 12.86 X 10*3/UL (ref 1.8–7.7)
NEUTROPHILS NFR BLD AUTO: 76 %
NRBC BLD AUTO-RTO: 0.2 /100 WBCS (ref 0–0)
PLATELET # BLD AUTO: 395 X 10*3/UL (ref 140–440)
POTASSIUM SERPL-SCNC: 3.3 MMOL/L (ref 3.5–5.1)
SODIUM SERPL-SCNC: 136 MMOL/L (ref 137–145)
WBC # BLD AUTO: 16.94 X 10*3/UL (ref 4.5–10)

## 2022-09-07 PROCEDURE — 3E0436Z INTRODUCTION OF NUTRITIONAL SUBSTANCE INTO CENTRAL VEIN, PERCUTANEOUS APPROACH: ICD-10-PCS

## 2022-09-07 PROCEDURE — 0DTN0ZZ RESECTION OF SIGMOID COLON, OPEN APPROACH: ICD-10-PCS

## 2022-09-07 PROCEDURE — 0D1N0Z4 BYPASS SIGMOID COLON TO CUTANEOUS, OPEN APPROACH: ICD-10-PCS

## 2022-09-07 RX ADMIN — HYDROMORPHONE HYDROCHLORIDE PRN MG: 1 INJECTION, SOLUTION INTRAMUSCULAR; INTRAVENOUS; SUBCUTANEOUS at 03:01

## 2022-09-07 RX ADMIN — METRONIDAZOLE SCH MLS/HR: 500 INJECTION, SOLUTION INTRAVENOUS at 04:58

## 2022-09-07 RX ADMIN — DEXTROSE MONOHYDRATE, SODIUM CHLORIDE, AND POTASSIUM CHLORIDE SCH MLS/HR: 50; 4.5; 1.49 INJECTION, SOLUTION INTRAVENOUS at 23:13

## 2022-09-07 RX ADMIN — ONDANSETRON PRN MG: 2 INJECTION INTRAMUSCULAR; INTRAVENOUS at 03:03

## 2022-09-07 RX ADMIN — PIPERACILLIN AND TAZOBACTAM SCH MLS/HR: 3; .375 INJECTION, POWDER, FOR SOLUTION INTRAVENOUS at 16:31

## 2022-09-07 RX ADMIN — PANTOPRAZOLE SODIUM SCH MG: 40 INJECTION, POWDER, FOR SOLUTION INTRAVENOUS at 07:49

## 2022-09-07 RX ADMIN — PIPERACILLIN AND TAZOBACTAM SCH MLS/HR: 3; .375 INJECTION, POWDER, FOR SOLUTION INTRAVENOUS at 09:22

## 2022-09-07 RX ADMIN — KETOROLAC TROMETHAMINE PRN MG: 15 INJECTION, SOLUTION INTRAMUSCULAR; INTRAVENOUS at 09:56

## 2022-09-07 RX ADMIN — DOCUSATE SODIUM SCH MG: 100 CAPSULE, LIQUID FILLED ORAL at 02:31

## 2022-09-07 RX ADMIN — HYDROMORPHONE HYDROCHLORIDE PRN MG: 1 INJECTION, SOLUTION INTRAMUSCULAR; INTRAVENOUS; SUBCUTANEOUS at 05:11

## 2022-09-07 RX ADMIN — FENTANYL CITRATE ONE MCG: 50 INJECTION, SOLUTION INTRAMUSCULAR; INTRAVENOUS at 18:11

## 2022-09-07 RX ADMIN — ONDANSETRON PRN MG: 2 INJECTION INTRAMUSCULAR; INTRAVENOUS at 18:10

## 2022-09-07 RX ADMIN — HYDROMORPHONE HYDROCHLORIDE PRN MG: 1 INJECTION, SOLUTION INTRAMUSCULAR; INTRAVENOUS; SUBCUTANEOUS at 16:31

## 2022-09-07 RX ADMIN — DOCUSATE SODIUM SCH: 100 CAPSULE, LIQUID FILLED ORAL at 23:13

## 2022-09-07 RX ADMIN — SIMETHICONE SCH: 20 SUSPENSION/ DROPS ORAL at 23:14

## 2022-09-07 RX ADMIN — HYDROMORPHONE HYDROCHLORIDE PRN MG: 1 INJECTION, SOLUTION INTRAMUSCULAR; INTRAVENOUS; SUBCUTANEOUS at 23:42

## 2022-09-07 RX ADMIN — CEFAZOLIN SCH MLS/HR: 330 INJECTION, POWDER, FOR SOLUTION INTRAMUSCULAR; INTRAVENOUS at 12:07

## 2022-09-07 RX ADMIN — SIMETHICONE SCH: 20 SUSPENSION/ DROPS ORAL at 17:55

## 2022-09-07 RX ADMIN — PIPERACILLIN AND TAZOBACTAM SCH MLS/HR: 3; .375 INJECTION, POWDER, FOR SOLUTION INTRAVENOUS at 23:09

## 2022-09-07 RX ADMIN — KETOROLAC TROMETHAMINE PRN MG: 15 INJECTION, SOLUTION INTRAMUSCULAR; INTRAVENOUS at 02:40

## 2022-09-07 RX ADMIN — PIPERACILLIN AND TAZOBACTAM SCH MLS/HR: 3; .375 INJECTION, POWDER, FOR SOLUTION INTRAVENOUS at 02:30

## 2022-09-07 RX ADMIN — HYDROMORPHONE HYDROCHLORIDE PRN MG: 1 INJECTION, SOLUTION INTRAMUSCULAR; INTRAVENOUS; SUBCUTANEOUS at 22:43

## 2022-09-07 RX ADMIN — IOPAMIDOL PRN ML: 612 INJECTION, SOLUTION INTRAVENOUS at 15:06

## 2022-09-07 RX ADMIN — HYDROMORPHONE HYDROCHLORIDE PRN MG: 1 INJECTION, SOLUTION INTRAMUSCULAR; INTRAVENOUS; SUBCUTANEOUS at 07:52

## 2022-09-07 RX ADMIN — HYDROMORPHONE HYDROCHLORIDE PRN MG: 1 INJECTION, SOLUTION INTRAMUSCULAR; INTRAVENOUS; SUBCUTANEOUS at 15:07

## 2022-09-07 RX ADMIN — METRONIDAZOLE SCH MLS/HR: 500 INJECTION, SOLUTION INTRAVENOUS at 07:51

## 2022-09-07 RX ADMIN — DOCUSATE SODIUM SCH MG: 100 CAPSULE, LIQUID FILLED ORAL at 09:19

## 2022-09-07 RX ADMIN — HEPARIN SODIUM SCH UNIT: 5000 INJECTION INTRAVENOUS; SUBCUTANEOUS at 23:47

## 2022-09-07 RX ADMIN — ONDANSETRON PRN MG: 2 INJECTION INTRAMUSCULAR; INTRAVENOUS at 09:19

## 2022-09-07 RX ADMIN — SIMETHICONE SCH MG: 20 SUSPENSION/ DROPS ORAL at 12:06

## 2022-09-07 RX ADMIN — CEFAZOLIN SCH MLS/HR: 330 INJECTION, POWDER, FOR SOLUTION INTRAMUSCULAR; INTRAVENOUS at 23:12

## 2022-09-07 RX ADMIN — HYDROMORPHONE HYDROCHLORIDE PRN MG: 1 INJECTION, SOLUTION INTRAMUSCULAR; INTRAVENOUS; SUBCUTANEOUS at 01:33

## 2022-09-07 RX ADMIN — FENTANYL CITRATE ONE MCG: 50 INJECTION, SOLUTION INTRAMUSCULAR; INTRAVENOUS at 18:28

## 2022-09-07 RX ADMIN — ONDANSETRON PRN MG: 2 INJECTION INTRAMUSCULAR; INTRAVENOUS at 13:37

## 2022-09-07 RX ADMIN — SIMETHICONE SCH MG: 20 SUSPENSION/ DROPS ORAL at 07:49

## 2022-09-07 RX ADMIN — HEPARIN SODIUM SCH: 5000 INJECTION INTRAVENOUS; SUBCUTANEOUS at 23:21

## 2022-09-07 RX ADMIN — HYDROMORPHONE HYDROCHLORIDE PRN MG: 1 INJECTION, SOLUTION INTRAMUSCULAR; INTRAVENOUS; SUBCUTANEOUS at 12:06

## 2022-09-07 RX ADMIN — IOPAMIDOL PRN ML: 612 INJECTION, SOLUTION INTRAVENOUS at 13:38

## 2022-09-07 RX ADMIN — HEPARIN SODIUM SCH UNIT: 5000 INJECTION INTRAVENOUS; SUBCUTANEOUS at 07:49

## 2022-09-07 NOTE — P.PN
Subjective


Progress Note Date: 09/07/22





CHIEF COMPLAINT: Perforated Diverticulitis





HISTORY OF PRESENT ILLNESS: Patient is complaining of more right lower quadrant 

abdominal pain.  He did have episode of vomiting yesterday.  He does rate his 

pain 5 out of 10.  He reports his pain was about 6 out of 10 yesterday.  His 

only passing a small amount of flatus during the night.  Has had no bowel 

movement in 24 hours.  He does report increased discomfort on that right side of

the abdomen.  Afebrile.  WBC is up from 12.74-16.94.  Potassium 3.3 and being 

replaced





PHYSICAL EXAM: 


VITAL SIGNS: Reviewed.


GENERAL: Well-developed in no acute distress. 


HEENT:  No sclera icterus. Extraocular movements grossly intact.  Moist buccal 

mucosa. Head is atraumatic, normocephalic. 


ABDOMEN:  Soft.  Nondistended.  Tenderness to palpation of the right lower 

abdomen


NEUROLOGIC: Alert and oriented. Cranial nerves II through XII grossly intact.





ASSESSMENT: 


1.  Perforated diverticulitis


2.  Leukocytosis





PLAN: 


-Due to patient's increase in white count and abdominal pain computed tomography

scan of the abdomen and pelvis with oral and IV contrast ordered for further 

evaluation


-Continue antibiotics


-Continue full liquid diet.  Discussed with patient to eat only a small amount. 

If symptoms worsen then to stop eating


-Continue pain medication as needed


-Continue IV fluids


-Potassium being replaced





Physician Assistant note has been reviewed by physician. Signing provider agrees

with the documented findings, assessment, and plan of care. 





Objective





- Vital Signs


Vital signs: 


                                   Vital Signs











Temp  98.1 F   09/07/22 11:03


 


Pulse  71   09/07/22 11:03


 


Resp  18   09/07/22 11:03


 


BP  147/95   09/07/22 11:03


 


Pulse Ox  98   09/07/22 11:03


 


FiO2      








                                 Intake & Output











 09/06/22 09/07/22 09/07/22





 18:59 06:59 18:59


 


Other:   


 


  Voiding Method Toilet Toilet 


 


  # Bowel Movements  0 














- Labs


CBC & Chem 7: 


                                 09/07/22 05:27





                                 09/07/22 05:27


Labs: 


                  Abnormal Lab Results - Last 24 Hours (Table)











  09/07/22 09/07/22 Range/Units





  05:27 05:27 


 


WBC  16.94 H   (4.50-10.00)  X 10*3/uL


 


RBC  4.32 L   (4.40-5.60)  X 10*6/uL


 


Hct  38.8 L   (39.6-50.0)  %


 


Absolute Nucleated RBC  0.03 H   (0.00-0.00)  X 10*3/uL


 


Immature Gran #  0.71 H   (0.00-0.04)  X 10*3/uL


 


Neutrophils #  12.86 H   (1.80-7.70)  X 10*3/uL


 


Monocytes #  1.14 H   (0.20-1.00)  X 10*3/uL


 


NRBC/100 WBC Diff  0.2 H   (0.0-0.0)  /100 WBCS


 


Sodium   136 L  (137-145)  mmol/L


 


Potassium   3.3 L  (3.5-5.1)  mmol/L


 


Chloride   93 L  ()  mmol/L


 


Carbon Dioxide   33 H  (22-30)  mmol/L


 


BUN   8 L  (9-20)  mg/dL


 


Glucose   106 H  (74-99)  mg/dL


 


Calcium   8.3 L  (8.4-10.2)  mg/dL








                      Microbiology - Last 24 Hours (Table)











 08/31/22 14:43 Blood Culture - Final





 Blood    No Growth after 144 hours


 


 08/31/22 14:43 Blood Culture - Final





 Blood    No Growth after 144 hours

## 2022-09-07 NOTE — P.PN
Progress Note - Text


Progress Note Date: 09/07/22





Patient's white blood cell count was elevated this morning he was also having 

increased abdominal discomfort.  He and I discussed the options and ordered a 

CAT scan.  The CAT scan films were reviewed and unfortunately do demonstrate 

worsening abscess in the sigmoid colon region.  There is some tracking of air in

the retroperitoneum.  Overall the degree of extraluminal pneumoperitoneum 

elsewhere in the abdomen is improved.  I suspect the patient reperforate in the 

last 24-36 hours.  Patient I discussed the options.  We'll proceed with 

exploratory laparotomy with partial colectomy and end colostomy at this time.  

Risks of bleeding, infection, abscess, hernia, dehiscence, ostomy-related com

plications, ureteral and bowel injury, need for further surgery reviewed.  He 

understands and wishes to proceed.

## 2022-09-07 NOTE — P.OP
Date of Procedure: 09/07/22


Procedure(s) Performed: 


PREOPERATIVE DIAGNOSIS: Perforated sigmoid diverticulitis


POSTOPERATIVE DIAGNOSIS: Same


PROCEDURE: Sigmoid colectomy with end colostomy


SURGEON: Ángel


EBL: 250 mL


ANESTHESIA: General


COMPLICATIONS: None


OPERATIVE PROCEDURE: Patient was placed on the operative table in the supine 

position.  The patient was then elevated anteriorly with the placed under 

general anesthesia.  The abdomen was prepped and draped in usual sterile 

fashion.  A vertical incision was made encompassing extending from the 

suprapubic region above the umbilicus.  This was later lengthened superiorly.  

The fascia was divided as well.  The patient had some loose adhesions to the 

right lower abdominal wall.  Once that was freed up we were able to use the 

Bookwalter retractor.  The patient had extensive adhesions between the small 

bowel loops with small bowel loops also adherent to the sigmoid colon and mes

enteric abscess that was present.  Blunt dissection took place to free up all of

the small bowel loops.  Any purulent fluid was evacuated.  There were a few 

portions of the small bowel that almost had a partial obstruction appearance.  

The patient's sigmoid colon was able to be mobilized.  The abscess was present 

on the mesenteric side of the mid sigmoid colon extending to the right.  Once I 

had the sigmoid colon defined the bowel was divided proximally and distally 

using a linear 75 stapler.  The mesentery was then divided using the LigaSure 

device and silk ties.  The specimen was passed off.  As I inspected the distal 

sigmoid colon there was noted to be a one-inch segment or so that was somewhat 

ischemic appearing because of the dissection on the mesentery.  I removed 

another 1 inch or so of the distal aspect of the sigmoid colon.  This was also 

performed using a linear stapler.  The sigmoid colon was then mobilized by 

incising the white line of Toldt.  Once I had enough length on the colon the 

abdomen was copiously irrigated with 3 L of saline.  No further purulence was 

encountered at that time.  A circular incision was made in the left midabdomen. 

Dissection through the subcutaneous fat and fascia took place using 

electrocautery.  I bluntly entered the peritoneal cavity and this was further 

bluntly opened.  The bowel was brought out through this defect in the left 

midabdomen.  A drain was placed into the pelvis through a right lower quadrant 

stab incision.  This was sutured in place using a 3-0 silk stitch.  The midline 

fascia was then reapproximated using 3 separate double-stranded #1 PDS sutures. 

The subcutaneous tissues were irrigated.  The subcutaneous tissues were closed 

using 3-0 Vicryl sutures.  The skin was then closed using staples.  3 separate 

sites were left open along the midline incision for Telfa wick placement.  The 

ostomy was then addressed.  A portion of the pericolonic fat was removed using 

the LigaSure device.  The staple line was then removed using electrocautery.  

The ostomy was then matured in a rosebud fashion using interrupted 3-0 Vicryl 

sutures.  An ostomy appliance was then applied.  Sterile dressings were then 

applied to the midline incision.


DISPOSITION: Stable to recovery room

## 2022-09-08 LAB
ANION GAP SERPL CALC-SCNC: 8.6 MMOL/L (ref 10–18)
BASOPHILS # BLD AUTO: 0.06 X 10*3/UL (ref 0–0.1)
BASOPHILS NFR BLD AUTO: 0.3 %
BUN SERPL-SCNC: 9.4 MG/DL (ref 9–27)
BUN/CREAT SERPL: 9.4 RATIO (ref 12–20)
CALCIUM SPEC-MCNC: 8.3 MG/DL (ref 8.7–10.3)
CHLORIDE SERPL-SCNC: 98 MMOL/L (ref 96–109)
CO2 SERPL-SCNC: 29.4 MMOL/L (ref 20–27.5)
EOSINOPHIL # BLD AUTO: 0 X 10*3/UL (ref 0.04–0.35)
EOSINOPHIL NFR BLD AUTO: 0 %
ERYTHROCYTE [DISTWIDTH] IN BLOOD BY AUTOMATED COUNT: 4.31 X 10*6/UL (ref 4.4–5.6)
ERYTHROCYTE [DISTWIDTH] IN BLOOD: 13.2 % (ref 11.5–14.5)
GLUCOSE SERPL-MCNC: 174 MG/DL (ref 70–110)
HCT VFR BLD AUTO: 38.9 % (ref 39.6–50)
HGB BLD-MCNC: 13.1 G/DL (ref 13–17)
IMM GRANULOCYTES BLD QL AUTO: 2.3 %
LYMPHOCYTES # SPEC AUTO: 0.87 X 10*3/UL (ref 0.9–5)
LYMPHOCYTES NFR SPEC AUTO: 3.9 %
MCH RBC QN AUTO: 30.4 PG (ref 27–32)
MCHC RBC AUTO-ENTMCNC: 33.7 G/DL (ref 32–37)
MCV RBC AUTO: 90.3 FL (ref 80–97)
MONOCYTES # BLD AUTO: 0.99 X 10*3/UL (ref 0.2–1)
MONOCYTES NFR BLD AUTO: 4.4 %
NEUTROPHILS # BLD AUTO: 19.83 X 10*3/UL (ref 1.8–7.7)
NEUTROPHILS NFR BLD AUTO: 89.1 %
NRBC BLD AUTO-RTO: 0 /100 WBCS (ref 0–0)
PLATELET # BLD AUTO: 442 X 10*3/UL (ref 140–440)
POTASSIUM SERPL-SCNC: 4.1 MMOL/L (ref 3.5–5.5)
SODIUM SERPL-SCNC: 136 MMOL/L (ref 135–145)
WBC # BLD AUTO: 22.26 X 10*3/UL (ref 4.5–10)

## 2022-09-08 RX ADMIN — ACETAMINOPHEN SCH MLS/HR: 10 INJECTION, SOLUTION INTRAVENOUS at 06:33

## 2022-09-08 RX ADMIN — DOCUSATE SODIUM SCH: 100 CAPSULE, LIQUID FILLED ORAL at 08:52

## 2022-09-08 RX ADMIN — KETOROLAC TROMETHAMINE SCH MG: 15 INJECTION, SOLUTION INTRAMUSCULAR; INTRAVENOUS at 18:02

## 2022-09-08 RX ADMIN — ACETAMINOPHEN SCH MLS/HR: 10 INJECTION, SOLUTION INTRAVENOUS at 18:03

## 2022-09-08 RX ADMIN — SIMETHICONE SCH: 20 SUSPENSION/ DROPS ORAL at 17:59

## 2022-09-08 RX ADMIN — ACETAMINOPHEN SCH MLS/HR: 10 INJECTION, SOLUTION INTRAVENOUS at 12:11

## 2022-09-08 RX ADMIN — PANTOPRAZOLE SODIUM SCH MG: 40 INJECTION, POWDER, FOR SOLUTION INTRAVENOUS at 08:52

## 2022-09-08 RX ADMIN — SIMETHICONE SCH: 20 SUSPENSION/ DROPS ORAL at 15:48

## 2022-09-08 RX ADMIN — DEXTROSE MONOHYDRATE, SODIUM CHLORIDE, AND POTASSIUM CHLORIDE SCH MLS/HR: 50; 4.5; 1.49 INJECTION, SOLUTION INTRAVENOUS at 20:16

## 2022-09-08 RX ADMIN — ONDANSETRON PRN MG: 2 INJECTION INTRAMUSCULAR; INTRAVENOUS at 15:48

## 2022-09-08 RX ADMIN — DEXTROSE MONOHYDRATE, SODIUM CHLORIDE, AND POTASSIUM CHLORIDE SCH MLS/HR: 50; 4.5; 1.49 INJECTION, SOLUTION INTRAVENOUS at 12:41

## 2022-09-08 RX ADMIN — HYDROMORPHONE HYDROCHLORIDE PRN MG: 1 INJECTION, SOLUTION INTRAMUSCULAR; INTRAVENOUS; SUBCUTANEOUS at 05:38

## 2022-09-08 RX ADMIN — CEFAZOLIN SCH: 330 INJECTION, POWDER, FOR SOLUTION INTRAMUSCULAR; INTRAVENOUS at 20:02

## 2022-09-08 RX ADMIN — HYDROMORPHONE HYDROCHLORIDE PRN MG: 1 INJECTION, SOLUTION INTRAMUSCULAR; INTRAVENOUS; SUBCUTANEOUS at 08:49

## 2022-09-08 RX ADMIN — SIMETHICONE SCH: 20 SUSPENSION/ DROPS ORAL at 10:36

## 2022-09-08 RX ADMIN — HYDROMORPHONE HYDROCHLORIDE PRN MG: 1 INJECTION, SOLUTION INTRAMUSCULAR; INTRAVENOUS; SUBCUTANEOUS at 15:48

## 2022-09-08 RX ADMIN — ONDANSETRON PRN MG: 2 INJECTION INTRAMUSCULAR; INTRAVENOUS at 23:01

## 2022-09-08 RX ADMIN — PIPERACILLIN AND TAZOBACTAM SCH MLS/HR: 3; .375 INJECTION, POWDER, FOR SOLUTION INTRAVENOUS at 12:41

## 2022-09-08 RX ADMIN — HYDROMORPHONE HYDROCHLORIDE PRN MG: 1 INJECTION, SOLUTION INTRAMUSCULAR; INTRAVENOUS; SUBCUTANEOUS at 20:16

## 2022-09-08 RX ADMIN — HYDROMORPHONE HYDROCHLORIDE PRN MG: 1 INJECTION, SOLUTION INTRAMUSCULAR; INTRAVENOUS; SUBCUTANEOUS at 00:38

## 2022-09-08 RX ADMIN — SIMETHICONE SCH: 20 SUSPENSION/ DROPS ORAL at 20:50

## 2022-09-08 RX ADMIN — DEXTROSE MONOHYDRATE, SODIUM CHLORIDE, AND POTASSIUM CHLORIDE SCH MLS/HR: 50; 4.5; 1.49 INJECTION, SOLUTION INTRAVENOUS at 04:48

## 2022-09-08 RX ADMIN — HYDROMORPHONE HYDROCHLORIDE PRN MG: 1 INJECTION, SOLUTION INTRAMUSCULAR; INTRAVENOUS; SUBCUTANEOUS at 13:31

## 2022-09-08 RX ADMIN — HEPARIN SODIUM SCH UNIT: 5000 INJECTION INTRAVENOUS; SUBCUTANEOUS at 16:01

## 2022-09-08 RX ADMIN — PIPERACILLIN AND TAZOBACTAM SCH MLS/HR: 3; .375 INJECTION, POWDER, FOR SOLUTION INTRAVENOUS at 02:57

## 2022-09-08 RX ADMIN — HYDROMORPHONE HYDROCHLORIDE PRN MG: 1 INJECTION, SOLUTION INTRAMUSCULAR; INTRAVENOUS; SUBCUTANEOUS at 02:01

## 2022-09-08 RX ADMIN — HYDROMORPHONE HYDROCHLORIDE PRN MG: 1 INJECTION, SOLUTION INTRAMUSCULAR; INTRAVENOUS; SUBCUTANEOUS at 23:00

## 2022-09-08 RX ADMIN — HEPARIN SODIUM SCH UNIT: 5000 INJECTION INTRAVENOUS; SUBCUTANEOUS at 08:52

## 2022-09-08 RX ADMIN — PIPERACILLIN AND TAZOBACTAM SCH MLS/HR: 3; .375 INJECTION, POWDER, FOR SOLUTION INTRAVENOUS at 20:15

## 2022-09-08 RX ADMIN — ACETAMINOPHEN SCH MLS/HR: 10 INJECTION, SOLUTION INTRAVENOUS at 00:51

## 2022-09-08 RX ADMIN — HYDROMORPHONE HYDROCHLORIDE PRN MG: 1 INJECTION, SOLUTION INTRAMUSCULAR; INTRAVENOUS; SUBCUTANEOUS at 02:59

## 2022-09-08 RX ADMIN — DOCUSATE SODIUM SCH MG: 100 CAPSULE, LIQUID FILLED ORAL at 20:16

## 2022-09-08 NOTE — CT
EXAMINATION TYPE: CT abdomen pelvis w con

 

DATE OF EXAM: 9/7/2022

 

COMPARISON: CT 9/1/2022

 

HISTORY: Diverticulitis

 

CT DLP: The 3313.9 mGycm

Automated exposure control for dose reduction was used.

 

TECHNIQUE:  Helical acquisition of images from the lung bases through the pelvis have been completed.


 

CONTRAST: 

Performed with Oral Contrast and with IV Contrast, patient injected with 100 mL of Isovue 370.

 

FINDINGS: Bilateral inguinal hernias contain fat

 

LUNG BASES: No significant abnormality is appreciated.

 

AORTA:  No significant abnormality is appreciated.

 

LIVER/GB: Patient is post cholecystectomy. Low-attenuation within the liver may be due to hepatic minesh
atosis.

 

PANCREAS: No significant abnormality is seen.

 

SPLEEN: No significant abnormality is seen.

 

ADRENALS: No significant abnormality is seen.

 

KIDNEYS: Low-attenuation, loss of cortical medullary differentiation present at the upper pole the ri
ght kidney which is developed in the interval. Retroaortic left renal vein is noted

 

 

REPRODUCTIVE ORGANS: Prostate shows some calcification and is somewhat enlarged.

 

BOWEL:  There is diverticular change seen in the sigmoid colon with pericolonic inflammatory change, 
there is fluid attenuation adjacent to the colon towards the midline and right lower quadrant, air-fl
uid levels also present at this level.

 

FREE AIR:  No Free Air visible.

 

ASCITES:  None visible.

 

PELVIC ADENOPATHY:  None visualized.

 

RETROPERITONEAL ADENOPATHY:  No Retroperitoneal Adenopathy visible.

 

URINARY BLADDER:  No significant abnormality is seen.

 

OSSEOUS STRUCTURES:  No significant abnormality is seen.

 

IMPRESSION: 

DIVERTICULAR ABSCESS AND LIKELY PERFORATION IS NOTED WITH INFLAMMATORY CHANGE. PROBABLE RIGHT RENAL I
NFARCT AT THE UPPER POLE. POSTOP CHANGES. CASE DISCUSSED WITH THE REFERRING CLINICIAN.

## 2022-09-08 NOTE — P.PN
Subjective


Progress Note Date: 09/08/22





CHIEF COMPLAINT: Perforated Diverticulitis





HISTORY OF PRESENT ILLNESS: Patient is postop day #1 status post sigmoid 

colectomy with end colostomy for perforated sigmoid diverticulitis.  Patient 

reports that his abdominal pain is controlled in different from yesterday.  

Reports more of an incisional pain.  He denies any nausea or vomiting.  NG tube 

in place with the 100 mL bilious output.  BRYSON drain with 40 mL of serosanguineous

output.  Afebrile.  WBC is up from 16.94-22.26 hemoglobin 13.1 sodium 136 p

otassium 4.1 creatinine 1.0





PHYSICAL EXAM: 


VITAL SIGNS: Reviewed.


GENERAL: Well-developed in no acute distress. 


HEENT:  No sclera icterus. Extraocular movements grossly intact.  Moist buccal 

mucosa. Head is atraumatic, normocephalic. 


ABDOMEN:  Soft.  Nondistended.  Incisional dressing small amount of blood 

saturation noted at the distal aspect.  Colostomy on the left side of the 

abdomen.  Stoma beefy red.  Serosanguineous drainage noted in colostomy bag


NEUROLOGIC: Alert and oriented. Cranial nerves II through XII grossly intact.





ASSESSMENT: 


1.  Perforated sigmoid diverticulitis status post sigmoid colectomy with end 

colostomy


2.  Leukocytosis





PLAN: 


-Continue NG tube for decompression


-Keep patient nothing by mouth


-Continue IV fluids


-Continue IV antibiotics


-Continue pain medication as needed


-GI prophylaxis Protonix and DVT prophylaxis subcu heparin





Physician Assistant note has been reviewed by physician. Signing provider agrees

with the documented findings, assessment, and plan of care. 





I have personally seen and examined the patient, reviewed the NP /PAs history, 

exam and MDM and agree with the assessment and plan as written. Based on total 

visit time, I have performed more than 50% of the visit.





As above:  Patient says the pain he was having preoperatively is improved.  Now 

he is having incisional pain.  Colostomy is pink without function thus far.  BRYSON 

draining serosanguineous.  Labs noted.  Continue antibiotics.  Gradually 

increase activity.  Add Toradol for pain control.





Objective





- Vital Signs


Vital signs: 


                                   Vital Signs











Temp  97.7 F   09/08/22 06:13


 


Pulse  87   09/08/22 06:13


 


Resp  16   09/08/22 06:13


 


BP  124/82   09/08/22 06:13


 


Pulse Ox  92 L  09/08/22 06:13


 


FiO2      








                                 Intake & Output











 09/07/22 09/08/22 09/08/22





 18:59 06:59 18:59


 


Intake Total 1120 600 


 


Output Total  2130 


 


Balance 1120 -1530 


 


Weight  138.346 kg 


 


Intake:   


 


   600 


 


    Piperacillin-Tazobactam 3 100  





    .375 gm In Sodium   





    Chloride 0.9% 100 ml @ 25   





    mls/hr IVPB Q6H [facility] Rx#:   





    [id number]   


 


    metroNIDAZOLE-NS  100  





    mg In Saline 1 100ml.bag   





    @ 100 mls/hr IVPB Q6H [facility]   





    Rx#:[id number]   


 


  Intake, IV Titration 100  





  Amount   


 


    Potassium Chloride 10 meq 100  





    In Water For Injection 1   





    100ml.bag @ 100 mls/hr   





    IVPB ONCE ONE Rx#:   





    [id number]   


 


  Oral 120  


 


Output:   


 


  Gastric Drainage  90 


 


  Drainage  80 


 


    Right Abdomen  80 


 


  Urine  1650 


 


    Uretheral (Early)  900 


 


  Estimated Blood Loss  310 


 


Other:   


 


  Voiding Method  Toilet 


 


  # Voids 4  


 


  # Bowel Movements  0 














- Labs


CBC & Chem 7: 


                                 09/08/22 08:08





                                 09/08/22 08:08


Labs: 


                  Abnormal Lab Results - Last 24 Hours (Table)











  09/08/22 09/08/22 Range/Units





  08:08 08:08 


 


WBC  22.26 H   (4.50-10.00)  X 10*3/uL


 


RBC  4.31 L   (4.40-5.60)  X 10*6/uL


 


Hct  38.9 L   (39.6-50.0)  %


 


Plt Count  442 H   (140-440)  X 10*3/uL


 


Immature Gran #  0.51 H   (0.00-0.04)  X 10*3/uL


 


Neutrophils #  19.83 H   (1.80-7.70)  X 10*3/uL


 


Lymphocytes #  0.87 L   (0.90-5.00)  X 10*3/uL


 


Eosinophils #  0 L   (0.04-0.35)  X 10*3/uL


 


Carbon Dioxide   29.4 H  (20.0-27.5)  mmol/L


 


Anion Gap   8.60 L  (10.00-18.00)  mmol/L


 


BUN/Creatinine Ratio   9.40 L  (12.00-20.00)  Ratio


 


Glucose   174 H  ()  mg/dL


 


Calcium   8.3 L  (8.7-10.3)  mg/dL

## 2022-09-08 NOTE — P.GSCN
History of Present Illness


Consult date: 09/08/22


Reason for Consult: 


Right renal abnormality


Requesting physician: Alan Neal


History of present illness: 


The patient is a 41-year-old white male admitted 08/31/2022 with perforated 

diverticulitis, for which he ultimately underwent a sigmoid colectomy and end 

colostomy yesterday evening.  He has had several CT scans done during this 

hospitalization.  The first was performed at the time of admission, revealing 

normal renal perfusion.  An incidental right simple renal cyst was seen.  A 

repeat CT scan performed yesterday suggested impaired perfusion to the right 

upper pole.  He is concerned about renal function because his grandfather had 

renal failure, but the patient has no history of renal disease.








Review of Systems





- Gastrointestinal


Reports as per HPI





- Genitourinary


Denies dysuria, Denies flank pain, Denies hematuria





Past Medical History


Past Medical History: No Reported History


History of Any Multi-Drug Resistant Organisms: None Reported


Past Surgical History: Cholecystectomy, Orthopedic Surgery


Additional Past Surgical History / Comment(s): sinus


Past Anesthesia/Blood Transfusion Reactions: No Reported Reaction


Additional Past Anesthesia/Blood Transfusion Reaction / Comm: Nauea


Past Psychological History: No Psychological Hx Reported


Smoking Status: Never smoker


Past Alcohol Use History: None Reported


Past Drug Use History: None Reported





Medications and Allergies


                                Home Medications











 Medication  Instructions  Recorded  Confirmed  Type


 


Omeprazole 20 mg PO DAILY 06/14/21 08/31/22 History


 


Amoxic-Pot Clav 875-125Mg 1 tab PO Q12HR #20 tab 08/30/22 08/31/22 Rx





[Augmentin 875-125]    


 


Ondansetron Odt [Zofran Odt] 4 mg PO Q8HR PRN #10 tab 08/30/22 08/31/22 Rx


 


Testosterone Cypionate 200 mg IM Q14D 08/30/22 08/31/22 History





[Depo-Testosterone]    








                                    Allergies











Allergy/AdvReac Type Severity Reaction Status Date / Time


 


No Known Allergies Allergy   Verified 08/31/22 16:06














Surgical - Exam


                                   Vital Signs











Temp Pulse Resp BP Pulse Ox


 


 100.4 F H  96   18   127/79   96 


 


 08/31/22 14:31  08/31/22 14:31  08/31/22 14:31  08/31/22 14:31  08/31/22 14:31














- General


well developed, well nourished, no distress





- Respiratory


normal respiratory effort





- Genitourinary


normal penis with no external lesions, testicles non-tender





- Psychiatric


oriented to time, oriented to person, oriented to place, speech is normal, 

memory intact





Results





- Labs





                                 09/08/22 08:08





                                 09/08/22 08:08


                  Abnormal Lab Results - Last 24 Hours (Table)











  09/08/22 09/08/22 Range/Units





  08:08 08:08 


 


WBC  22.26 H   (4.50-10.00)  X 10*3/uL


 


RBC  4.31 L   (4.40-5.60)  X 10*6/uL


 


Hct  38.9 L   (39.6-50.0)  %


 


Plt Count  442 H   (140-440)  X 10*3/uL


 


Immature Gran #  0.51 H   (0.00-0.04)  X 10*3/uL


 


Neutrophils #  19.83 H   (1.80-7.70)  X 10*3/uL


 


Lymphocytes #  0.87 L   (0.90-5.00)  X 10*3/uL


 


Eosinophils #  0 L   (0.04-0.35)  X 10*3/uL


 


Carbon Dioxide   29.4 H  (20.0-27.5)  mmol/L


 


Anion Gap   8.60 L  (10.00-18.00)  mmol/L


 


BUN/Creatinine Ratio   9.40 L  (12.00-20.00)  Ratio


 


Glucose   174 H  ()  mg/dL


 


Calcium   8.3 L  (8.7-10.3)  mg/dL








                                 Diabetes panel











  09/08/22 Range/Units





  08:08 


 


Sodium  136  (135-145)  mmol/L


 


Potassium  4.1  (3.5-5.5)  mmol/L


 


Chloride  98  ()  mmol/L


 


Carbon Dioxide  29.4 H  (20.0-27.5)  mmol/L


 


BUN  9.4  (9.0-27.0)  mg/dL


 


Creatinine  1.0  (0.6-1.5)  mg/dL


 


Glucose  174 H  ()  mg/dL


 


Calcium  8.3 L  (8.7-10.3)  mg/dL








                                  Calcium panel











  09/08/22 Range/Units





  08:08 


 


Calcium  8.3 L  (8.7-10.3)  mg/dL








                                 Pituitary panel











  09/08/22 Range/Units





  08:08 


 


Sodium  136  (135-145)  mmol/L


 


Potassium  4.1  (3.5-5.5)  mmol/L


 


Chloride  98  ()  mmol/L


 


Carbon Dioxide  29.4 H  (20.0-27.5)  mmol/L


 


BUN  9.4  (9.0-27.0)  mg/dL


 


Creatinine  1.0  (0.6-1.5)  mg/dL


 


Glucose  174 H  ()  mg/dL


 


Calcium  8.3 L  (8.7-10.3)  mg/dL








                                  Adrenal panel











  09/08/22 Range/Units





  08:08 


 


Sodium  136  (135-145)  mmol/L


 


Potassium  4.1  (3.5-5.5)  mmol/L


 


Chloride  98  ()  mmol/L


 


Carbon Dioxide  29.4 H  (20.0-27.5)  mmol/L


 


BUN  9.4  (9.0-27.0)  mg/dL


 


Creatinine  1.0  (0.6-1.5)  mg/dL


 


Glucose  174 H  ()  mg/dL


 


Calcium  8.3 L  (8.7-10.3)  mg/dL














- Imaging


CT scan - abdomen: report reviewed, image reviewed





Assessment and Plan


(1) Genitourinary x-ray or scan abnormality


Current Visit: Yes   Status: Acute   Code(s): R93.89 - ABNORMAL FINDINGS ON DX 

IMAGING OF OTH BODY STRUCTURES   SNOMED Code(s): 944514539


   


Plan: 


Renal perfusion appeared normal on the CT scan performed September 1, but the CT

scan done yesterday suggested impaired perfusion to the right upper pole.  His 

serum creatinine level remains normal.  I suspect this is a transient finding 

related to infection extending into the retroperitoneum.  I do not feel that any

intervention is warranted at this time.  Repeat imaging could be considered, 

though I would avoid administration of IV contrast in the short-term unless it 

is required for evaluation of the intra-abdominal process.  I would suggest 

continued monitoring of renal function.

## 2022-09-09 LAB
ANION GAP SERPL CALC-SCNC: 8 MMOL/L (ref 10–18)
BASOPHILS # BLD AUTO: 0.1 X 10*3/UL (ref 0–0.1)
BASOPHILS NFR BLD AUTO: 0.6 %
BUN SERPL-SCNC: 9.2 MG/DL (ref 9–27)
BUN/CREAT SERPL: 11.5 RATIO (ref 12–20)
CALCIUM SPEC-MCNC: 8.1 MG/DL (ref 8.7–10.3)
CHLORIDE SERPL-SCNC: 100 MMOL/L (ref 96–109)
CO2 SERPL-SCNC: 31 MMOL/L (ref 20–27.5)
EOSINOPHIL # BLD AUTO: 0.12 X 10*3/UL (ref 0.04–0.35)
EOSINOPHIL NFR BLD AUTO: 0.7 %
ERYTHROCYTE [DISTWIDTH] IN BLOOD BY AUTOMATED COUNT: 4.11 X 10*6/UL (ref 4.4–5.6)
ERYTHROCYTE [DISTWIDTH] IN BLOOD: 13.4 % (ref 11.5–14.5)
GLUCOSE SERPL-MCNC: 124 MG/DL (ref 70–110)
HCT VFR BLD AUTO: 38.5 % (ref 39.6–50)
HGB BLD-MCNC: 12.7 G/DL (ref 13–17)
IMM GRANULOCYTES BLD QL AUTO: 2.8 %
LYMPHOCYTES # SPEC AUTO: 1.94 X 10*3/UL (ref 0.9–5)
LYMPHOCYTES NFR SPEC AUTO: 10.9 %
MCH RBC QN AUTO: 30.9 PG (ref 27–32)
MCHC RBC AUTO-ENTMCNC: 33 G/DL (ref 32–37)
MCV RBC AUTO: 93.7 FL (ref 80–97)
MONOCYTES # BLD AUTO: 1.07 X 10*3/UL (ref 0.2–1)
MONOCYTES NFR BLD AUTO: 6 %
NEUTROPHILS # BLD AUTO: 14.14 X 10*3/UL (ref 1.8–7.7)
NEUTROPHILS NFR BLD AUTO: 79 %
NRBC BLD AUTO-RTO: 0 /100 WBCS (ref 0–0)
PLATELET # BLD AUTO: 477 X 10*3/UL (ref 140–440)
POTASSIUM SERPL-SCNC: 3.9 MMOL/L (ref 3.5–5.5)
SODIUM SERPL-SCNC: 139 MMOL/L (ref 135–145)
WBC # BLD AUTO: 17.87 X 10*3/UL (ref 4.5–10)

## 2022-09-09 RX ADMIN — KETOROLAC TROMETHAMINE SCH MG: 15 INJECTION, SOLUTION INTRAMUSCULAR; INTRAVENOUS at 11:47

## 2022-09-09 RX ADMIN — PIPERACILLIN AND TAZOBACTAM SCH MLS/HR: 3; .375 INJECTION, POWDER, FOR SOLUTION INTRAVENOUS at 13:09

## 2022-09-09 RX ADMIN — KETOROLAC TROMETHAMINE SCH MG: 15 INJECTION, SOLUTION INTRAMUSCULAR; INTRAVENOUS at 00:24

## 2022-09-09 RX ADMIN — DEXTROSE MONOHYDRATE, SODIUM CHLORIDE, AND POTASSIUM CHLORIDE SCH MLS/HR: 50; 4.5; 1.49 INJECTION, SOLUTION INTRAVENOUS at 21:56

## 2022-09-09 RX ADMIN — HYDROMORPHONE HYDROCHLORIDE PRN MG: 1 INJECTION, SOLUTION INTRAMUSCULAR; INTRAVENOUS; SUBCUTANEOUS at 04:37

## 2022-09-09 RX ADMIN — ACETAMINOPHEN SCH MLS/HR: 10 INJECTION, SOLUTION INTRAVENOUS at 05:38

## 2022-09-09 RX ADMIN — HEPARIN SODIUM SCH UNIT: 5000 INJECTION INTRAVENOUS; SUBCUTANEOUS at 15:33

## 2022-09-09 RX ADMIN — DEXTROSE MONOHYDRATE, SODIUM CHLORIDE, AND POTASSIUM CHLORIDE SCH: 50; 4.5; 1.49 INJECTION, SOLUTION INTRAVENOUS at 08:51

## 2022-09-09 RX ADMIN — SIMETHICONE SCH: 20 SUSPENSION/ DROPS ORAL at 17:55

## 2022-09-09 RX ADMIN — HYDROMORPHONE HYDROCHLORIDE PRN MG: 1 INJECTION, SOLUTION INTRAMUSCULAR; INTRAVENOUS; SUBCUTANEOUS at 15:34

## 2022-09-09 RX ADMIN — SIMETHICONE SCH: 20 SUSPENSION/ DROPS ORAL at 08:50

## 2022-09-09 RX ADMIN — ONDANSETRON PRN MG: 2 INJECTION INTRAMUSCULAR; INTRAVENOUS at 15:33

## 2022-09-09 RX ADMIN — PIPERACILLIN AND TAZOBACTAM SCH MLS/HR: 3; .375 INJECTION, POWDER, FOR SOLUTION INTRAVENOUS at 04:10

## 2022-09-09 RX ADMIN — CEFAZOLIN SCH MLS/HR: 330 INJECTION, POWDER, FOR SOLUTION INTRAMUSCULAR; INTRAVENOUS at 15:34

## 2022-09-09 RX ADMIN — DOCUSATE SODIUM SCH: 100 CAPSULE, LIQUID FILLED ORAL at 19:29

## 2022-09-09 RX ADMIN — HYDROMORPHONE HYDROCHLORIDE PRN MG: 1 INJECTION, SOLUTION INTRAMUSCULAR; INTRAVENOUS; SUBCUTANEOUS at 19:39

## 2022-09-09 RX ADMIN — ONDANSETRON PRN MG: 2 INJECTION INTRAMUSCULAR; INTRAVENOUS at 07:36

## 2022-09-09 RX ADMIN — KETOROLAC TROMETHAMINE SCH MG: 15 INJECTION, SOLUTION INTRAMUSCULAR; INTRAVENOUS at 05:36

## 2022-09-09 RX ADMIN — HYDROMORPHONE HYDROCHLORIDE PRN MG: 1 INJECTION, SOLUTION INTRAMUSCULAR; INTRAVENOUS; SUBCUTANEOUS at 09:00

## 2022-09-09 RX ADMIN — SIMETHICONE SCH: 20 SUSPENSION/ DROPS ORAL at 19:29

## 2022-09-09 RX ADMIN — HEPARIN SODIUM SCH UNIT: 5000 INJECTION INTRAVENOUS; SUBCUTANEOUS at 00:24

## 2022-09-09 RX ADMIN — DOCUSATE SODIUM SCH: 100 CAPSULE, LIQUID FILLED ORAL at 08:50

## 2022-09-09 RX ADMIN — DEXTROSE MONOHYDRATE, SODIUM CHLORIDE, AND POTASSIUM CHLORIDE SCH MLS/HR: 50; 4.5; 1.49 INJECTION, SOLUTION INTRAVENOUS at 13:08

## 2022-09-09 RX ADMIN — KETOROLAC TROMETHAMINE SCH MG: 15 INJECTION, SOLUTION INTRAMUSCULAR; INTRAVENOUS at 18:05

## 2022-09-09 RX ADMIN — ACETAMINOPHEN SCH MLS/HR: 10 INJECTION, SOLUTION INTRAVENOUS at 11:56

## 2022-09-09 RX ADMIN — HYDROMORPHONE HYDROCHLORIDE PRN MG: 1 INJECTION, SOLUTION INTRAMUSCULAR; INTRAVENOUS; SUBCUTANEOUS at 21:34

## 2022-09-09 RX ADMIN — SIMETHICONE SCH: 20 SUSPENSION/ DROPS ORAL at 12:40

## 2022-09-09 RX ADMIN — HYDROMORPHONE HYDROCHLORIDE PRN MG: 1 INJECTION, SOLUTION INTRAMUSCULAR; INTRAVENOUS; SUBCUTANEOUS at 07:36

## 2022-09-09 RX ADMIN — ACETAMINOPHEN SCH MLS/HR: 10 INJECTION, SOLUTION INTRAVENOUS at 18:05

## 2022-09-09 RX ADMIN — HYDROMORPHONE HYDROCHLORIDE PRN MG: 1 INJECTION, SOLUTION INTRAMUSCULAR; INTRAVENOUS; SUBCUTANEOUS at 11:42

## 2022-09-09 RX ADMIN — HYDROMORPHONE HYDROCHLORIDE PRN MG: 1 INJECTION, SOLUTION INTRAMUSCULAR; INTRAVENOUS; SUBCUTANEOUS at 02:07

## 2022-09-09 RX ADMIN — ACETAMINOPHEN SCH MLS/HR: 10 INJECTION, SOLUTION INTRAVENOUS at 00:17

## 2022-09-09 RX ADMIN — DEXTROSE MONOHYDRATE, SODIUM CHLORIDE, AND POTASSIUM CHLORIDE SCH MLS/HR: 50; 4.5; 1.49 INJECTION, SOLUTION INTRAVENOUS at 06:01

## 2022-09-09 RX ADMIN — PANTOPRAZOLE SODIUM SCH MG: 40 INJECTION, POWDER, FOR SOLUTION INTRAVENOUS at 08:53

## 2022-09-09 RX ADMIN — HEPARIN SODIUM SCH UNIT: 5000 INJECTION INTRAVENOUS; SUBCUTANEOUS at 08:53

## 2022-09-09 RX ADMIN — PIPERACILLIN AND TAZOBACTAM SCH MLS/HR: 3; .375 INJECTION, POWDER, FOR SOLUTION INTRAVENOUS at 19:40

## 2022-09-09 NOTE — P.PN
Subjective


Progress Note Date: 09/08/22


Principal diagnosis: 


Acute complicated diverticulitis





Patient is a 41-year-old  male presented to the hospital with abdominal

pain with a recent diagnosis of diverticulitis failing outpatient oral Augmentin

therapy with repeat CAT scan shows perforation.  Patient was taken to the OR 

09/07/2022 status post laparotomy and diverting colostomy


 on today's evaluation that is 09/08/2022, the patient denies any fever or any 

chills, the patient abdominal pain is currently controlled, complaining of some 

nausea but no vomiting no chest pain shortness of breath or cough





Objective





- Vital Signs


Vital signs: 


                                   Vital Signs











Temp  97.7 F   09/08/22 06:13


 


Pulse  87   09/08/22 06:13


 


Resp  16   09/08/22 06:13


 


BP  124/82   09/08/22 06:13


 


Pulse Ox  92 L  09/08/22 06:13


 


FiO2      








                                 Intake & Output











 09/07/22 09/08/22 09/08/22





 18:59 06:59 18:59


 


Intake Total 1120 600 


 


Output Total  2130 


 


Balance 1120 -1530 


 


Weight  138.346 kg 


 


Intake:   


 


   600 


 


    Piperacillin-Tazobactam 3 100  





    .375 gm In Sodium   





    Chloride 0.9% 100 ml @ 25   





    mls/hr IVPB Q6H [facility] Rx#:   





    [id number]   


 


    metroNIDAZOLE-NS  100  





    mg In Saline 1 100ml.bag   





    @ 100 mls/hr IVPB Q6H [facility]   





    Rx#:[id number]   


 


  Intake, IV Titration 100  





  Amount   


 


    Potassium Chloride 10 meq 100  





    In Water For Injection 1   





    100ml.bag @ 100 mls/hr   





    IVPB ONCE ONE Rx#:   





    [id number]   


 


  Oral 120  


 


Output:   


 


  Gastric Drainage  90 


 


  Drainage  80 


 


    Right Abdomen  80 


 


  Urine  1650 


 


    Uretheral (Early)  900 


 


  Estimated Blood Loss  310 


 


Other:   


 


  Voiding Method  Toilet 


 


  # Voids 4  


 


  # Bowel Movements  0 














- Exam


GENERAL DESCRIPTION: Middle-aged male lying in bed in no distress





RESPIRATORY SYSTEM: Unlabored breathing , decreased breath sounds at bases





HEART: S1 S2 regular rate and rhythm ,





ABDOMEN: Soft , mild  tenderness





EXTREMITIES: No edema feet








- Labs


CBC & Chem 7: 


                                 09/08/22 08:08





                                 09/08/22 08:08





Assessment and Plan


(1) Acute diverticulitis


Current Visit: Yes   Status: Acute   Code(s): K57.92 - DVTRCLI OF INTEST, PART 

UNSP, W/O PERF OR ABSCESS W/O BLEED   SNOMED Code(s): 506541103


   





(2) Failure of outpatient treatment


Current Visit: Yes   Status: Acute   Code(s): Z78.9 - OTHER SPECIFIED HEALTH 

STATUS   SNOMED Code(s): 271174516


   


Plan: 


1patient presented to hospital with sepsis in this patient did have a fever 

tachycardia elevated white count source is acute complicated diverticulitis with

perforation and will need to cover for the enteric gram-negative both aerobes 

and anaerobes.


2patient seemed to have failed medical therapy in this patient who is status 

post laparotomy and diverting colostomy, no OR culture were done


3-patient will continue with the Zosyn and will monitor clinical course closely


Time with Patient: Less than 30

## 2022-09-09 NOTE — P.ANPRN
Procedure Note - Anesthesia





- Nerve Block Performed


  ** Bilateral Rectus Abdominis Single


Time Out Performed: Yes (1845)


Date of Procedure: 09/09/22


Procedure Start Time: 18:46


Procedure Stop Time: 18:51


Location of Patient: Phase I


Indication: Acute Post-Operative Pain, Requested by Surgeon


Specifically requested for management of pain by DrAmy: Alan Neal


Sedation Type: Sedate with meaningful contact maintained


Preparation: Sterile Prep


Position: Supine


Catheter: None


Needle Types: Pajunk


Needle Gauge: 21


Ultrasound used to visualize needle placement: Yes


Ultrasound used to observe medication spread: Yes


Injectate: 0.5% Ropivacaine (see comment for volume) (20cc each side)


Blood Aspirated: No


Pain Paresthesia on Injection Noted: No


Resistance on Injection: Normal


Image Stored and Saved: Yes


Events: Uneventful and Well Tolerated

## 2022-09-09 NOTE — P.PN
Subjective


Progress Note Date: 09/07/22


Principal diagnosis: 


Acute complicated diverticulitis





Patient is a 41-year-old  male presented to the hospital with abdominal

pain with a recent diagnosis of diverticulitis failing outpatient oral Augmentin

therapy with repeat CAT scan shows perforation.  


 on today's evaluation that is 09/07/2022, the patient remains to be afebrile, 

the patient is still complaining of left lower abdominal pain did have some 

nausea but no vomiting, the patient has been tolerating a clear liquid diet , 

the patient denies any chest pain shortness of breath or cough








Objective





- Vital Signs


Vital signs: 


                                   Vital Signs











Temp  98.1 F   09/07/22 11:03


 


Pulse  71   09/07/22 11:03


 


Resp  18   09/07/22 11:03


 


BP  147/95   09/07/22 11:03


 


Pulse Ox  98   09/07/22 11:03


 


FiO2      








                                 Intake & Output











 09/06/22 09/07/22 09/07/22





 18:59 06:59 18:59


 


Other:   


 


  Voiding Method Toilet Toilet 


 


  # Bowel Movements  0 














- Exam


GENERAL DESCRIPTION: Middle-aged male lying in bed in no distress





RESPIRATORY SYSTEM: Unlabored breathing , decreased breath sounds at bases





HEART: S1 S2 regular rate and rhythm ,





ABDOMEN: Soft , mild distention and tenderness





EXTREMITIES: No edema feet








- Labs


CBC & Chem 7: 


                                 09/08/22 08:08





                                 09/08/22 08:08


Labs: 


                  Abnormal Lab Results - Last 24 Hours (Table)











  09/07/22 09/07/22 Range/Units





  05:27 05:27 


 


WBC  16.94 H   (4.50-10.00)  X 10*3/uL


 


RBC  4.32 L   (4.40-5.60)  X 10*6/uL


 


Hct  38.8 L   (39.6-50.0)  %


 


Absolute Nucleated RBC  0.03 H   (0.00-0.00)  X 10*3/uL


 


Immature Gran #  0.71 H   (0.00-0.04)  X 10*3/uL


 


Neutrophils #  12.86 H   (1.80-7.70)  X 10*3/uL


 


Monocytes #  1.14 H   (0.20-1.00)  X 10*3/uL


 


NRBC/100 WBC Diff  0.2 H   (0.0-0.0)  /100 WBCS


 


Sodium   136 L  (137-145)  mmol/L


 


Potassium   3.3 L  (3.5-5.1)  mmol/L


 


Chloride   93 L  ()  mmol/L


 


Carbon Dioxide   33 H  (22-30)  mmol/L


 


BUN   8 L  (9-20)  mg/dL


 


Glucose   106 H  (74-99)  mg/dL


 


Calcium   8.3 L  (8.4-10.2)  mg/dL








                      Microbiology - Last 24 Hours (Table)











 08/31/22 14:43 Blood Culture - Final





 Blood    No Growth after 144 hours


 


 08/31/22 14:43 Blood Culture - Final





 Blood    No Growth after 144 hours














Assessment and Plan


(1) Acute diverticulitis


Current Visit: Yes   Status: Acute   Code(s): K57.92 - DVTRCLI OF INTEST, PART 

UNSP, W/O PERF OR ABSCESS W/O BLEED   SNOMED Code(s): 705251743


   





(2) Failure of outpatient treatment


Current Visit: Yes   Status: Acute   Code(s): Z78.9 - OTHER SPECIFIED HEALTH 

STATUS   SNOMED Code(s): 872276845


   


Plan: 


1patient presented to hospital with sepsis in this patient did have a fever 

tachycardia elevated white count source is acute complicated diverticulitis with

perforation and will need to cover for the enteric gram-negative both aerobes 

and anaerobes.


2patient did have slight worsening of his white count repeat CT has been 

ordered report is currently pending continue with the Zosyn and monitor clinical

course closely


Time with Patient: Less than 30

## 2022-09-09 NOTE — P.PN
Subjective


Progress Note Date: 09/09/22





CHIEF COMPLAINT: Perforated Diverticulitis





HISTORY OF PRESENT ILLNESS: Patient is postop day #2 status post sigmoid 

colectomy with end colostomy for perforated sigmoid diverticulitis.  Patient 

reports that his abdominal pain is less today.  Ostomy functioning yet.  Denies 

any nausea.  NG tube with 450 mL bilious output.  BRYSON drain with 80 ML 

serosanguineous output.  Afebrile.  WBC coming down from 22.26-17.87 hemoglobin 

12.7 platelet 477 sodium 139 potassium 3.9 creatinine 0.8.  Patient was seen by 

urology due to abnormal right kidney on CAT scan.  Patient had some issues with 

his Early catheter this morning.  It has been removed. 





PHYSICAL EXAM: 


VITAL SIGNS: Reviewed.


GENERAL: Well-developed in no acute distress. 


HEENT:  No sclera icterus. Extraocular movements grossly intact.  Moist buccal 

mucosa. Head is atraumatic, normocephalic. 


ABDOMEN:  Soft.  Nondistended.  Incisional dressing small amount of blood 

saturation noted on 3 spots.  Colostomy on the left side of the abdomen.  Stoma 

pink.  Serosanguineous drainage noted in colostomy bag


NEUROLOGIC: Alert and oriented. Cranial nerves II through XII grossly intact.





ASSESSMENT: 


1.  Perforated sigmoid diverticulitis status post sigmoid colectomy with end 

colostomy


2.  Leukocytosis





PLAN: 


-Change surgical dressing to ABDs


-Continue NG tube for decompression


-Keep patient nothing by mouth


-Continue IV fluids


-Continue IV antibiotics


-Continue pain medication as needed


-Encouraged patient to increase activity level


-Encouraged patient to use incentive spirometer


-GI prophylaxis Protonix and DVT prophylaxis subcu heparin





Physician Assistant note has been reviewed by physician. Signing provider agrees

with the documented findings, assessment, and plan of care. 





I have personally seen and examined the patient, reviewed the NP /PAs history, 

exam and MDM and agree with the assessment and plan as written. Based on total 

visit time, I have performed more than 50% of the visit.





As above:  Patient doing well today.  Abdominal pain is about the same may be 

slightly better.  No flatus.  Nasogastric tube remains in place.  On exam the 

patient has mild appropriate surgical tenderness.  He is distended and may be 

slightly more than yesterday although patient states that same.  Keep 

nasogastric tube to suction for now.  Will discuss with dietary starting TPN.





Objective





- Vital Signs


Vital signs: 


                                   Vital Signs











Temp  98.1 F   09/09/22 11:18


 


Pulse  81   09/09/22 11:18


 


Resp  16   09/09/22 11:18


 


BP  130/89   09/09/22 11:18


 


Pulse Ox  94 L  09/09/22 11:18


 


FiO2      








                                 Intake & Output











 09/08/22 09/09/22 09/09/22





 18:59 06:59 18:59


 


Intake Total  2000 


 


Output Total 1350 1300 1770


 


Balance -1350 700 -1770


 


Intake:   


 


  Intake, IV Titration  2000 





  Amount   


 


    ACETAMINOPHEN IV (For NPO  200 





    ) 1,000 mg In Empty Bag 1   





    bag @ 400 mls/hr IVPB   





    Q6HR [facility] Rx#:[id number]   


 


    D5-0.45% NaCl with KCl  1700 





    20Meq/l 1,000 ml @ 150   





    mls/hr IV .Q6H40M [facility] Rx#   





    [id number]   


 


    Piperacillin-Tazobactam 3  100 





    .375 gm In Sodium   





    Chloride 0.9% 100 ml @ 25   





    mls/hr IVPB Q8H [facility] Rx#:   





    [id number]   


 


Output:   


 


  Gastric Drainage   450


 


  Drainage 100  120


 


    Right Abdomen 100  120


 


  Urine 1250 1300 1200


 


Other:   


 


  Voiding Method Indwelling Catheter Indwelling Catheter 


 


  # Voids   1














- Labs


CBC & Chem 7: 


                                 09/09/22 05:34





                                 09/09/22 05:34


Labs: 


                  Abnormal Lab Results - Last 24 Hours (Table)











  09/09/22 09/09/22 Range/Units





  05:34 05:34 


 


WBC  17.87 H   (4.50-10.00)  X 10*3/uL


 


RBC  4.11 L   (4.40-5.60)  X 10*6/uL


 


Hgb  12.7 L   (13.0-17.0)  g/dL


 


Hct  38.5 L   (39.6-50.0)  %


 


Plt Count  477 H   (140-440)  X 10*3/uL


 


Immature Gran #  0.50 H   (0.00-0.04)  X 10*3/uL


 


Neutrophils #  14.14 H   (1.80-7.70)  X 10*3/uL


 


Monocytes #  1.07 H   (0.20-1.00)  X 10*3/uL


 


Carbon Dioxide   31.0 H  (20.0-27.5)  mmol/L


 


Anion Gap   8.00 L  (10.00-18.00)  mmol/L


 


BUN/Creatinine Ratio   11.50 L  (12.00-20.00)  Ratio


 


Glucose   124 H  ()  mg/dL


 


Calcium   8.1 L  (8.7-10.3)  mg/dL

## 2022-09-09 NOTE — P.PN
Subjective


Progress Note Date: 09/06/22


Principal diagnosis: 


Acute complicated diverticulitis





Patient is a 41-year-old  male presented to the hospital with abdominal

pain with a recent diagnosis of diverticulitis failing outpatient oral Augmentin

therapy with repeat CAT scan shows perforation.  


 on today's evaluation that is 09/06/2022, the patient continues to be afebrile,

the patient abdominal pain has slightly decreased in intensity the patient 

denies any nausea and vomiting, the patient has been tolerating a clear liquid 

diet , the patient denies any chest pain shortness of breath or cough








Objective





- Vital Signs


Vital signs: 


                                   Vital Signs











Temp  98.7 F   09/06/22 11:12


 


Pulse  75   09/06/22 11:12


 


Resp  18   09/06/22 11:12


 


BP  158/98   09/06/22 11:12


 


Pulse Ox  95   09/06/22 11:12


 


FiO2      








                                 Intake & Output











 09/05/22 09/06/22 09/06/22





 18:59 06:59 18:59


 


Intake Total 312 740 


 


Balance 312 740 


 


Intake:   


 


   200 


 


    Piperacillin-Tazobactam 3 100 100 





    .375 gm In Sodium   





    Chloride 0.9% 100 ml @ 25   





    mls/hr IVPB Q6H [facility] Rx#:   





    [id number]   


 


    Sodium Chloride 0.9% 1, 12  





    000 ml @ 75 mls/hr IV .   





    [id number] [facility] Rx#:[id number]   


 


    metroNIDAZOLE-NS  200 100 





    mg In Saline 1 100ml.bag   





    @ 100 mls/hr IVPB Q6H [facility]   





    Rx#:[id number]   


 


  Oral  540 


 


Other:   


 


  Voiding Method Toilet Toilet Toilet


 


  # Voids  2 


 


  # Bowel Movements  1 














- Exam


GENERAL DESCRIPTION: Middle-aged male lying in bed in no distress





RESPIRATORY SYSTEM: Unlabored breathing , decreased breath sounds at bases





HEART: S1 S2 regular rate and rhythm ,





ABDOMEN: Soft , mild distention and tenderness





EXTREMITIES: No edema feet








- Labs


CBC & Chem 7: 


                                 09/08/22 08:08





                                 09/08/22 08:08


Labs: 


                  Abnormal Lab Results - Last 24 Hours (Table)











  09/06/22 Range/Units





  06:45 


 


WBC  12.74 H  (4.50-10.00)  X 10*3/uL


 


RBC  4.20 L  (4.40-5.60)  X 10*6/uL


 


Hgb  12.8 L  (13.0-17.0)  g/dL


 


Hct  38.1 L  (39.6-50.0)  %


 


Absolute Nucleated RBC  0.02 H  (0.00-0.00)  X 10*3/uL


 


NRBC/100 WBC Diff  0.2 H  (0.0-0.0)  /100 WBCS








                      Microbiology - Last 24 Hours (Table)











 08/31/22 14:43 Blood Culture - Preliminary





 Blood    No Growth after 120 hours


 


 08/31/22 14:43 Blood Culture - Preliminary





 Blood    No Growth after 120 hours














Assessment and Plan


(1) Acute diverticulitis


Current Visit: Yes   Status: Acute   Code(s): K57.92 - DVTRCLI OF INTEST, PART 

UNSP, W/O PERF OR ABSCESS W/O BLEED   SNOMED Code(s): 282265135


   





(2) Failure of outpatient treatment


Current Visit: Yes   Status: Acute   Code(s): Z78.9 - OTHER SPECIFIED HEALTH 

STATUS   SNOMED Code(s): 474530950


   


Plan: 


1patient presented to hospital with sepsis in this patient did have a fever 

tachycardia elevated white count source is acute complicated diverticulitis with

perforation and will need to cover for the enteric gram-negative both aerobes 

and anaerobes.


2patient has shown clinically improvement and will continue with Zosyn 3.375 g 

every 8 hours and monitor clinical course closely


Time with Patient: Less than 30

## 2022-09-10 LAB
ANION GAP SERPL CALC-SCNC: 11 MMOL/L
BUN SERPL-SCNC: 12 MG/DL (ref 9–20)
CALCIUM SPEC-MCNC: 8.9 MG/DL (ref 8.4–10.2)
CHLORIDE SERPL-SCNC: 97 MMOL/L (ref 98–107)
CO2 SERPL-SCNC: 28 MMOL/L (ref 22–30)
GLUCOSE SERPL-MCNC: 104 MG/DL (ref 74–99)
MAGNESIUM SPEC-SCNC: 2.1 MG/DL (ref 1.6–2.3)
POTASSIUM SERPL-SCNC: 4.1 MMOL/L (ref 3.5–5.1)
SODIUM SERPL-SCNC: 136 MMOL/L (ref 137–145)

## 2022-09-10 RX ADMIN — HYDROMORPHONE HYDROCHLORIDE PRN MG: 1 INJECTION, SOLUTION INTRAMUSCULAR; INTRAVENOUS; SUBCUTANEOUS at 09:41

## 2022-09-10 RX ADMIN — ACETAMINOPHEN SCH MLS/HR: 10 INJECTION, SOLUTION INTRAVENOUS at 05:29

## 2022-09-10 RX ADMIN — ACETAMINOPHEN SCH MLS/HR: 10 INJECTION, SOLUTION INTRAVENOUS at 00:13

## 2022-09-10 RX ADMIN — KETOROLAC TROMETHAMINE SCH MG: 15 INJECTION, SOLUTION INTRAMUSCULAR; INTRAVENOUS at 05:30

## 2022-09-10 RX ADMIN — HYDROMORPHONE HYDROCHLORIDE PRN MG: 1 INJECTION, SOLUTION INTRAMUSCULAR; INTRAVENOUS; SUBCUTANEOUS at 19:27

## 2022-09-10 RX ADMIN — SIMETHICONE SCH: 20 SUSPENSION/ DROPS ORAL at 12:54

## 2022-09-10 RX ADMIN — HYDROMORPHONE HYDROCHLORIDE PRN MG: 1 INJECTION, SOLUTION INTRAMUSCULAR; INTRAVENOUS; SUBCUTANEOUS at 16:45

## 2022-09-10 RX ADMIN — DOCUSATE SODIUM SCH: 100 CAPSULE, LIQUID FILLED ORAL at 09:31

## 2022-09-10 RX ADMIN — SIMETHICONE SCH: 20 SUSPENSION/ DROPS ORAL at 19:53

## 2022-09-10 RX ADMIN — KETOROLAC TROMETHAMINE SCH MG: 15 INJECTION, SOLUTION INTRAMUSCULAR; INTRAVENOUS at 12:19

## 2022-09-10 RX ADMIN — SIMETHICONE SCH: 20 SUSPENSION/ DROPS ORAL at 09:32

## 2022-09-10 RX ADMIN — PIPERACILLIN AND TAZOBACTAM SCH MLS/HR: 3; .375 INJECTION, POWDER, FOR SOLUTION INTRAVENOUS at 03:25

## 2022-09-10 RX ADMIN — ACETAMINOPHEN SCH MLS/HR: 10 INJECTION, SOLUTION INTRAVENOUS at 12:21

## 2022-09-10 RX ADMIN — CEFAZOLIN SCH: 330 INJECTION, POWDER, FOR SOLUTION INTRAMUSCULAR; INTRAVENOUS at 19:52

## 2022-09-10 RX ADMIN — DOCUSATE SODIUM SCH: 100 CAPSULE, LIQUID FILLED ORAL at 19:53

## 2022-09-10 RX ADMIN — HYDROMORPHONE HYDROCHLORIDE PRN MG: 1 INJECTION, SOLUTION INTRAMUSCULAR; INTRAVENOUS; SUBCUTANEOUS at 13:40

## 2022-09-10 RX ADMIN — SIMETHICONE SCH: 20 SUSPENSION/ DROPS ORAL at 19:13

## 2022-09-10 RX ADMIN — DEXTROSE MONOHYDRATE, SODIUM CHLORIDE, AND POTASSIUM CHLORIDE SCH MLS/HR: 50; 4.5; 1.49 INJECTION, SOLUTION INTRAVENOUS at 10:25

## 2022-09-10 RX ADMIN — PANTOPRAZOLE SODIUM SCH MG: 40 INJECTION, POWDER, FOR SOLUTION INTRAVENOUS at 09:42

## 2022-09-10 RX ADMIN — HYDROMORPHONE HYDROCHLORIDE PRN MG: 1 INJECTION, SOLUTION INTRAMUSCULAR; INTRAVENOUS; SUBCUTANEOUS at 03:27

## 2022-09-10 RX ADMIN — KETOROLAC TROMETHAMINE SCH MG: 15 INJECTION, SOLUTION INTRAMUSCULAR; INTRAVENOUS at 00:14

## 2022-09-10 RX ADMIN — PIPERACILLIN AND TAZOBACTAM SCH MLS/HR: 3; .375 INJECTION, POWDER, FOR SOLUTION INTRAVENOUS at 12:51

## 2022-09-10 RX ADMIN — PIPERACILLIN AND TAZOBACTAM SCH MLS/HR: 3; .375 INJECTION, POWDER, FOR SOLUTION INTRAVENOUS at 19:28

## 2022-09-10 RX ADMIN — HEPARIN SODIUM SCH UNIT: 5000 INJECTION INTRAVENOUS; SUBCUTANEOUS at 09:41

## 2022-09-10 RX ADMIN — HYDROMORPHONE HYDROCHLORIDE PRN MG: 1 INJECTION, SOLUTION INTRAMUSCULAR; INTRAVENOUS; SUBCUTANEOUS at 01:01

## 2022-09-10 RX ADMIN — HEPARIN SODIUM SCH UNIT: 5000 INJECTION INTRAVENOUS; SUBCUTANEOUS at 00:14

## 2022-09-10 RX ADMIN — ACETAMINOPHEN SCH MLS/HR: 10 INJECTION, SOLUTION INTRAVENOUS at 18:12

## 2022-09-10 RX ADMIN — HYDROMORPHONE HYDROCHLORIDE PRN MG: 1 INJECTION, SOLUTION INTRAMUSCULAR; INTRAVENOUS; SUBCUTANEOUS at 05:52

## 2022-09-10 RX ADMIN — HYDROMORPHONE HYDROCHLORIDE PRN MG: 1 INJECTION, SOLUTION INTRAMUSCULAR; INTRAVENOUS; SUBCUTANEOUS at 21:49

## 2022-09-10 NOTE — P.PN
Progress Note - Text


Progress Note Date: 09/10/22





Patient remains resting temporally in bed.  He denies any significant pain.  

He's had no significant output through his colostomy.





On exam vital signs are stable.  Abdomen soft.  Incisions clean dry tach.





Status post González procedure for perforated diverticulitis.  Patient will 

continue continue NG tube drainage.  We will remove his nasogastric tube once 

his bowel function has returned.

## 2022-09-10 NOTE — P.PN
Subjective


Progress Note Date: 09/07/22


Principal diagnosis: 





Abdominal pain.





Patient still not feeling very well, still having abdominal pain.  He did not 

have a bowel movement yet.  He is feeling constipated.  No nausea or vomiting.  

Tolerating diet however.





Objective





- Vital Signs


Vital signs: 


                                   Vital Signs











Temp  98.1 F   09/07/22 11:03


 


Pulse  71   09/07/22 11:03


 


Resp  18   09/07/22 11:03


 


BP  147/95   09/07/22 11:03


 


Pulse Ox  98   09/07/22 11:03


 


FiO2      








                                 Intake & Output











 09/06/22 09/07/22 09/07/22





 18:59 06:59 18:59


 


Other:   


 


  Voiding Method Toilet Toilet 


 


  # Bowel Movements  0 














- Exam





Constitutional: No acute distress, conversant, pleasant


Eyes:Anicteric sclerae, moist conjunctiva, no lid-lag, PERRLA, 


ENMT: Oropharynx clear, no erythema, exudates


Neck: Supple, FROM, no masses, or JVD, No carotid bruits, No thyromegaly


Lungs: Clear to auscultation, Clear to percussion, Normal respiratory effort, no

accessory muscle use 


Cardiovascular: Heart regular in rate and rhythm, No murmurs, gallops, or rubs, 

No peripheral edema


Abdominal: Soft, diffuse abdominal tenderness, no guarding, rebound or rigidity,

Normoactive bowel sounds, No hepatomegaly, No splenomegaly, No palpable mass 


Skin: Normal temperature, tone, texture, turgor, no induration, No subcutaneous 

nodules, No rash, lesions, No ulcers


Extremities: No digital cyanosis, No clubbing, Pedal pulses intact and symmetric

al, Radial pulses intact and symmetrical, No calf tenderness 


Psychiatric: Alert and oriented to person, place and time, appropriate affect, 

intact judgement         


Neuro: Muscles Strength 5/5 in all 4 extremities, Sensation to light touch 

grossly present throughout, Cranial nerves II-XII grossly intact, no focal 

sensory deficits








- Labs


CBC & Chem 7: 


                                 09/09/22 05:34





                                 09/09/22 05:34


Labs: 


                  Abnormal Lab Results - Last 24 Hours (Table)











  09/07/22 09/07/22 Range/Units





  05:27 05:27 


 


WBC  16.94 H   (4.50-10.00)  X 10*3/uL


 


RBC  4.32 L   (4.40-5.60)  X 10*6/uL


 


Hct  38.8 L   (39.6-50.0)  %


 


Absolute Nucleated RBC  0.03 H   (0.00-0.00)  X 10*3/uL


 


Immature Gran #  0.71 H   (0.00-0.04)  X 10*3/uL


 


Neutrophils #  12.86 H   (1.80-7.70)  X 10*3/uL


 


Monocytes #  1.14 H   (0.20-1.00)  X 10*3/uL


 


NRBC/100 WBC Diff  0.2 H   (0.0-0.0)  /100 WBCS


 


Sodium   136 L  (137-145)  mmol/L


 


Potassium   3.3 L  (3.5-5.1)  mmol/L


 


Chloride   93 L  ()  mmol/L


 


Carbon Dioxide   33 H  (22-30)  mmol/L


 


BUN   8 L  (9-20)  mg/dL


 


Glucose   106 H  (74-99)  mg/dL


 


Calcium   8.3 L  (8.4-10.2)  mg/dL








                      Microbiology - Last 24 Hours (Table)











 08/31/22 14:43 Blood Culture - Final





 Blood    No Growth after 144 hours


 


 08/31/22 14:43 Blood Culture - Final





 Blood    No Growth after 144 hours














Assessment and Plan


Plan: 





#Sepsis


#Perforated sigmoid diverticulitis with pneumoperitoneum with worsening 

leukocytosis


On Zosyn and Flagyl.  


Scheduled Tylenol and Toradol IV, Dilaudid IV as needed for pain management.  


Blood cultures are negative


IVF 


Surgery on board. 


Worsening leukocytosis d/w infectious disease will obtain CT abd and pelvis to 

follow up





#Hypokalemia


#Transaminitis


#Morbid obesity


Replace K and follow


Monitor LFTs

## 2022-09-10 NOTE — P.PN
Subjective


Progress Note Date: 09/10/22


Principal diagnosis: 





Abdominal pain.





He denies any significant pain.  He's had no significant output through his 

colostomy.








Objective





- Vital Signs


Vital signs: 


                                   Vital Signs











Temp  98.4 F   09/10/22 11:31


 


Pulse  81   09/10/22 11:31


 


Resp  16   09/10/22 11:31


 


BP  146/90   09/10/22 11:31


 


Pulse Ox  95   09/10/22 11:31


 


FiO2      








                                 Intake & Output











 09/09/22 09/10/22 09/10/22





 18:59 06:59 18:59


 


Intake Total 2300 1250 


 


Output Total 2840 1780 1340


 


Balance -540 -530 -1340


 


Intake:   


 


  Intake, IV Titration 2300 1250 





  Amount   


 


    ACETAMINOPHEN IV (For  200 





    ) 1,000 mg In Empty Bag 1   





    bag @ 400 mls/hr IVPB   





    Q6HR [facility] Rx#:[id number]   


 


    D5-0.45% NaCl with KCl 1660 950 





    20Meq/l 1,000 ml @ 80 mls   





    /hr IV .[id number] [facility] Rx#:   





    [id number]   


 


    Piperacillin-Tazobactam 3 200 100 





    .375 gm In Sodium   





    Chloride 0.9% 100 ml @ 25   





    mls/hr IVPB Q8H [facility] Rx#:   





    [id number]   


 


    Sodium Chloride 0.9% 1, 240  





    000 ml @ 0 mls/hr IV .[facility]   





    [facility] Rx#:[id number]   


 


Output:   


 


  Gastric Drainage 700 350 


 


  Drainage 140 80 40


 


    Right Abdomen 140 80 40


 


  Urine 2000 1350 1100


 


  Stool   100


 


  Oral Regurgitation   100


 


Other:   


 


  Voiding Method Indwelling Catheter Indwelling Catheter Indwelling Catheter


 


  # Voids 1  2














- Exam





Constitutional: No acute distress, conversant, pleasant


Eyes:Anicteric sclerae, moist conjunctiva, no lid-lag, PERRLA, 


ENMT: Oropharynx clear, no erythema, exudates


Neck: Supple, FROM, no masses, or JVD, No carotid bruits, No thyromegaly


Lungs: Clear to auscultation, Clear to percussion, Normal respiratory effort, no

accessory muscle use 


Cardiovascular: Heart regular in rate and rhythm, No murmurs, gallops, or rubs, 

No peripheral edema


Abdominal: Soft, diffuse abdominal tenderness, no guarding, rebound or rigidity,

Normoactive bowel sounds, No hepatomegaly, No splenomegaly, No palpable mass 


Skin: Normal temperature, tone, texture, turgor, no induration, No subcutaneous 

nodules, No rash, lesions, No ulcers


Extremities: No digital cyanosis, No clubbing, Pedal pulses intact and 

symmetrical, Radial pulses intact and symmetrical, No calf tenderness 


Psychiatric: Alert and oriented to person, place and time, appropriate affect, 

intact judgement         


Neuro: Muscles Strength 5/5 in all 4 extremities, Sensation to light touch 

grossly present throughout, Cranial nerves II-XII grossly intact, no focal 

sensory deficits








- Labs


CBC & Chem 7: 


                                 09/09/22 05:34





                                 09/09/22 05:34





Assessment and Plan


Plan: 





#Sepsis


#Perforated sigmoid diverticulitis with pneumoperitoneum with worsening 

leukocytosis


On Zosyn and Flagyl.  


Scheduled Tylenol and Toradol IV, Dilaudid IV as needed for pain management.  


Blood cultures are negative


IVF 


Surgery on board. 


Worsening leukocytosis prompted repeat CT abd and pelvis which showed worsening 

perforation of the sigmoid diverticulitis, 


Had Sigmoid colectomy with end colostomy on 9/7


Currently with NG tube and colostomy bag. 


Awaiting bowel function





Impaired perfusion to the right upper pole shown on CT


Seen by urology


No further management needed, view likely due to the extension of the infection 

into the retroperitoneal space. 





Resolved issues


#Hypokalemia


#Transaminitis





#Morbid obesity


Outpatient weight loss





#DVT prophylaxis


Lovenox





Anticipated discharge home in 3-4 days

## 2022-09-10 NOTE — P.PN
Subjective


Progress Note Date: 09/08/22


Principal diagnosis: 





Abdominal pain.





Due to worsening wbc repeat CT abdomen obtained and that showed worsening 

perforation of diverticulitis. He was taken to the OR for sigmoid colectomy with

end colostomy. Currently doing well, pain is controlled. 





Objective





- Vital Signs


Vital signs: 


                                   Vital Signs











Temp  98.3 F   09/10/22 03:45


 


Pulse  85   09/10/22 03:45


 


Resp  18   09/10/22 03:45


 


BP  128/85   09/10/22 03:45


 


Pulse Ox  95   09/10/22 03:45


 


FiO2      








                                 Intake & Output











 09/09/22 09/10/22 09/10/22





 18:59 06:59 18:59


 


Intake Total 2300 1250 


 


Output Total 2840 1780 1340


 


Balance -540 -530 -1340


 


Intake:   


 


  Intake, IV Titration 2300 1250 





  Amount   


 


    ACETAMINOPHEN IV (For  200 





    ) 1,000 mg In Empty Bag 1   





    bag @ 400 mls/hr IVPB   





    Q6HR [facility] Rx#:[id number]   


 


    D5-0.45% NaCl with KCl 1660 950 





    20Meq/l 1,000 ml @ 80 mls   





    /hr IV .[id number] [facility] Rx#:   





    [id number]   


 


    Piperacillin-Tazobactam 3 200 100 





    .375 gm In Sodium   





    Chloride 0.9% 100 ml @ 25   





    mls/hr IVPB Q8H [facility] Rx#:   





    [id number]   


 


    Sodium Chloride 0.9% 1, 240  





    000 ml @ 0 mls/hr IV .[facility]   





    -[facility] Rx#:[id number]   


 


Output:   


 


  Gastric Drainage 700 350 


 


  Drainage 140 80 40


 


    Right Abdomen 140 80 40


 


  Urine 2000 1350 1100


 


  Stool   100


 


  Oral Regurgitation   100


 


Other:   


 


  Voiding Method Indwelling Catheter Indwelling Catheter 


 


  # Voids 1  2














- Exam





Constitutional: No acute distress, conversant, pleasant


Eyes:Anicteric sclerae, moist conjunctiva, no lid-lag, PERRLA, 


ENMT: Oropharynx clear, no erythema, exudates


Neck: Supple, FROM, no masses, or JVD, No carotid bruits, No thyromegaly


Lungs: Clear to auscultation, Clear to percussion, Normal respiratory effort, no

accessory muscle use 


Cardiovascular: Heart regular in rate and rhythm, No murmurs, gallops, or rubs, 

No peripheral edema


Abdominal: Soft, diffuse abdominal tenderness, no guarding, rebound or rigidity,

Normoactive bowel sounds, No hepatomegaly, No splenomegaly, No palpable mass 


Skin: Normal temperature, tone, texture, turgor, no induration, No subcutaneous 

nodules, No rash, lesions, No ulcers


Extremities: No digital cyanosis, No clubbing, Pedal pulses intact and 

symmetrical, Radial pulses intact and symmetrical, No calf tenderness 


Psychiatric: Alert and oriented to person, place and time, appropriate affect, 

intact judgement         


Neuro: Muscles Strength 5/5 in all 4 extremities, Sensation to light touch 

grossly present throughout, Cranial nerves II-XII grossly intact, no focal 

sensory deficits








- Labs


CBC & Chem 7: 


                                 09/09/22 05:34





                                 09/09/22 05:34





Assessment and Plan


Plan: 





#Sepsis


#Perforated sigmoid diverticulitis with pneumoperitoneum with worsening 

leukocytosis


On Zosyn and Flagyl.  


Scheduled Tylenol and Toradol IV, Dilaudid IV as needed for pain management.  


Blood cultures are negative


IVF 


Surgery on board. 


Worsening leukocytosis prompted repeat CT abd and pelvis which showed worsening 

perforation of the sigmoid diverticulitis, 


Had Sigmoid colectomy with end colostomy on 9/7


Currently with NG tube and colostomy bag. 


Awaiting bowel function





Impaired perfusion to the right upper pole shown on CT


Seen by urology


No further management needed, view likely due to the extension of the infection 

into the retroperitoneal space. 





Resolved issues


#Hypokalemia


#Transaminitis





#Morbid obesity


Outpatient weight loss





#DVT prophylaxis


Lovenox





Anticipated discharge home in 3-4 days

## 2022-09-10 NOTE — P.PN
Subjective


Progress Note Date: 09/09/22


Principal diagnosis: 





Abdominal pain.





He states that pain is controlled. Not passing gas yet. No nausea, NG tube is 

in. 





Objective





- Vital Signs


Vital signs: 


                                   Vital Signs











Temp  98.3 F   09/10/22 03:45


 


Pulse  85   09/10/22 03:45


 


Resp  18   09/10/22 03:45


 


BP  128/85   09/10/22 03:45


 


Pulse Ox  95   09/10/22 03:45


 


FiO2      








                                 Intake & Output











 09/09/22 09/10/22 09/10/22





 18:59 06:59 18:59


 


Intake Total 2300 1250 


 


Output Total 2840 1780 1340


 


Balance -540 -530 -1340


 


Intake:   


 


  Intake, IV Titration 2300 1250 





  Amount   


 


    ACETAMINOPHEN IV (For  200 





    ) 1,000 mg In Empty Bag 1   





    bag @ 400 mls/hr IVPB   





    Q6HR [facility] Rx#:[id number]   


 


    D5-0.45% NaCl with KCl 1660 950 





    20Meq/l 1,000 ml @ 80 mls   





    /hr IV .[id number] [facility] Rx#:   





    [id number]   


 


    Piperacillin-Tazobactam 3 200 100 





    .375 gm In Sodium   





    Chloride 0.9% 100 ml @ 25   





    mls/hr IVPB Q8H [facility] Rx#:   





    [id number]   


 


    Sodium Chloride 0.9% 1, 240  





    000 ml @ 0 mls/hr IV .[facility]   





    -[facility] Rx#:[id number]   


 


Output:   


 


  Gastric Drainage 700 350 


 


  Drainage 140 80 40


 


    Right Abdomen 140 80 40


 


  Urine 2000 1350 1100


 


  Stool   100


 


  Oral Regurgitation   100


 


Other:   


 


  Voiding Method Indwelling Catheter Indwelling Catheter 


 


  # Voids 1  2














- Exam





Constitutional: No acute distress, conversant, pleasant


Eyes:Anicteric sclerae, moist conjunctiva, no lid-lag, PERRLA, 


ENMT: Oropharynx clear, no erythema, exudates


Neck: Supple, FROM, no masses, or JVD, No carotid bruits, No thyromegaly


Lungs: Clear to auscultation, Clear to percussion, Normal respiratory effort, no

accessory muscle use 


Cardiovascular: Heart regular in rate and rhythm, No murmurs, gallops, or rubs, 

No peripheral edema


Abdominal: Soft, diffuse abdominal tenderness, no guarding, rebound or rigidity,

Normoactive bowel sounds, No hepatomegaly, No splenomegaly, No palpable mass 


Skin: Normal temperature, tone, texture, turgor, no induration, No subcutaneous 

nodules, No rash, lesions, No ulcers


Extremities: No digital cyanosis, No clubbing, Pedal pulses intact and 

symmetrical, Radial pulses intact and symmetrical, No calf tenderness 


Psychiatric: Alert and oriented to person, place and time, appropriate affect, 

intact judgement         


Neuro: Muscles Strength 5/5 in all 4 extremities, Sensation to light touch 

grossly present throughout, Cranial nerves II-XII grossly intact, no focal 

sensory deficits








- Labs


CBC & Chem 7: 


                                 09/09/22 05:34





                                 09/09/22 05:34





Assessment and Plan


Plan: 





#Sepsis


#Perforated sigmoid diverticulitis with pneumoperitoneum with worsening 

leukocytosis


On Zosyn and Flagyl.  


Scheduled Tylenol and Toradol IV, Dilaudid IV as needed for pain management.  


Blood cultures are negative


IVF 


Surgery on board. 


Worsening leukocytosis prompted repeat CT abd and pelvis which showed worsening 

perforation of the sigmoid diverticulitis, 


Had Sigmoid colectomy with end colostomy on 9/7


Currently with NG tube and colostomy bag. 


Awaiting bowel function





Impaired perfusion to the right upper pole shown on CT


Seen by urology


No further management needed, view likely due to the extension of the infection 

into the retroperitoneal space. 





Resolved issues


#Hypokalemia


#Transaminitis





#Morbid obesity


Outpatient weight loss





#DVT prophylaxis


Lovenox





Anticipated discharge home in 3-4 days

## 2022-09-11 LAB
ALBUMIN SERPL-MCNC: 3.5 G/DL (ref 3.8–4.9)
ALBUMIN/GLOB SERPL: 1.35 G/DL (ref 1.6–3.17)
ALP SERPL-CCNC: 74 U/L (ref 41–126)
ALT SERPL-CCNC: 32 U/L (ref 10–49)
ANION GAP SERPL CALC-SCNC: 7.3 MMOL/L (ref 10–18)
AST SERPL-CCNC: 32 U/L (ref 14–35)
BASOPHILS # BLD AUTO: 0.12 X 10*3/UL (ref 0–0.1)
BASOPHILS NFR BLD AUTO: 0.7 %
BUN SERPL-SCNC: 11.8 MG/DL (ref 9–27)
BUN/CREAT SERPL: 13.11 RATIO (ref 12–20)
CALCIUM SPEC-MCNC: 8.8 MG/DL (ref 8.7–10.3)
CHLORIDE SERPL-SCNC: 98 MMOL/L (ref 96–109)
CO2 SERPL-SCNC: 29.7 MMOL/L (ref 20–27.5)
EOSINOPHIL # BLD AUTO: 0.36 X 10*3/UL (ref 0.04–0.35)
EOSINOPHIL NFR BLD AUTO: 2.1 %
ERYTHROCYTE [DISTWIDTH] IN BLOOD BY AUTOMATED COUNT: 4.25 X 10*6/UL (ref 4.4–5.6)
ERYTHROCYTE [DISTWIDTH] IN BLOOD: 13.3 % (ref 11.5–14.5)
GLOBULIN SER CALC-MCNC: 2.6 G/DL (ref 1.6–3.3)
GLUCOSE BLD-MCNC: 111 MG/DL (ref 70–110)
GLUCOSE BLD-MCNC: 117 MG/DL (ref 70–110)
GLUCOSE BLD-MCNC: 158 MG/DL (ref 70–110)
GLUCOSE SERPL-MCNC: 121 MG/DL (ref 70–110)
HCT VFR BLD AUTO: 38.8 % (ref 39.6–50)
HGB BLD-MCNC: 13 G/DL (ref 13–17)
IMM GRANULOCYTES BLD QL AUTO: 2.2 %
LYMPHOCYTES # SPEC AUTO: 1.53 X 10*3/UL (ref 0.9–5)
LYMPHOCYTES NFR SPEC AUTO: 9 %
MAGNESIUM SPEC-SCNC: 2.1 MG/DL (ref 1.5–2.4)
MCH RBC QN AUTO: 30.6 PG (ref 27–32)
MCHC RBC AUTO-ENTMCNC: 33.5 G/DL (ref 32–37)
MCV RBC AUTO: 91.3 FL (ref 80–97)
MONOCYTES # BLD AUTO: 0.82 X 10*3/UL (ref 0.2–1)
MONOCYTES NFR BLD AUTO: 4.8 %
NEUTROPHILS # BLD AUTO: 13.82 X 10*3/UL (ref 1.8–7.7)
NEUTROPHILS NFR BLD AUTO: 81.2 %
NRBC BLD AUTO-RTO: 0 /100 WBCS (ref 0–0)
PLATELET # BLD AUTO: 563 X 10*3/UL (ref 140–440)
POTASSIUM SERPL-SCNC: 4 MMOL/L (ref 3.5–5.5)
PROT SERPL-MCNC: 6.1 G/DL (ref 6.2–8.2)
SODIUM SERPL-SCNC: 135 MMOL/L (ref 135–145)
TRIGL SERPL-MCNC: 197 MG/DL (ref 0–149)
WBC # BLD AUTO: 17.02 X 10*3/UL (ref 4.5–10)

## 2022-09-11 RX ADMIN — PIPERACILLIN AND TAZOBACTAM SCH MLS/HR: 3; .375 INJECTION, POWDER, FOR SOLUTION INTRAVENOUS at 19:17

## 2022-09-11 RX ADMIN — PANTOPRAZOLE SODIUM SCH MG: 40 INJECTION, POWDER, FOR SOLUTION INTRAVENOUS at 08:13

## 2022-09-11 RX ADMIN — ONDANSETRON PRN MG: 2 INJECTION INTRAMUSCULAR; INTRAVENOUS at 08:14

## 2022-09-11 RX ADMIN — HYDROMORPHONE HYDROCHLORIDE PRN MG: 1 INJECTION, SOLUTION INTRAMUSCULAR; INTRAVENOUS; SUBCUTANEOUS at 04:08

## 2022-09-11 RX ADMIN — SIMETHICONE SCH: 20 SUSPENSION/ DROPS ORAL at 08:13

## 2022-09-11 RX ADMIN — LEUCINE, PHENYLALANINE, LYSINE, METHIONINE, ISOLEUCINE, VALINE, HISTIDINE, THREONINE, TRYPTOPHAN, ALANINE, GLYCINE, ARGININE, PROLINE, SERINE, TYROSINE, SODIUM ACETATE, DIBASIC POTASSIUM PHOSPHATE, MAGNESIUM CHLORIDE, SODIUM CHLORIDE, CALCIUM CHLORIDE, DEXTROSE SCH MLS/HR
365; 280; 290; 200; 300; 290; 240; 210; 90; 1035; 515; 575; 340; 250; 20; 340; 261; 51; 59; 33; 20 INJECTION INTRAVENOUS at 21:59

## 2022-09-11 RX ADMIN — PIPERACILLIN AND TAZOBACTAM SCH MLS/HR: 3; .375 INJECTION, POWDER, FOR SOLUTION INTRAVENOUS at 12:07

## 2022-09-11 RX ADMIN — SIMETHICONE SCH: 20 SUSPENSION/ DROPS ORAL at 21:09

## 2022-09-11 RX ADMIN — SIMETHICONE SCH: 20 SUSPENSION/ DROPS ORAL at 17:11

## 2022-09-11 RX ADMIN — HYDROMORPHONE HYDROCHLORIDE PRN MG: 1 INJECTION, SOLUTION INTRAMUSCULAR; INTRAVENOUS; SUBCUTANEOUS at 08:14

## 2022-09-11 RX ADMIN — HYDROMORPHONE HYDROCHLORIDE PRN MG: 1 INJECTION, SOLUTION INTRAMUSCULAR; INTRAVENOUS; SUBCUTANEOUS at 21:06

## 2022-09-11 RX ADMIN — PIPERACILLIN AND TAZOBACTAM SCH MLS/HR: 3; .375 INJECTION, POWDER, FOR SOLUTION INTRAVENOUS at 04:07

## 2022-09-11 RX ADMIN — SIMETHICONE SCH: 20 SUSPENSION/ DROPS ORAL at 13:21

## 2022-09-11 RX ADMIN — DOCUSATE SODIUM SCH MG: 100 CAPSULE, LIQUID FILLED ORAL at 19:18

## 2022-09-11 RX ADMIN — DEXTROSE MONOHYDRATE, SODIUM CHLORIDE, AND POTASSIUM CHLORIDE SCH: 50; 4.5; 1.49 INJECTION, SOLUTION INTRAVENOUS at 03:36

## 2022-09-11 RX ADMIN — ENOXAPARIN SODIUM SCH MG: 40 INJECTION SUBCUTANEOUS at 08:13

## 2022-09-11 RX ADMIN — DOCUSATE SODIUM SCH: 100 CAPSULE, LIQUID FILLED ORAL at 08:13

## 2022-09-11 RX ADMIN — CEFAZOLIN SCH: 330 INJECTION, POWDER, FOR SOLUTION INTRAMUSCULAR; INTRAVENOUS at 17:10

## 2022-09-11 RX ADMIN — HYDROMORPHONE HYDROCHLORIDE PRN MG: 1 INJECTION, SOLUTION INTRAMUSCULAR; INTRAVENOUS; SUBCUTANEOUS at 00:18

## 2022-09-11 RX ADMIN — HYDROMORPHONE HYDROCHLORIDE PRN MG: 1 INJECTION, SOLUTION INTRAMUSCULAR; INTRAVENOUS; SUBCUTANEOUS at 06:06

## 2022-09-11 RX ADMIN — DEXTROSE MONOHYDRATE, SODIUM CHLORIDE, AND POTASSIUM CHLORIDE SCH: 50; 4.5; 1.49 INJECTION, SOLUTION INTRAVENOUS at 13:21

## 2022-09-11 RX ADMIN — HYDROMORPHONE HYDROCHLORIDE PRN MG: 1 INJECTION, SOLUTION INTRAMUSCULAR; INTRAVENOUS; SUBCUTANEOUS at 12:08

## 2022-09-11 RX ADMIN — HYDROMORPHONE HYDROCHLORIDE PRN MG: 1 INJECTION, SOLUTION INTRAMUSCULAR; INTRAVENOUS; SUBCUTANEOUS at 01:43

## 2022-09-11 NOTE — P.PN
Subjective


Progress Note Date: 09/11/22


Principal diagnosis: 





Abdominal pain.





Patient states that he had a rough night last night because of worsening 

abdominal pain, he was treated with IV pain medications, his pain currently 

under control.  He told me that he is starting to pass some flatus. No nausea or

vomiting. No fevers.





Objective





- Vital Signs


Vital signs: 


                                   Vital Signs











Temp  98.3 F   09/11/22 04:13


 


Pulse  89   09/11/22 04:13


 


Resp  18   09/11/22 04:13


 


BP  132/88   09/11/22 04:13


 


Pulse Ox  94 L  09/11/22 04:13


 


FiO2      








                                 Intake & Output











 09/10/22 09/11/22 09/11/22





 18:59 06:59 18:59


 


Intake Total 960 580 


 


Output Total 1380 1490 


 


Balance -420 -910 


 


Weight 138.346 kg  


 


Intake:   


 


  Intake, IV Titration 960 580 





  Amount   


 


    D5-0.45% NaCl with KCl 960  





    20Meq/l 1,000 ml @ 80 mls   





    /hr IV .[id number] [facility] Rx#:   





    [id number]   


 


    Lactated Ringers 1,000 ml  240 





    @ 0 mls/hr IV .K-MED   





    [facility] Rx#:[id number]   


 


    Mvi, Adult No.4 with Vit  240 





    K 10 ml Trace (Conc-1Ml/   





    Dose) 1 ml In Amino Acid   





    5%-D20w+Lytes*E* 1,000 ml   





    @ 30 mls/hr IV .Q24H [facility]   





    Rx#:[id number]   


 


    Piperacillin-Tazobactam 3  100 





    .375 gm In Sodium   





    Chloride 0.9% 100 ml @ 25   





    mls/hr IVPB Q8H [facility] Rx#:   





    [id number]   


 


Output:   


 


  Gastric Drainage  650 


 


  Drainage 80 15 


 


    Right Abdomen 80 15 


 


  Urine 1100 725 


 


  Stool 100 100 


 


  Oral Regurgitation 100  


 


Other:   


 


  Voiding Method Indwelling Catheter Indwelling Catheter Urinal


 


  # Voids 6  














- Exam





Constitutional: No acute distress, conversant, pleasant


Eyes:Anicteric sclerae, moist conjunctiva, no lid-lag, PERRLA, 


ENMT: Oropharynx clear, no erythema, exudates


Neck: Supple, FROM, no masses, or JVD, No carotid bruits, No thyromegaly


Lungs: Clear to auscultation, Clear to percussion, Normal respiratory effort, no

accessory muscle use 


Cardiovascular: Heart regular in rate and rhythm, No murmurs, gallops, or rubs, 

No peripheral edema


Abdominal: Soft, diffuse abdominal tenderness, no guarding, rebound or rigidity,

Normoactive bowel sounds, No hepatomegaly, No splenomegaly, No palpable mass 


Skin: Normal temperature, tone, texture, turgor, no induration, No subcutaneous 

nodules, No rash, lesions, No ulcers


Extremities: No digital cyanosis, No clubbing, Pedal pulses intact and 

symmetrical, Radial pulses intact and symmetrical, No calf tenderness 


Psychiatric: Alert and oriented to person, place and time, appropriate affect, 

intact judgement         


Neuro: Muscles Strength 5/5 in all 4 extremities, Sensation to light touch 

grossly present throughout, Cranial nerves II-XII grossly intact, no focal 

sensory deficits








- Labs


CBC & Chem 7: 


                                 09/11/22 06:06





                                 09/11/22 06:06


Labs: 


                  Abnormal Lab Results - Last 24 Hours (Table)











  09/10/22 09/11/22 09/11/22 Range/Units





  16:26 06:06 06:06 


 


WBC    17.02 H  (4.50-10.00)  X 10*3/uL


 


RBC    4.25 L  (4.40-5.60)  X 10*6/uL


 


Hct    38.8 L  (39.6-50.0)  %


 


Plt Count    563 H  (140-440)  X 10*3/uL


 


Immature Gran #    0.37 H  (0.00-0.04)  X 10*3/uL


 


Neutrophils #    13.82 H  (1.80-7.70)  X 10*3/uL


 


Eosinophils #    0.36 H  (0.04-0.35)  X 10*3/uL


 


Basophils #    0.12 H  (0.00-0.10)  X 10*3/uL


 


Sodium  136 L    (137-145)  mmol/L


 


Chloride  97 L    ()  mmol/L


 


Carbon Dioxide   29.7 H   (20.0-27.5)  mmol/L


 


Anion Gap   7.30 L   (10.00-18.00)  mmol/L


 


Glucose  104 H  121 H   (74-99)  mg/dL


 


Total Protein   6.1 L   (6.2-8.2)  g/dL


 


Albumin   3.5 L   (3.8-4.9)  g/dL


 


Albumin/Globulin Ratio   1.35 L   (1.60-3.17)  g/dL


 


Triglycerides  197.00 H    (0..00)  mg/dL














Assessment and Plan


Plan: 





#Sepsis


#Perforated sigmoid diverticulitis with pneumoperitoneum with worsening 

leukocytosis


On Zosyn   


Scheduled Tylenol and Toradol IV, Dilaudid IV as needed for pain management.  


Blood cultures are negative


IVF 


Surgery on board. 


Worsening leukocytosis prompted repeat CT abd and pelvis which showed worsening 

perforation of the sigmoid diverticulitis, 


Had Sigmoid colectomy with end colostomy on 9/7


Post op NG tube and colostomy bag were placed. 


Bowel function seems to be starting to come back, NG to be removed today 

according to surgery, start clears.





Impaired perfusion to the right upper pole shown on CT


Seen by urology


No further management needed, view likely due to the extension of the infection 

into the retroperitoneal space. 





Resolved issues


#Hypokalemia


#Transaminitis





#Morbid obesity


Outpatient weight loss





#DVT prophylaxis


Lovenox





#Weakness


PT/OT





Anticipated discharge home in 2-3 days

## 2022-09-11 NOTE — P.PN
Progress Note - Text


Progress Note Date: 09/11/22





Patient feels better today.  He's had some output through his colostomy.





On exam vital signs are stable.  Abdomen soft.  There is liquid brown stools 

colostomy.  Patient will have his nasogastric tube removed.  We'll start on 

clear liquids.

## 2022-09-11 NOTE — P.PN
Subjective


Progress Note Date: 09/03/22


Principal diagnosis: 


Acute complicated diverticulitis





Patient is a 41-year-old  male presented to the hospital with abdominal

pain with a recent diagnosis of diverticulitis failing outpatient oral Augmentin

therapy with repeat CAT scan shows perforation.  Patient was taken to the OR 

09/07/2022 status post laparotomy and diverting colostomy


 on today's evaluation that is 09/10/2022, the patient remains to be afebrile, 

the patient abdominal pain is controlled with the current medication, patient 

denies nausea, vomiting no chest pain shortness of breath or cough





Objective





- Vital Signs


Vital signs: 


                                   Vital Signs











Temp  98.4 F   09/10/22 11:31


 


Pulse  81   09/10/22 11:31


 


Resp  16   09/10/22 11:31


 


BP  146/90   09/10/22 11:31


 


Pulse Ox  95   09/10/22 11:31


 


FiO2      








                                 Intake & Output











 09/09/22 09/10/22 09/10/22





 18:59 06:59 18:59


 


Intake Total 2300 1250 


 


Output Total 2840 1780 1340


 


Balance -540 -530 -1340


 


Intake:   


 


  Intake, IV Titration 2300 1250 





  Amount   


 


    ACETAMINOPHEN IV (For  200 





    ) 1,000 mg In Empty Bag 1   





    bag @ 400 mls/hr IVPB   





    Q6HR [facility] Rx#:[id number]   


 


    D5-0.45% NaCl with KCl 1660 950 





    20Meq/l 1,000 ml @ 80 mls   





    /hr IV .[id number] [facility] Rx#:   





    [id number]   


 


    Piperacillin-Tazobactam 3 200 100 





    .375 gm In Sodium   





    Chloride 0.9% 100 ml @ 25   





    mls/hr IVPB Q8H [facility] Rx#:   





    [id number]   


 


    Sodium Chloride 0.9% 1, 240  





    000 ml @ 0 mls/hr IV .STK   





    -[facility] Rx#:[id number]   


 


Output:   


 


  Gastric Drainage 700 350 


 


  Drainage 140 80 40


 


    Right Abdomen 140 80 40


 


  Urine 2000 1350 1100


 


  Stool   100


 


  Oral Regurgitation   100


 


Other:   


 


  Voiding Method Indwelling Catheter Indwelling Catheter Indwelling Catheter


 


  # Voids 1  2














- Exam


GENERAL DESCRIPTION: Middle-aged male lying in bed in no distress





RESPIRATORY SYSTEM: Unlabored breathing , decreased breath sounds at bases





HEART: S1 S2 regular rate and rhythm ,





ABDOMEN: Soft , no tenderness





EXTREMITIES: No edema feet








- Labs


CBC & Chem 7: 


                                 09/09/22 05:34





                                 09/10/22 16:26





Assessment and Plan


(1) Acute diverticulitis


Current Visit: Yes   Status: Acute   Code(s): K57.92 - DVTRCLI OF INTEST, PART 

UNSP, W/O PERF OR ABSCESS W/O BLEED   SNOMED Code(s): 557737313


   





(2) Failure of outpatient treatment


Current Visit: Yes   Status: Acute   Code(s): Z78.9 - OTHER SPECIFIED HEALTH 

STATUS   SNOMED Code(s): 064906043


   


Plan: 


1patient presented to hospital with sepsis in this patient did have a fever 

tachycardia elevated white count source is acute complicated diverticulitis with

perforation and will need to cover for the enteric gram-negative both aerobes 

and anaerobes.


2patient seemed to have failed medical therapy in this patient who is status p

ost laparotomy and diverting colostomy, no OR culture were done


3-patient seemed to showing clinical improvement and white count is trending 

down, the patient will continue with the Zosyn and will monitor clinical course 

closely


Time with Patient: Less than 30

## 2022-09-11 NOTE — P.PN
Subjective


Progress Note Date: 09/09/22


Principal diagnosis: 


Acute complicated diverticulitis





Patient is a 41-year-old  male presented to the hospital with abdominal

pain with a recent diagnosis of diverticulitis failing outpatient oral Augmentin

therapy with repeat CAT scan shows perforation.  Patient was taken to the OR 

09/07/2022 status post laparotomy and diverting colostomy


 on today's evaluation that is 09/09/2022, the patient continues to be afebrile,

the patient abdominal pain is controlled with the current medication, patient 

did have some nausea but no vomiting no chest pain shortness of breath or cough





Objective





- Vital Signs


Vital signs: 


                                   Vital Signs











Temp  98.1 F   09/09/22 11:18


 


Pulse  81   09/09/22 11:18


 


Resp  16   09/09/22 11:18


 


BP  130/89   09/09/22 11:18


 


Pulse Ox  94 L  09/09/22 11:18


 


FiO2      








                                 Intake & Output











 09/08/22 09/09/22 09/09/22





 18:59 06:59 18:59


 


Intake Total  2000 


 


Output Total 1350 1300 1770


 


Balance -1350 700 -1770


 


Intake:   


 


  Intake, IV Titration  2000 





  Amount   


 


    ACETAMINOPHEN IV (For NPO  200 





    ) 1,000 mg In Empty Bag 1   





    bag @ 400 mls/hr IVPB   





    Q6HR [facility] Rx#:[id number]   


 


    D5-0.45% NaCl with KCl  1700 





    20Meq/l 1,000 ml @ 150   





    mls/hr IV .Q6H40M [facility] Rx#   





    [id number]   


 


    Piperacillin-Tazobactam 3  100 





    .375 gm In Sodium   





    Chloride 0.9% 100 ml @ 25   





    mls/hr IVPB Q8H [facility] Rx#:   





    [id number]   


 


Output:   


 


  Gastric Drainage   450


 


  Drainage 100  120


 


    Right Abdomen 100  120


 


  Urine 1250 1300 1200


 


Other:   


 


  Voiding Method Indwelling Catheter Indwelling Catheter 


 


  # Voids   1














- Exam


GENERAL DESCRIPTION: Middle-aged male lying in bed in no distress





RESPIRATORY SYSTEM: Unlabored breathing , decreased breath sounds at bases





HEART: S1 S2 regular rate and rhythm ,





ABDOMEN: Soft , mild  tenderness





EXTREMITIES: No edema feet








- Labs


CBC & Chem 7: 


                                 09/09/22 05:34





                                 09/10/22 16:26


Labs: 


                  Abnormal Lab Results - Last 24 Hours (Table)











  09/09/22 09/09/22 Range/Units





  05:34 05:34 


 


WBC  17.87 H   (4.50-10.00)  X 10*3/uL


 


RBC  4.11 L   (4.40-5.60)  X 10*6/uL


 


Hgb  12.7 L   (13.0-17.0)  g/dL


 


Hct  38.5 L   (39.6-50.0)  %


 


Plt Count  477 H   (140-440)  X 10*3/uL


 


Immature Gran #  0.50 H   (0.00-0.04)  X 10*3/uL


 


Neutrophils #  14.14 H   (1.80-7.70)  X 10*3/uL


 


Monocytes #  1.07 H   (0.20-1.00)  X 10*3/uL


 


Carbon Dioxide   31.0 H  (20.0-27.5)  mmol/L


 


Anion Gap   8.00 L  (10.00-18.00)  mmol/L


 


BUN/Creatinine Ratio   11.50 L  (12.00-20.00)  Ratio


 


Glucose   124 H  ()  mg/dL


 


Calcium   8.1 L  (8.7-10.3)  mg/dL














Assessment and Plan


(1) Acute diverticulitis


Current Visit: Yes   Status: Acute   Code(s): K57.92 - DVTRCLI OF INTEST, PART 

UNSP, W/O PERF OR ABSCESS W/O BLEED   SNOMED Code(s): 565363502


   





(2) Failure of outpatient treatment


Current Visit: Yes   Status: Acute   Code(s): Z78.9 - OTHER SPECIFIED HEALTH 

STATUS   SNOMED Code(s): 985822634


   


Plan: 


1patient presented to hospital with sepsis in this patient did have a fever 

tachycardia elevated white count source is acute complicated diverticulitis with

perforation and will need to cover for the enteric gram-negative both aerobes 

and anaerobes.


2patient seemed to have failed medical therapy in this patient who is status 

post laparotomy and diverting colostomy, no OR culture were done


3-patient seemed to showing clinical improvement and will continue with the 

Zosyn and will monitor clinical course closely


Time with Patient: Less than 30

## 2022-09-12 LAB
ALBUMIN SERPL-MCNC: 3.6 G/DL (ref 3.8–4.9)
ALBUMIN/GLOB SERPL: 1.38 G/DL (ref 1.6–3.17)
ALP SERPL-CCNC: 70 U/L (ref 41–126)
ALT SERPL-CCNC: 30 U/L (ref 10–49)
ANION GAP SERPL CALC-SCNC: 12.5 MMOL/L (ref 10–18)
AST SERPL-CCNC: 31 U/L (ref 14–35)
BASOPHILS # BLD AUTO: 0.13 X 10*3/UL (ref 0–0.1)
BASOPHILS NFR BLD AUTO: 0.8 %
BUN SERPL-SCNC: 12.6 MG/DL (ref 9–27)
BUN/CREAT SERPL: 14 RATIO (ref 12–20)
CALCIUM SPEC-MCNC: 8.9 MG/DL (ref 8.7–10.3)
CHLORIDE SERPL-SCNC: 98 MMOL/L (ref 96–109)
CO2 SERPL-SCNC: 26.5 MMOL/L (ref 20–27.5)
EOSINOPHIL # BLD AUTO: 0.39 X 10*3/UL (ref 0.04–0.35)
EOSINOPHIL NFR BLD AUTO: 2.3 %
ERYTHROCYTE [DISTWIDTH] IN BLOOD BY AUTOMATED COUNT: 4.21 X 10*6/UL (ref 4.4–5.6)
ERYTHROCYTE [DISTWIDTH] IN BLOOD: 13.3 % (ref 11.5–14.5)
GLOBULIN SER CALC-MCNC: 2.6 G/DL (ref 1.6–3.3)
GLUCOSE BLD-MCNC: 117 MG/DL (ref 70–110)
GLUCOSE BLD-MCNC: 129 MG/DL (ref 70–110)
GLUCOSE BLD-MCNC: 142 MG/DL (ref 70–110)
GLUCOSE BLD-MCNC: 144 MG/DL (ref 70–110)
GLUCOSE SERPL-MCNC: 151 MG/DL (ref 70–110)
HCT VFR BLD AUTO: 38.9 % (ref 39.6–50)
HGB BLD-MCNC: 13.1 G/DL (ref 13–17)
IMM GRANULOCYTES BLD QL AUTO: 2.5 %
LYMPHOCYTES # SPEC AUTO: 1.39 X 10*3/UL (ref 0.9–5)
LYMPHOCYTES NFR SPEC AUTO: 8 %
MAGNESIUM SPEC-SCNC: 2.4 MG/DL (ref 1.5–2.4)
MCH RBC QN AUTO: 31.1 PG (ref 27–32)
MCHC RBC AUTO-ENTMCNC: 33.7 G/DL (ref 32–37)
MCV RBC AUTO: 92.4 FL (ref 80–97)
MONOCYTES # BLD AUTO: 0.88 X 10*3/UL (ref 0.2–1)
MONOCYTES NFR BLD AUTO: 5.1 %
NEUTROPHILS # BLD AUTO: 14.11 X 10*3/UL (ref 1.8–7.7)
NEUTROPHILS NFR BLD AUTO: 81.3 %
NRBC BLD AUTO-RTO: 0 /100 WBCS (ref 0–0)
PLATELET # BLD AUTO: 544 X 10*3/UL (ref 140–440)
POTASSIUM SERPL-SCNC: 4.1 MMOL/L (ref 3.5–5.5)
PROT SERPL-MCNC: 6.2 G/DL (ref 6.2–8.2)
SODIUM SERPL-SCNC: 137 MMOL/L (ref 135–145)
WBC # BLD AUTO: 17.33 X 10*3/UL (ref 4.5–10)

## 2022-09-12 RX ADMIN — DOCUSATE SODIUM SCH MG: 100 CAPSULE, LIQUID FILLED ORAL at 09:09

## 2022-09-12 RX ADMIN — SIMETHICONE SCH: 20 SUSPENSION/ DROPS ORAL at 20:21

## 2022-09-12 RX ADMIN — HYDROMORPHONE HYDROCHLORIDE PRN MG: 1 INJECTION, SOLUTION INTRAMUSCULAR; INTRAVENOUS; SUBCUTANEOUS at 19:31

## 2022-09-12 RX ADMIN — LEUCINE, PHENYLALANINE, LYSINE, METHIONINE, ISOLEUCINE, VALINE, HISTIDINE, THREONINE, TRYPTOPHAN, ALANINE, GLYCINE, ARGININE, PROLINE, SERINE, TYROSINE, SODIUM ACETATE, DIBASIC POTASSIUM PHOSPHATE, MAGNESIUM CHLORIDE, SODIUM CHLORIDE, CALCIUM CHLORIDE, DEXTROSE SCH MLS/HR
365; 280; 290; 200; 300; 290; 240; 210; 90; 1035; 515; 575; 340; 250; 20; 340; 261; 51; 59; 33; 20 INJECTION INTRAVENOUS at 19:32

## 2022-09-12 RX ADMIN — HYDROMORPHONE HYDROCHLORIDE PRN MG: 1 INJECTION, SOLUTION INTRAMUSCULAR; INTRAVENOUS; SUBCUTANEOUS at 06:06

## 2022-09-12 RX ADMIN — HYDROMORPHONE HYDROCHLORIDE PRN MG: 1 INJECTION, SOLUTION INTRAMUSCULAR; INTRAVENOUS; SUBCUTANEOUS at 00:03

## 2022-09-12 RX ADMIN — PIPERACILLIN AND TAZOBACTAM SCH MLS/HR: 3; .375 INJECTION, POWDER, FOR SOLUTION INTRAVENOUS at 19:30

## 2022-09-12 RX ADMIN — HYDROCODONE BITARTRATE AND ACETAMINOPHEN PRN EACH: 5; 325 TABLET ORAL at 17:55

## 2022-09-12 RX ADMIN — DOCUSATE SODIUM SCH MG: 100 CAPSULE, LIQUID FILLED ORAL at 20:25

## 2022-09-12 RX ADMIN — PIPERACILLIN AND TAZOBACTAM SCH MLS/HR: 3; .375 INJECTION, POWDER, FOR SOLUTION INTRAVENOUS at 12:37

## 2022-09-12 RX ADMIN — PIPERACILLIN AND TAZOBACTAM SCH MLS/HR: 3; .375 INJECTION, POWDER, FOR SOLUTION INTRAVENOUS at 03:07

## 2022-09-12 RX ADMIN — LEUCINE, PHENYLALANINE, LYSINE, METHIONINE, ISOLEUCINE, VALINE, HISTIDINE, THREONINE, TRYPTOPHAN, ALANINE, GLYCINE, ARGININE, PROLINE, SERINE, TYROSINE, SODIUM ACETATE, DIBASIC POTASSIUM PHOSPHATE, MAGNESIUM CHLORIDE, SODIUM CHLORIDE, CALCIUM CHLORIDE, DEXTROSE SCH MLS/HR
365; 280; 290; 200; 300; 290; 240; 210; 90; 1035; 515; 575; 340; 250; 20; 340; 261; 51; 59; 33; 20 INJECTION INTRAVENOUS at 09:09

## 2022-09-12 RX ADMIN — HYDROMORPHONE HYDROCHLORIDE PRN MG: 1 INJECTION, SOLUTION INTRAMUSCULAR; INTRAVENOUS; SUBCUTANEOUS at 03:06

## 2022-09-12 RX ADMIN — CEFAZOLIN SCH MLS/HR: 330 INJECTION, POWDER, FOR SOLUTION INTRAMUSCULAR; INTRAVENOUS at 19:36

## 2022-09-12 RX ADMIN — HYDROCODONE BITARTRATE AND ACETAMINOPHEN PRN EACH: 5; 325 TABLET ORAL at 12:36

## 2022-09-12 RX ADMIN — DEXTROSE MONOHYDRATE, SODIUM CHLORIDE, AND POTASSIUM CHLORIDE SCH: 50; 4.5; 1.49 INJECTION, SOLUTION INTRAVENOUS at 02:32

## 2022-09-12 RX ADMIN — SIMETHICONE SCH: 20 SUSPENSION/ DROPS ORAL at 09:11

## 2022-09-12 RX ADMIN — DEXTROSE MONOHYDRATE, SODIUM CHLORIDE, AND POTASSIUM CHLORIDE SCH: 50; 4.5; 1.49 INJECTION, SOLUTION INTRAVENOUS at 17:00

## 2022-09-12 RX ADMIN — HYDROMORPHONE HYDROCHLORIDE PRN MG: 1 INJECTION, SOLUTION INTRAMUSCULAR; INTRAVENOUS; SUBCUTANEOUS at 13:26

## 2022-09-12 RX ADMIN — SIMETHICONE SCH: 20 SUSPENSION/ DROPS ORAL at 12:35

## 2022-09-12 RX ADMIN — HYDROMORPHONE HYDROCHLORIDE PRN MG: 1 INJECTION, SOLUTION INTRAMUSCULAR; INTRAVENOUS; SUBCUTANEOUS at 09:10

## 2022-09-12 RX ADMIN — PANTOPRAZOLE SODIUM SCH MG: 40 INJECTION, POWDER, FOR SOLUTION INTRAVENOUS at 09:09

## 2022-09-12 RX ADMIN — ENOXAPARIN SODIUM SCH MG: 40 INJECTION SUBCUTANEOUS at 09:09

## 2022-09-12 RX ADMIN — SIMETHICONE SCH: 20 SUSPENSION/ DROPS ORAL at 17:00

## 2022-09-12 NOTE — P.PN
Subjective


Progress Note Date: 09/11/22


Principal diagnosis: 


Acute complicated diverticulitis





Patient is a 41-year-old  male presented to the hospital with abdominal

pain with a recent diagnosis of diverticulitis failing outpatient oral Augmentin

therapy with repeat CAT scan shows perforation.  Patient was taken to the OR 

09/07/2022 status post laparotomy and diverting colostomy


 on today's evaluation that is 09/11/2022, the patient continues to be afebrile,

the patient abdominal pain is controlled , the patient is breathing comfortably 

on room air, patient denies nausea, vomiting no chest pain shortness of breath 

or cough





Objective





- Vital Signs


Vital signs: 


                                   Vital Signs











Temp  98.7 F   09/11/22 11:19


 


Pulse  78   09/11/22 11:19


 


Resp  16   09/11/22 11:19


 


BP  130/89   09/11/22 11:19


 


Pulse Ox  94 L  09/11/22 11:19


 


FiO2      








                                 Intake & Output











 09/10/22 09/11/22 09/11/22





 18:59 06:59 18:59


 


Intake Total 960 580 


 


Output Total 1380 1490 


 


Balance -420 -910 


 


Weight 138.346 kg  


 


Intake:   


 


  Intake, IV Titration 960 580 





  Amount   


 


    D5-0.45% NaCl with KCl 960  





    20Meq/l 1,000 ml @ 80 mls   





    /hr IV .[id number] [facility] Rx#:   





    [id number]   


 


    Lactated Ringers 1,000 ml  240 





    @ 0 mls/hr IV .STK-MED   





    [facility] Rx#:[id number]   


 


    Mvi, Adult No.4 with Vit  240 





    K 10 ml Trace (Conc-1Ml/   





    Dose) 1 ml In Amino Acid   





    5%-D20w+Lytes*E* 1,000 ml   





    @ 30 mls/hr IV .Q24H [facility]   





    Rx#:[id number]   


 


    Piperacillin-Tazobactam 3  100 





    .375 gm In Sodium   





    Chloride 0.9% 100 ml @ 25   





    mls/hr IVPB Q8H [facility] Rx#:   





    [id number]   


 


Output:   


 


  Gastric Drainage  650 


 


  Drainage 80 15 


 


    Right Abdomen 80 15 


 


  Urine 1100 725 


 


  Stool 100 100 


 


  Oral Regurgitation 100  


 


Other:   


 


  Voiding Method Indwelling Catheter Indwelling Catheter Urinal


 


  # Voids 6  














- Exam


GENERAL DESCRIPTION: Middle-aged male lying in bed in no distress





RESPIRATORY SYSTEM: Unlabored breathing , decreased breath sounds at bases





HEART: S1 S2 regular rate and rhythm ,





ABDOMEN: Soft , no tenderness





EXTREMITIES: No edema feet








- Labs


CBC & Chem 7: 


                                 09/12/22 05:14





                                 09/12/22 05:14


Labs: 


                  Abnormal Lab Results - Last 24 Hours (Table)











  09/10/22 09/11/22 09/11/22 Range/Units





  16:26 06:06 06:06 


 


WBC    17.02 H  (4.50-10.00)  X 10*3/uL


 


RBC    4.25 L  (4.40-5.60)  X 10*6/uL


 


Hct    38.8 L  (39.6-50.0)  %


 


Plt Count    563 H  (140-440)  X 10*3/uL


 


Immature Gran #    0.37 H  (0.00-0.04)  X 10*3/uL


 


Neutrophils #    13.82 H  (1.80-7.70)  X 10*3/uL


 


Eosinophils #    0.36 H  (0.04-0.35)  X 10*3/uL


 


Basophils #    0.12 H  (0.00-0.10)  X 10*3/uL


 


Sodium  136 L    (137-145)  mmol/L


 


Chloride  97 L    ()  mmol/L


 


Carbon Dioxide   29.7 H   (20.0-27.5)  mmol/L


 


Anion Gap   7.30 L   (10.00-18.00)  mmol/L


 


Glucose  104 H  121 H   (74-99)  mg/dL


 


POC Glucose (mg/dL)     ()  mg/dL


 


Total Protein   6.1 L   (6.2-8.2)  g/dL


 


Albumin   3.5 L   (3.8-4.9)  g/dL


 


Albumin/Globulin Ratio   1.35 L   (1.60-3.17)  g/dL


 


Triglycerides  197.00 H    (0..00)  mg/dL














  09/11/22 Range/Units





  12:04 


 


WBC   (4.50-10.00)  X 10*3/uL


 


RBC   (4.40-5.60)  X 10*6/uL


 


Hct   (39.6-50.0)  %


 


Plt Count   (140-440)  X 10*3/uL


 


Immature Gran #   (0.00-0.04)  X 10*3/uL


 


Neutrophils #   (1.80-7.70)  X 10*3/uL


 


Eosinophils #   (0.04-0.35)  X 10*3/uL


 


Basophils #   (0.00-0.10)  X 10*3/uL


 


Sodium   (137-145)  mmol/L


 


Chloride   ()  mmol/L


 


Carbon Dioxide   (20.0-27.5)  mmol/L


 


Anion Gap   (10.00-18.00)  mmol/L


 


Glucose   (74-99)  mg/dL


 


POC Glucose (mg/dL)  117 H  ()  mg/dL


 


Total Protein   (6.2-8.2)  g/dL


 


Albumin   (3.8-4.9)  g/dL


 


Albumin/Globulin Ratio   (1.60-3.17)  g/dL


 


Triglycerides   (0..00)  mg/dL














Assessment and Plan


(1) Acute diverticulitis


Current Visit: Yes   Status: Acute   Code(s): K57.92 - DVTRCLI OF INTEST, PART 

UNSP, W/O PERF OR ABSCESS W/O BLEED   SNOMED Code(s): 740868552


   





(2) Failure of outpatient treatment


Current Visit: Yes   Status: Acute   Code(s): Z78.9 - OTHER SPECIFIED HEALTH 

STATUS   SNOMED Code(s): 346851071


   


Plan: 


1patient presented to hospital with sepsis in this patient did have a fever 

tachycardia elevated white count source is acute complicated diverticulitis with

perforation and will need to cover for the enteric gram-negative both aerobes 

and anaerobes.


2patient seemed to have failed medical therapy in this patient who is status 

post laparotomy and diverting colostomy, no OR culture were done


3-patient slowly clinically improving and will continue with the Zosyn and 

monitor clinical course closely


Time with Patient: Less than 30

## 2022-09-12 NOTE — P.PN
Subjective


Progress Note Date: 09/12/22





CHIEF COMPLAINT: Perforated Diverticulitis





HISTORY OF PRESENT ILLNESS: Patient is postop day #4 status post sigmoid 

colectomy with end colostomy for perforated sigmoid diverticulitis.  Patient did

have worsening abdominal pain over the weekend.  This has improved.  He did 

require increase in IV Dilaudid over the weekend.  He is now on Dilaudid 1 mg IV

every 3 hours.  He reports that he started having pain around the 2 hour juana.  

He denies any nausea or vomiting.  His ostomy is functioning.  Afebrile.  WBC 

17.0-17.33 Hgb 13.1 sodium 137 potassium 4.1 creatinine 0.9 patient is also on 

TPN for nutrition support.  NG tube was discontinued yesterday.  Patient is 

sitting up at bedside chair.





PHYSICAL EXAM: 


VITAL SIGNS: Reviewed.


GENERAL: Well-developed in no acute distress. 


HEENT:  No sclera icterus. Extraocular movements grossly intact.  Moist buccal 

mucosa. Head is atraumatic, normocephalic. 


ABDOMEN:  Soft.  Nondistended.  Incision clean dry and intact.  He does have 3 

cherelle in place that have old blood noted.  Stoma pink stool and air noted in 

ostomy bag


NEUROLOGIC: Alert and oriented. Cranial nerves II through XII grossly intact.





ASSESSMENT: 


1.  Perforated sigmoid diverticulitis status post sigmoid colectomy with end 

colostomy


2.  Leukocytosis


3.  Moderate protein calorie malnutrition





PLAN: 


-Change abdominal cherelle daily


-Continue clear liquid diet


-Add Norco to help with pain control


-Continue IV antibiotics


-Encouraged patient to increase activity level


-Encouraged patient to use incentive spirometer


-GI prophylaxis Protonix and DVT prophylaxis subcu heparin





Physician Assistant note has been reviewed by physician. Signing provider agrees

with the documented findings, assessment, and plan of care. 





I have personally seen and examined the patient, reviewed the NP /PAs history, 

exam and MDM and agree with the assessment and plan as written. Based on total 

visit time, I have performed more than 50% of the visit.





As above:  Patient says his pain is better than it was 2 days ago.  He thinks he

is gradually improving.  He is sitting up in a chair.  He walked in the hallways

the length of the tong.  He is having ostomy function with large volume of loose

stool at this time.  Cherelle were changed today.  White blood cell count remains 

elevated.  Continue clear liquids for now.  Continue antibiotics.  Continue 

increasing activity.





Objective





- Vital Signs


Vital signs: 


                                   Vital Signs











Temp  98.0 F   09/12/22 03:44


 


Pulse  86   09/12/22 03:44


 


Resp  16   09/12/22 03:44


 


BP  149/81   09/12/22 03:44


 


Pulse Ox  96   09/12/22 03:44


 


FiO2      








                                 Intake & Output











 09/11/22 09/12/22 09/12/22





 18:59 06:59 18:59


 


Intake Total 1040 2920 


 


Output Total 20 1815 


 


Balance 1020 1105 


 


Intake:   


 


  Intake, IV Titration 800 2420 





  Amount   


 


    Fat Emulsion 20% 500 ml  400 





    In Empty Bag 1 bag @ 42   





    mls/hr IV Q7D [facility] Rx#:   





    [id number]   


 


    Mvi, Adult No.4 with Vit  1200 





    K 10 ml Trace (Conc-1Ml/   





    Dose) 1 ml In Amino Acid   





    5%-D20w+Lytes*E* 1,000 ml   





    @ 100 mls/hr IV .BY   





    DUR[facility] Rx#:   





    [id number]   


 


    Mvi, Adult No.4 with Vit 360 720 





    K 10 ml Trace (Conc-1Ml/   





    Dose) 1 ml In Amino Acid   





    5%-D20w+Lytes*E* 1,000 ml   





    @ 30 mls/hr IV .Q24H [facility]   





    Rx#:[id number]   


 


    Piperacillin-Tazobactam 3 200 100 





    .375 gm In Sodium   





    Chloride 0.9% 100 ml @ 25   





    mls/hr IVPB Q8H [facility] Rx#:   





    [id number]   


 


    Sodium Chloride 0.9% 1, 240  





    000 ml @ 20 mls/hr IV .   





    Q24H [facility] Rx#:[id number]   


 


  Oral 240 500 


 


Output:   


 


  Drainage 20 15 


 


    Right Abdomen 20 15 


 


  Urine  1800 


 


Other:   


 


  Voiding Method Urinal Urinal 


 


  # Voids 5  














- Labs


CBC & Chem 7: 


                                 09/12/22 05:14





                                 09/12/22 05:14


Labs: 


                  Abnormal Lab Results - Last 24 Hours (Table)











  09/11/22 09/11/22 09/11/22 Range/Units





  12:04 18:06 23:40 


 


WBC     (4.50-10.00)  X 10*3/uL


 


RBC     (4.40-5.60)  X 10*6/uL


 


Hct     (39.6-50.0)  %


 


Plt Count     (140-440)  X 10*3/uL


 


Immature Gran #     (0.00-0.04)  X 10*3/uL


 


Neutrophils #     (1.80-7.70)  X 10*3/uL


 


Eosinophils #     (0.04-0.35)  X 10*3/uL


 


Basophils #     (0.00-0.10)  X 10*3/uL


 


Glucose     ()  mg/dL


 


POC Glucose (mg/dL)  117 H  111 H  158 H  ()  mg/dL


 


Albumin     (3.8-4.9)  g/dL


 


Albumin/Globulin Ratio     (1.60-3.17)  g/dL














  09/12/22 09/12/22 09/12/22 Range/Units





  05:14 05:14 05:51 


 


WBC   17.33 H   (4.50-10.00)  X 10*3/uL


 


RBC   4.21 L   (4.40-5.60)  X 10*6/uL


 


Hct   38.9 L   (39.6-50.0)  %


 


Plt Count   544 H   (140-440)  X 10*3/uL


 


Immature Gran #   0.43 H   (0.00-0.04)  X 10*3/uL


 


Neutrophils #   14.11 H   (1.80-7.70)  X 10*3/uL


 


Eosinophils #   0.39 H   (0.04-0.35)  X 10*3/uL


 


Basophils #   0.13 H   (0.00-0.10)  X 10*3/uL


 


Glucose  151 H    ()  mg/dL


 


POC Glucose (mg/dL)    142 H  ()  mg/dL


 


Albumin  3.6 L    (3.8-4.9)  g/dL


 


Albumin/Globulin Ratio  1.38 L    (1.60-3.17)  g/dL














  09/12/22 Range/Units





  11:27 


 


WBC   (4.50-10.00)  X 10*3/uL


 


RBC   (4.40-5.60)  X 10*6/uL


 


Hct   (39.6-50.0)  %


 


Plt Count   (140-440)  X 10*3/uL


 


Immature Gran #   (0.00-0.04)  X 10*3/uL


 


Neutrophils #   (1.80-7.70)  X 10*3/uL


 


Eosinophils #   (0.04-0.35)  X 10*3/uL


 


Basophils #   (0.00-0.10)  X 10*3/uL


 


Glucose   ()  mg/dL


 


POC Glucose (mg/dL)  144 H  ()  mg/dL


 


Albumin   (3.8-4.9)  g/dL


 


Albumin/Globulin Ratio   (1.60-3.17)  g/dL

## 2022-09-12 NOTE — P.PN
Subjective


Progress Note Date: 09/12/22


Principal diagnosis: 





abdominal pain





Hospital Course: 


41-year-old malein significant past medical history presenting with sepsis 

secondary to acute perforated sigmoid diverticulitis.  Patient has postop day 4 

status post sigmoid colectomy with end colostomy.  Infectious disease also 

following patient.  Patient remains on Zosyn with clinical improvement.  Curr

ently tolerating minimal amounts of clear liquids.








Subjective: 


Patient seen and examined at bedside.  No acute events overnight.  Patient 

claims that his oral intake is slowly improving.  He has also started to walk.  

He continues to have abdominal pain around the incision and colostomy.  He johnna

es any worsening pain.  He denies any chest pain or shortness of breath or 

urinary complaints.





Pertinent positives and negatives as discussed above, a complete review of 

systems was performed and all other systems are negative.








Vitals Signs Reviewed. 





General: nontoxic, no distress, appears at stated age


Derm: warm, dry


Head: atraumatic, normocephalic, symmetric


Eyes: EOMI, no lid lag, anicteric sclera


Mouth: no lip lesion, mucus membranes moist


Cardiovascular: S1S2 reg, no murmur


Lungs: CTA bilateral, no rhonchi, no rales , no accessory muscle use


Abdominal: soft, mild tenderness to palpation all quadrants, no guarding, no 

appreciable organomegaly, midline incision covered in dressing, left sided 

colostomy with stool


Ext: no gross muscle atrophy, no edema, no contractures


Neuro:  CN II-XI grossly intact, no focal neuro deficits


Psych: Alert, oriented, appropriate affect 








Assessment and Plan:





Sepsis


Perforated sigmoid diverticulitis with pneumoperitoneum


Leukocytosis


-Status post sigmoid colectomy with end colostomy on 9/7


-Surgery following


-Infectious disease following


-Blood cultures negative


-Continue Zosyn, improving clinically


-Slowly advance diet, currently on clear liquids


-Pain control





Impaired perfusion to the right upper pole kidney, seen on CT 


-Evaluated by urology, no further management needed





Morbid obesity


-Counseled regarding lifestyle modifications


-Needs outpatient follow





Generalized weakness


-PT/OT








F: D5 half-normal at 80 mL an hour


E: Replete as needed


N: Clear liquids


A: As tolerated





DVT ppx: Lovenox





Code status: Full code





Anticipated discharge place: Home


Anticipated discharge time: 2-3 days








Objective





- Vital Signs


Vital signs: 


                                   Vital Signs











Temp  97.9 F   09/12/22 12:04


 


Pulse  87   09/12/22 12:04


 


Resp  20   09/12/22 12:04


 


BP  163/111   09/12/22 12:04


 


Pulse Ox  97   09/12/22 12:04


 


FiO2      








                                 Intake & Output











 09/11/22 09/12/22 09/12/22





 18:59 06:59 18:59


 


Intake Total 1040 2920 


 


Output Total 20 1815 


 


Balance 1020 1105 


 


Weight   138.346 kg


 


Intake:   


 


  Intake, IV Titration 800 2420 





  Amount   


 


    Fat Emulsion 20% 500 ml  400 





    In Empty Bag 1 bag @ 42   





    mls/hr IV Q7D [facility] Rx#:   





    [id number]   


 


    Mvi, Adult No.4 with Vit  1200 





    K 10 ml Trace (Conc-1Ml/   





    Dose) 1 ml In Amino Acid   





    5%-D20w+Lytes*E* 1,000 ml   





    @ 100 mls/hr IV .BY   





    DURATION [facility] Rx#:   





    [id number]   


 


    Mvi, Adult No.4 with Vit 360 720 





    K 10 ml Trace (Conc-1Ml/   





    Dose) 1 ml In Amino Acid   





    5%-D20w+Lytes*E* 1,000 ml   





    @ 30 mls/hr IV .Q24H [facility]   





    Rx#:[id number]   


 


    Piperacillin-Tazobactam 3 200 100 





    .375 gm In Sodium   





    Chloride 0.9% 100 ml @ 25   





    mls/hr IVPB Q8H [facility] Rx#:   





    [id number]   


 


    Sodium Chloride 0.9% 1, 240  





    000 ml @ 20 mls/hr IV .   





    Q24H [facility] Rx#:[id number]   


 


  Oral 240 500 


 


Output:   


 


  Drainage 20 15 


 


    Right Abdomen 20 15 


 


  Urine  1800 


 


Other:   


 


  Voiding Method Urinal Urinal 


 


  # Voids 5  














- Labs


CBC & Chem 7: 


                                 09/12/22 05:14





                                 09/12/22 05:14


Labs: 


                  Abnormal Lab Results - Last 24 Hours (Table)











  09/11/22 09/11/22 09/12/22 Range/Units





  18:06 23:40 05:14 


 


WBC     (4.50-10.00)  X 10*3/uL


 


RBC     (4.40-5.60)  X 10*6/uL


 


Hct     (39.6-50.0)  %


 


Plt Count     (140-440)  X 10*3/uL


 


Immature Gran #     (0.00-0.04)  X 10*3/uL


 


Neutrophils #     (1.80-7.70)  X 10*3/uL


 


Eosinophils #     (0.04-0.35)  X 10*3/uL


 


Basophils #     (0.00-0.10)  X 10*3/uL


 


Glucose    151 H  ()  mg/dL


 


POC Glucose (mg/dL)  111 H  158 H   ()  mg/dL


 


Albumin    3.6 L  (3.8-4.9)  g/dL


 


Albumin/Globulin Ratio    1.38 L  (1.60-3.17)  g/dL














  09/12/22 09/12/22 09/12/22 Range/Units





  05:14 05:51 11:27 


 


WBC  17.33 H    (4.50-10.00)  X 10*3/uL


 


RBC  4.21 L    (4.40-5.60)  X 10*6/uL


 


Hct  38.9 L    (39.6-50.0)  %


 


Plt Count  544 H    (140-440)  X 10*3/uL


 


Immature Gran #  0.43 H    (0.00-0.04)  X 10*3/uL


 


Neutrophils #  14.11 H    (1.80-7.70)  X 10*3/uL


 


Eosinophils #  0.39 H    (0.04-0.35)  X 10*3/uL


 


Basophils #  0.13 H    (0.00-0.10)  X 10*3/uL


 


Glucose     ()  mg/dL


 


POC Glucose (mg/dL)   142 H  144 H  ()  mg/dL


 


Albumin     (3.8-4.9)  g/dL


 


Albumin/Globulin Ratio     (1.60-3.17)  g/dL

## 2022-09-13 LAB
ANION GAP SERPL CALC-SCNC: 11.8 MMOL/L (ref 10–18)
BASOPHILS # BLD AUTO: 0.11 X 10*3/UL (ref 0–0.1)
BASOPHILS NFR BLD AUTO: 0.7 %
BUN SERPL-SCNC: 15.4 MG/DL (ref 9–27)
BUN/CREAT SERPL: 17.11 RATIO (ref 12–20)
CALCIUM SPEC-MCNC: 8.9 MG/DL (ref 8.7–10.3)
CHLORIDE SERPL-SCNC: 97 MMOL/L (ref 96–109)
CO2 SERPL-SCNC: 27.2 MMOL/L (ref 20–27.5)
EOSINOPHIL # BLD AUTO: 0.37 X 10*3/UL (ref 0.04–0.35)
EOSINOPHIL NFR BLD AUTO: 2.3 %
ERYTHROCYTE [DISTWIDTH] IN BLOOD BY AUTOMATED COUNT: 4.32 X 10*6/UL (ref 4.4–5.6)
ERYTHROCYTE [DISTWIDTH] IN BLOOD: 13.5 % (ref 11.5–14.5)
GLUCOSE BLD-MCNC: 113 MG/DL (ref 70–110)
GLUCOSE BLD-MCNC: 125 MG/DL (ref 70–110)
GLUCOSE BLD-MCNC: 138 MG/DL (ref 70–110)
GLUCOSE SERPL-MCNC: 120 MG/DL (ref 70–110)
HCT VFR BLD AUTO: 40.7 % (ref 39.6–50)
HGB BLD-MCNC: 13 G/DL (ref 13–17)
IMM GRANULOCYTES BLD QL AUTO: 3 %
LYMPHOCYTES # SPEC AUTO: 1.84 X 10*3/UL (ref 0.9–5)
LYMPHOCYTES NFR SPEC AUTO: 11.4 %
MAGNESIUM SPEC-SCNC: 2.3 MG/DL (ref 1.5–2.4)
MCH RBC QN AUTO: 30.1 PG (ref 27–32)
MCHC RBC AUTO-ENTMCNC: 31.9 G/DL (ref 32–37)
MCV RBC AUTO: 94.2 FL (ref 80–97)
MONOCYTES # BLD AUTO: 0.96 X 10*3/UL (ref 0.2–1)
MONOCYTES NFR BLD AUTO: 6 %
NEUTROPHILS # BLD AUTO: 12.35 X 10*3/UL (ref 1.8–7.7)
NEUTROPHILS NFR BLD AUTO: 76.6 %
NRBC BLD AUTO-RTO: 0 /100 WBCS (ref 0–0)
PLATELET # BLD AUTO: 513 X 10*3/UL (ref 140–440)
POTASSIUM SERPL-SCNC: 4.5 MMOL/L (ref 3.5–5.5)
SODIUM SERPL-SCNC: 136 MMOL/L (ref 135–145)
WBC # BLD AUTO: 16.12 X 10*3/UL (ref 4.5–10)

## 2022-09-13 RX ADMIN — DEXTROSE MONOHYDRATE, SODIUM CHLORIDE, AND POTASSIUM CHLORIDE SCH: 50; 4.5; 1.49 INJECTION, SOLUTION INTRAVENOUS at 16:55

## 2022-09-13 RX ADMIN — PANTOPRAZOLE SODIUM SCH MG: 40 INJECTION, POWDER, FOR SOLUTION INTRAVENOUS at 09:55

## 2022-09-13 RX ADMIN — PIPERACILLIN AND TAZOBACTAM SCH MLS/HR: 3; .375 INJECTION, POWDER, FOR SOLUTION INTRAVENOUS at 20:38

## 2022-09-13 RX ADMIN — ENOXAPARIN SODIUM SCH MG: 40 INJECTION SUBCUTANEOUS at 09:56

## 2022-09-13 RX ADMIN — HYDROCODONE BITARTRATE AND ACETAMINOPHEN PRN EACH: 5; 325 TABLET ORAL at 15:06

## 2022-09-13 RX ADMIN — DOCUSATE SODIUM SCH MG: 100 CAPSULE, LIQUID FILLED ORAL at 21:37

## 2022-09-13 RX ADMIN — DOCUSATE SODIUM SCH MG: 100 CAPSULE, LIQUID FILLED ORAL at 09:56

## 2022-09-13 RX ADMIN — ONDANSETRON PRN MG: 2 INJECTION INTRAMUSCULAR; INTRAVENOUS at 05:56

## 2022-09-13 RX ADMIN — HYDROMORPHONE HYDROCHLORIDE PRN MG: 1 INJECTION, SOLUTION INTRAMUSCULAR; INTRAVENOUS; SUBCUTANEOUS at 04:05

## 2022-09-13 RX ADMIN — HYDROMORPHONE HYDROCHLORIDE PRN MG: 1 INJECTION, SOLUTION INTRAMUSCULAR; INTRAVENOUS; SUBCUTANEOUS at 09:04

## 2022-09-13 RX ADMIN — SIMETHICONE SCH MG: 20 SUSPENSION/ DROPS ORAL at 16:52

## 2022-09-13 RX ADMIN — HYDROCODONE BITARTRATE AND ACETAMINOPHEN PRN EACH: 5; 325 TABLET ORAL at 10:48

## 2022-09-13 RX ADMIN — SIMETHICONE SCH: 20 SUSPENSION/ DROPS ORAL at 09:57

## 2022-09-13 RX ADMIN — PIPERACILLIN AND TAZOBACTAM SCH MLS/HR: 3; .375 INJECTION, POWDER, FOR SOLUTION INTRAVENOUS at 12:31

## 2022-09-13 RX ADMIN — HYDROCODONE BITARTRATE AND ACETAMINOPHEN PRN EACH: 5; 325 TABLET ORAL at 05:49

## 2022-09-13 RX ADMIN — HYDROCODONE BITARTRATE AND ACETAMINOPHEN PRN EACH: 5; 325 TABLET ORAL at 20:35

## 2022-09-13 RX ADMIN — SIMETHICONE SCH: 20 SUSPENSION/ DROPS ORAL at 21:49

## 2022-09-13 RX ADMIN — HYDROMORPHONE HYDROCHLORIDE PRN MG: 1 INJECTION, SOLUTION INTRAMUSCULAR; INTRAVENOUS; SUBCUTANEOUS at 16:52

## 2022-09-13 RX ADMIN — LEUCINE, PHENYLALANINE, LYSINE, METHIONINE, ISOLEUCINE, VALINE, HISTIDINE, THREONINE, TRYPTOPHAN, ALANINE, GLYCINE, ARGININE, PROLINE, SERINE, TYROSINE, SODIUM ACETATE, DIBASIC POTASSIUM PHOSPHATE, MAGNESIUM CHLORIDE, SODIUM CHLORIDE, CALCIUM CHLORIDE, DEXTROSE SCH MLS/HR
365; 280; 290; 200; 300; 290; 240; 210; 90; 1035; 515; 575; 340; 250; 20; 340; 261; 51; 59; 33; 20 INJECTION INTRAVENOUS at 05:50

## 2022-09-13 RX ADMIN — DEXTROSE MONOHYDRATE, SODIUM CHLORIDE, AND POTASSIUM CHLORIDE SCH: 50; 4.5; 1.49 INJECTION, SOLUTION INTRAVENOUS at 03:29

## 2022-09-13 RX ADMIN — LEUCINE, PHENYLALANINE, LYSINE, METHIONINE, ISOLEUCINE, VALINE, HISTIDINE, THREONINE, TRYPTOPHAN, ALANINE, GLYCINE, ARGININE, PROLINE, SERINE, TYROSINE, SODIUM ACETATE, DIBASIC POTASSIUM PHOSPHATE, MAGNESIUM CHLORIDE, SODIUM CHLORIDE, CALCIUM CHLORIDE, DEXTROSE SCH MLS/HR
365; 280; 290; 200; 300; 290; 240; 210; 90; 1035; 515; 575; 340; 250; 20; 340; 261; 51; 59; 33; 20 INJECTION INTRAVENOUS at 14:59

## 2022-09-13 RX ADMIN — HYDROCODONE BITARTRATE AND ACETAMINOPHEN PRN EACH: 5; 325 TABLET ORAL at 01:23

## 2022-09-13 RX ADMIN — HYDROMORPHONE HYDROCHLORIDE PRN MG: 1 INJECTION, SOLUTION INTRAMUSCULAR; INTRAVENOUS; SUBCUTANEOUS at 00:07

## 2022-09-13 RX ADMIN — CEFAZOLIN SCH: 330 INJECTION, POWDER, FOR SOLUTION INTRAMUSCULAR; INTRAVENOUS at 16:55

## 2022-09-13 RX ADMIN — SIMETHICONE SCH: 20 SUSPENSION/ DROPS ORAL at 13:50

## 2022-09-13 RX ADMIN — HYDROMORPHONE HYDROCHLORIDE PRN MG: 1 INJECTION, SOLUTION INTRAMUSCULAR; INTRAVENOUS; SUBCUTANEOUS at 12:33

## 2022-09-13 RX ADMIN — PIPERACILLIN AND TAZOBACTAM SCH MLS/HR: 3; .375 INJECTION, POWDER, FOR SOLUTION INTRAVENOUS at 03:37

## 2022-09-13 RX ADMIN — HYDROMORPHONE HYDROCHLORIDE PRN MG: 1 INJECTION, SOLUTION INTRAMUSCULAR; INTRAVENOUS; SUBCUTANEOUS at 21:38

## 2022-09-13 NOTE — P.PN
Subjective


Progress Note Date: 09/12/22


Principal diagnosis: 


Acute complicated diverticulitis





Patient is a 41-year-old  male presented to the hospital with abdominal

pain with a recent diagnosis of diverticulitis failing outpatient oral Augmentin

therapy with repeat CAT scan shows perforation.  Patient was taken to the OR 

09/07/2022 status post laparotomy and diverting colostomy


 on today's evaluation that is 09/12/2022, the patient remains to be afebrile, 

the patient abdominal pain is controlled with the current meds , the patient is 

breathing comfortably on room air, patient denies nausea, vomiting no chest pain

shortness of breath or cough





Objective





- Vital Signs


Vital signs: 


                                   Vital Signs











Temp  98.0 F   09/12/22 03:44


 


Pulse  86   09/12/22 03:44


 


Resp  16   09/12/22 03:44


 


BP  149/81   09/12/22 03:44


 


Pulse Ox  96   09/12/22 03:44


 


FiO2      








                                 Intake & Output











 09/11/22 09/12/22 09/12/22





 18:59 06:59 18:59


 


Intake Total 1040 2920 


 


Output Total 20 1815 


 


Balance 1020 1105 


 


Intake:   


 


  Intake, IV Titration 800 2420 





  Amount   


 


    Fat Emulsion 20% 500 ml  400 





    In Empty Bag 1 bag @ 42   





    mls/hr IV Q7D [facility] Rx#:   





    [id number]   


 


    Mvi, Adult No.4 with Vit  1200 





    K 10 ml Trace (Conc-1Ml/   





    Dose) 1 ml In Amino Acid   





    5%-D20w+Lytes*E* 1,000 ml   





    @ 100 mls/hr IV .BY   





    DURATION [facility] Rx#:   





    [id number]   


 


    Mvi, Adult No.4 with Vit 360 720 





    K 10 ml Trace (Conc-1Ml/   





    Dose) 1 ml In Amino Acid   





    5%-D20w+Lytes*E* 1,000 ml   





    @ 30 mls/hr IV .Q24H [facility]   





    Rx#:[id number]   


 


    Piperacillin-Tazobactam 3 200 100 





    .375 gm In Sodium   





    Chloride 0.9% 100 ml @ 25   





    mls/hr IVPB Q8H [facility] Rx#:   





    [id number]   


 


    Sodium Chloride 0.9% 1, 240  





    000 ml @ 20 mls/hr IV .   





    Q24H [facility] Rx#:[id number]   


 


  Oral 240 500 


 


Output:   


 


  Drainage 20 15 


 


    Right Abdomen 20 15 


 


  Urine  1800 


 


Other:   


 


  Voiding Method Urinal Urinal 


 


  # Voids 5  














- Exam


GENERAL DESCRIPTION: Middle-aged male lying in bed in no distress





RESPIRATORY SYSTEM: Unlabored breathing , decreased breath sounds at bases





HEART: S1 S2 regular rate and rhythm ,





ABDOMEN: Soft , no tenderness





EXTREMITIES: No edema feet








- Labs


CBC & Chem 7: 


                                 09/13/22 06:11





                                 09/13/22 06:11


Labs: 


                  Abnormal Lab Results - Last 24 Hours (Table)











  09/11/22 09/11/22 09/11/22 Range/Units





  12:04 18:06 23:40 


 


WBC     (4.50-10.00)  X 10*3/uL


 


RBC     (4.40-5.60)  X 10*6/uL


 


Hct     (39.6-50.0)  %


 


Plt Count     (140-440)  X 10*3/uL


 


Immature Gran #     (0.00-0.04)  X 10*3/uL


 


Neutrophils #     (1.80-7.70)  X 10*3/uL


 


Eosinophils #     (0.04-0.35)  X 10*3/uL


 


Basophils #     (0.00-0.10)  X 10*3/uL


 


Glucose     ()  mg/dL


 


POC Glucose (mg/dL)  117 H  111 H  158 H  ()  mg/dL


 


Albumin     (3.8-4.9)  g/dL


 


Albumin/Globulin Ratio     (1.60-3.17)  g/dL














  09/12/22 09/12/22 09/12/22 Range/Units





  05:14 05:14 05:51 


 


WBC   17.33 H   (4.50-10.00)  X 10*3/uL


 


RBC   4.21 L   (4.40-5.60)  X 10*6/uL


 


Hct   38.9 L   (39.6-50.0)  %


 


Plt Count   544 H   (140-440)  X 10*3/uL


 


Immature Gran #   0.43 H   (0.00-0.04)  X 10*3/uL


 


Neutrophils #   14.11 H   (1.80-7.70)  X 10*3/uL


 


Eosinophils #   0.39 H   (0.04-0.35)  X 10*3/uL


 


Basophils #   0.13 H   (0.00-0.10)  X 10*3/uL


 


Glucose  151 H    ()  mg/dL


 


POC Glucose (mg/dL)    142 H  ()  mg/dL


 


Albumin  3.6 L    (3.8-4.9)  g/dL


 


Albumin/Globulin Ratio  1.38 L    (1.60-3.17)  g/dL














  09/12/22 Range/Units





  11:27 


 


WBC   (4.50-10.00)  X 10*3/uL


 


RBC   (4.40-5.60)  X 10*6/uL


 


Hct   (39.6-50.0)  %


 


Plt Count   (140-440)  X 10*3/uL


 


Immature Gran #   (0.00-0.04)  X 10*3/uL


 


Neutrophils #   (1.80-7.70)  X 10*3/uL


 


Eosinophils #   (0.04-0.35)  X 10*3/uL


 


Basophils #   (0.00-0.10)  X 10*3/uL


 


Glucose   ()  mg/dL


 


POC Glucose (mg/dL)  144 H  ()  mg/dL


 


Albumin   (3.8-4.9)  g/dL


 


Albumin/Globulin Ratio   (1.60-3.17)  g/dL














Assessment and Plan


(1) Acute diverticulitis


Current Visit: Yes   Status: Acute   Code(s): K57.92 - DVTRCLI OF INTEST, PART 

UNSP, W/O PERF OR ABSCESS W/O BLEED   SNOMED Code(s): 497167497


   





(2) Failure of outpatient treatment


Current Visit: Yes   Status: Acute   Code(s): Z78.9 - OTHER SPECIFIED HEALTH 

STATUS   SNOMED Code(s): 290040018


   


Plan: 


1patient presented to hospital with sepsis in this patient did have a fever 

tachycardia elevated white count source is acute complicated diverticulitis with

perforation and will need to cover for the enteric gram-negative both aerobes 

and anaerobes.


2patient seemed to have failed medical therapy in this patient who is status 

post laparotomy and diverting colostomy, no OR culture were done


3-patient has shown some clinical improvement and will continue with the Zosyn 

and monitor clinical course closely

## 2022-09-13 NOTE — P.PN
Subjective


Progress Note Date: 09/13/22


Principal diagnosis: 


Acute complicated diverticulitis





Patient is a 41-year-old  male presented to the hospital with abdominal

pain with a recent diagnosis of diverticulitis failing outpatient oral Augmentin

therapy with repeat CAT scan shows perforation.  Patient was taken to the OR 

09/07/2022 status post laparotomy and diverting colostomy


 on today's evaluation that is 09/13/2022, the patient continues to be afebrile,

the patient abdominal pain is controlled , the patient is breathing comfortably 

on room air, patient denies nausea, vomiting, the patient did have output in his

colostomy bag, the patient denies chest pain shortness of breath or cough





Objective





- Vital Signs


Vital signs: 


                                   Vital Signs











Temp  98.7 F   09/13/22 12:36


 


Pulse  88   09/13/22 12:36


 


Resp  20   09/13/22 12:36


 


BP  157/92   09/13/22 12:36


 


Pulse Ox  96   09/13/22 12:36


 


FiO2      








                                 Intake & Output











 09/12/22 09/13/22 09/13/22





 18:59 06:59 18:59


 


Intake Total 2551 3151 


 


Output Total  1595 20


 


Balance 2551 1556 -20


 


Weight 138.346 kg  


 


Intake:   


 


  Intake, IV Titration 2551 2351 





  Amount   


 


    Amino Acid 5%-D20w+Lytes* 1200 1000 





    E* 1,000 ml @ 100 mls/hr   





    IV .BY DURATION [facility] Rx#:   





    [id number]   


 


    Mvi, Adult No.4 with Vit 1011 1011 





    K 10 ml Trace (Conc-1Ml/   





    Dose) 1 ml In Amino Acid   





    5%-D20w+Lytes*E* 1,000 ml   





    @ 100 mls/hr IV .BY   





    DURATION [facility] Rx#:   





    [id number]   


 


    Piperacillin-Tazobactam 3 100 100 





    .375 gm In Sodium   





    Chloride 0.9% 100 ml @ 25   





    mls/hr IVPB Q8H [facility] Rx#:   





    [id number]   


 


    Sodium Chloride 0.9% 1, 240 240 





    000 ml @ 20 mls/hr IV .   





    Q24H [facility] Rx#:[id number]   


 


  Oral  800 


 


Output:   


 


  Drainage  20 20


 


    Right Abdomen  20 20


 


  Urine  1575 


 


Other:   


 


  Voiding Method  Toilet Toilet





  Urinal Urinal














- Exam


GENERAL DESCRIPTION: Middle-aged male lying in bed in no distress





RESPIRATORY SYSTEM: Unlabored breathing , decreased breath sounds at bases





HEART: S1 S2 regular rate and rhythm ,





ABDOMEN: Soft , no tenderness





EXTREMITIES: No edema feet








- Labs


CBC & Chem 7: 


                                 09/13/22 06:11





                                 09/13/22 06:11


Labs: 


                  Abnormal Lab Results - Last 24 Hours (Table)











  09/12/22 09/12/22 09/13/22 Range/Units





  16:51 23:40 05:58 


 


WBC     (4.50-10.00)  X 10*3/uL


 


RBC     (4.40-5.60)  X 10*6/uL


 


MCHC     (32.0-37.0)  g/dL


 


Plt Count     (140-440)  X 10*3/uL


 


Immature Gran #     (0.00-0.04)  X 10*3/uL


 


Neutrophils #     (1.80-7.70)  X 10*3/uL


 


Eosinophils #     (0.04-0.35)  X 10*3/uL


 


Basophils #     (0.00-0.10)  X 10*3/uL


 


POC Glucose (mg/dL)  117 H  129 H  113 H  ()  mg/dL














  09/13/22 09/13/22 Range/Units





  06:11 10:54 


 


WBC  16.12 H   (4.50-10.00)  X 10*3/uL


 


RBC  4.32 L   (4.40-5.60)  X 10*6/uL


 


MCHC  31.9 L   (32.0-37.0)  g/dL


 


Plt Count  513 H   (140-440)  X 10*3/uL


 


Immature Gran #  0.49 H   (0.00-0.04)  X 10*3/uL


 


Neutrophils #  12.35 H   (1.80-7.70)  X 10*3/uL


 


Eosinophils #  0.37 H   (0.04-0.35)  X 10*3/uL


 


Basophils #  0.11 H   (0.00-0.10)  X 10*3/uL


 


POC Glucose (mg/dL)   138 H  ()  mg/dL














Assessment and Plan


(1) Acute diverticulitis


Current Visit: Yes   Status: Acute   Code(s): K57.92 - DVTRCLI OF INTEST, PART 

UNSP, W/O PERF OR ABSCESS W/O BLEED   SNOMED Code(s): 052656499


   





(2) Failure of outpatient treatment


Current Visit: Yes   Status: Acute   Code(s): Z78.9 - OTHER SPECIFIED HEALTH 

STATUS   SNOMED Code(s): 999601342


   


Plan: 


1patient presented to hospital with sepsis in this patient did have a fever 

tachycardia elevated white count source is acute complicated diverticulitis with

perforation and will need to cover for the enteric gram-negative both aerobes 

and anaerobes.


2patient seemed to have failed medical therapy in this patient who is status 

post laparotomy and diverting colostomy, no OR culture were done


3-patient has shown some clinical improvement however the patient white count 

remains to be elevated we will recheck his inflammatory markers with a.m. lab 

continue with the Zosyn  


Time with Patient: Less than 30

## 2022-09-13 NOTE — P.PN
Subjective


Progress Note Date: 09/13/22


Principal diagnosis: 





abdominal pain





Hospital Course: 


41-year-old malein significant past medical history presenting with sepsis 

secondary to acute perforated sigmoid diverticulitis.  Patient has postop day 5 

status post sigmoid colectomy with end colostomy.  Infectious disease also 

following patient.  Patient remains on Zosyn with clinical improvement.  

Currently tolerating clear liquids.








Subjective: 


Patient seen and examined at bedside.  No acute events overnight.  Patient 

claims that his oral intake is slowly improving. He continues to have abdominal 

pain around the incision and colostomy.  He denies any worsening pain.  He is 

also complaining of bilateral feet pain on the dorsal aspect, which started 

after he walked yesterday. He denies any chest pain or shortness of breath or 

urinary complaints.





Pertinent positives and negatives as discussed above, a complete review of 

systems was performed and all other systems are negative.








Vitals Signs Reviewed. 





General: nontoxic, no distress, appears at stated age


Derm: warm, dry


Head: atraumatic, normocephalic, symmetric


Eyes: EOMI, no lid lag, anicteric sclera


Mouth: no lip lesion, mucus membranes moist


Cardiovascular: S1S2 reg, no murmur


Lungs: CTA bilateral, no rhonchi, no rales , no accessory muscle use


Abdominal: soft, mild tenderness to palpation all quadrants, no guarding, no 

appreciable organomegaly, midline incision covered in dressing, left sided 

colostomy with stool


Ext: no gross muscle atrophy, no edema, no contractures, bilateral dorsal foot 

tenderness


Neuro:  CN II-XI grossly intact, no focal neuro deficits


Psych: Alert, oriented, appropriate affect 








Assessment and Plan:





Sepsis


Perforated sigmoid diverticulitis with pneumoperitoneum


Leukocytosis


-Status post sigmoid colectomy with end colostomy on 9/7


-Surgery following


-Infectious disease following


-Blood cultures negative


-Continue Zosyn, improving clinically


-Slowly advance diet, currently on clear liquids


-Pain control





Feet pain, bilateral


- likely secondary to prolonged imbolization 


- normal electrolytes 


- abner hose ordered by surgery 





Impaired perfusion to the right upper pole kidney, seen on CT 


-Evaluated by urology, no further management needed





Morbid obesity


-Counseled regarding lifestyle modifications


-Needs outpatient follow





Generalized weakness


-PT/OT








F: D5 half-normal at 80 mL an hour


E: Replete as needed


N: Clear liquids


A: As tolerated





DVT ppx: Lovenox





Code status: Full code





Anticipated discharge place: Home


Anticipated discharge time: 2-3 days





Objective





- Vital Signs


Vital signs: 


                                   Vital Signs











Temp  98.7 F   09/13/22 12:36


 


Pulse  88   09/13/22 12:36


 


Resp  20   09/13/22 12:36


 


BP  157/92   09/13/22 12:36


 


Pulse Ox  96   09/13/22 12:36


 


FiO2      








                                 Intake & Output











 09/12/22 09/13/22 09/13/22





 18:59 06:59 18:59


 


Intake Total 2551 3151 


 


Output Total  1595 670


 


Balance 2551 1556 -670


 


Weight 138.346 kg  


 


Intake:   


 


  Intake, IV Titration 2551 2351 





  Amount   


 


    Amino Acid 5%-D20w+Lytes* 1200 1000 





    E* 1,000 ml @ 100 mls/hr   





    IV .BY DURATION [facility] Rx#:   





    [id number]   


 


    Mvi, Adult No.4 with Vit 1011 1011 





    K 10 ml Trace (Conc-1Ml/   





    Dose) 1 ml In Amino Acid   





    5%-D20w+Lytes*E* 1,000 ml   





    @ 100 mls/hr IV .BY   





    DURATION [facility] Rx#:   





    [id number]   


 


    Piperacillin-Tazobactam 3 100 100 





    .375 gm In Sodium   





    Chloride 0.9% 100 ml @ 25   





    mls/hr IVPB Q8H [facility] Rx#:   





    [id number]   


 


    Sodium Chloride 0.9% 1, 240 240 





    000 ml @ 20 mls/hr IV .   





    Q24H [facility] Rx#:[id number]   


 


  Oral  800 


 


Output:   


 


  Drainage  20 20


 


    Right Abdomen  20 20


 


  Urine  1575 650


 


Other:   


 


  Voiding Method  Toilet Toilet





  Urinal Urinal


 


  # Voids   1














- Labs


CBC & Chem 7: 


                                 09/13/22 06:11





                                 09/13/22 06:11


Labs: 


                  Abnormal Lab Results - Last 24 Hours (Table)











  09/12/22 09/12/22 09/13/22 Range/Units





  16:51 23:40 05:58 


 


WBC     (4.50-10.00)  X 10*3/uL


 


RBC     (4.40-5.60)  X 10*6/uL


 


MCHC     (32.0-37.0)  g/dL


 


Plt Count     (140-440)  X 10*3/uL


 


Immature Gran #     (0.00-0.04)  X 10*3/uL


 


Neutrophils #     (1.80-7.70)  X 10*3/uL


 


Eosinophils #     (0.04-0.35)  X 10*3/uL


 


Basophils #     (0.00-0.10)  X 10*3/uL


 


Glucose     ()  mg/dL


 


POC Glucose (mg/dL)  117 H  129 H  113 H  ()  mg/dL














  09/13/22 09/13/22 09/13/22 Range/Units





  06:11 06:11 10:54 


 


WBC   16.12 H   (4.50-10.00)  X 10*3/uL


 


RBC   4.32 L   (4.40-5.60)  X 10*6/uL


 


MCHC   31.9 L   (32.0-37.0)  g/dL


 


Plt Count   513 H   (140-440)  X 10*3/uL


 


Immature Gran #   0.49 H   (0.00-0.04)  X 10*3/uL


 


Neutrophils #   12.35 H   (1.80-7.70)  X 10*3/uL


 


Eosinophils #   0.37 H   (0.04-0.35)  X 10*3/uL


 


Basophils #   0.11 H   (0.00-0.10)  X 10*3/uL


 


Glucose  120 H    ()  mg/dL


 


POC Glucose (mg/dL)    138 H  ()  mg/dL

## 2022-09-13 NOTE — P.PN
Subjective


Progress Note Date: 09/13/22





CHIEF COMPLAINT: Perforated Diverticulitis





HISTORY OF PRESENT ILLNESS: Patient is postop day #5 status post sigmoid 

colectomy with end colostomy for perforated sigmoid diverticulitis.  Patient 

reports that his pain is controlled.  His pain is better than yesterday.  He 

denies any nausea or vomiting.  His ostomy is functioning.  He does report that 

the tops of his feet hurting.  He was able to ambulate in the hallway.  

Afebrile.  WBC is down from 17-6.  Hgb 13 platelets 513.  Abdominal katherin 

changed yesterday by nursing.  BRYSON drain 40ml serosanguinous output.





PHYSICAL EXAM: 


VITAL SIGNS: Reviewed.


GENERAL: Well-developed in no acute distress. 


HEENT:  No sclera icterus. Extraocular movements grossly intact.  Moist buccal 

mucosa. Head is atraumatic, normocephalic. 


ABDOMEN:  Soft.  Nondistended.  Incision clean dry and intact.  3 abdominal 

katherin with St serosanguineous drainage.  pineda pink stool and air noted in ostomy 

bag


NEUROLOGIC: Alert and oriented. Cranial nerves II through XII grossly intact.


Extremities: Patient has tenderness with palpation of the tops of his feet 

bilaterally.  There is looks like possibly getting a bruise on the dorsal aspect

of the right foot.  Pulses are intact.  No calf pain bilaterally





ASSESSMENT: 


1.  Perforated sigmoid diverticulitis status post sigmoid colectomy with end 

colostomy


2.  Leukocytosis


3.  Moderate protein calorie malnutrition





PLAN: 


-Continue clear liquid diet


-Continue pain medication 


-Continue IV antibiotics


-Karan hose ordered for feet pain and swelling


-Encouraged patient to increase activity level


-Encouraged patient to use incentive spirometer


-GI prophylaxis Protonix and DVT prophylaxis subcu heparin





Physician Assistant note has been reviewed by physician. Signing provider agrees

with the documented findings, assessment, and plan of care. 





I have personally seen and examined the patient, reviewed the NP /PAs history, 

exam and MDM and agree with the assessment and plan as written. Based on total 

visit time, I have performed more than 50% of the visit.





As above:  Patient is doing better today.  He is having some pain on the dorsum 

of each foot.  It is improved from when it started.  No calf or leg pain.  White

blood cell count improved today.  Abdomen is soft with minimal distention.  Good

ostomy function.  Will advance diet.





Objective





- Vital Signs


Vital signs: 


                                   Vital Signs











Temp  97.6 F   09/13/22 08:01


 


Pulse  99   09/13/22 08:01


 


Resp  18   09/13/22 08:01


 


BP  137/91   09/13/22 08:01


 


Pulse Ox  96   09/13/22 04:07


 


FiO2      








                                 Intake & Output











 09/12/22 09/13/22 09/13/22





 18:59 06:59 18:59


 


Intake Total 2551 3151 


 


Output Total  1595 20


 


Balance 2551 1556 -20


 


Weight 138.346 kg  


 


Intake:   


 


  Intake, IV Titration 2551 2351 





  Amount   


 


    Amino Acid 5%-D20w+Lytes* 1200 1000 





    E* 1,000 ml @ 100 mls/hr   





    IV .BY DURATION [facility] Rx#:   





    [id number]   


 


    Mvi, Adult No.4 with Vit 1011 1011 





    K 10 ml Trace (Conc-1Ml/   





    Dose) 1 ml In Amino Acid   





    5%-D20w+Lytes*E* 1,000 ml   





    @ 100 mls/hr IV .BY   





    DURATION [facility] Rx#:   





    [id number]   


 


    Piperacillin-Tazobactam 3 100 100 





    .375 gm In Sodium   





    Chloride 0.9% 100 ml @ 25   





    mls/hr IVPB Q8H [facility] Rx#:   





    [id number]   


 


    Sodium Chloride 0.9% 1, 240 240 





    000 ml @ 20 mls/hr IV .   





    Q24H [facility] Rx#:[id number]   


 


  Oral  800 


 


Output:   


 


  Drainage  20 20


 


    Right Abdomen  20 20


 


  Urine  1575 


 


Other:   


 


  Voiding Method  Toilet Toilet





  Urinal Urinal














- Labs


CBC & Chem 7: 


                                 09/13/22 06:11





                                 09/13/22 06:11


Labs: 


                  Abnormal Lab Results - Last 24 Hours (Table)











  09/12/22 09/12/22 09/12/22 Range/Units





  11:27 16:51 23:40 


 


WBC     (4.50-10.00)  X 10*3/uL


 


RBC     (4.40-5.60)  X 10*6/uL


 


MCHC     (32.0-37.0)  g/dL


 


Plt Count     (140-440)  X 10*3/uL


 


Immature Gran #     (0.00-0.04)  X 10*3/uL


 


Neutrophils #     (1.80-7.70)  X 10*3/uL


 


Eosinophils #     (0.04-0.35)  X 10*3/uL


 


Basophils #     (0.00-0.10)  X 10*3/uL


 


POC Glucose (mg/dL)  144 H  117 H  129 H  ()  mg/dL














  09/13/22 09/13/22 09/13/22 Range/Units





  05:58 06:11 10:54 


 


WBC   16.12 H   (4.50-10.00)  X 10*3/uL


 


RBC   4.32 L   (4.40-5.60)  X 10*6/uL


 


MCHC   31.9 L   (32.0-37.0)  g/dL


 


Plt Count   513 H   (140-440)  X 10*3/uL


 


Immature Gran #   0.49 H   (0.00-0.04)  X 10*3/uL


 


Neutrophils #   12.35 H   (1.80-7.70)  X 10*3/uL


 


Eosinophils #   0.37 H   (0.04-0.35)  X 10*3/uL


 


Basophils #   0.11 H   (0.00-0.10)  X 10*3/uL


 


POC Glucose (mg/dL)  113 H   138 H  ()  mg/dL

## 2022-09-14 LAB
ANION GAP SERPL CALC-SCNC: 10.1 MMOL/L (ref 10–18)
BASOPHILS # BLD AUTO: 0.1 X 10*3/UL (ref 0–0.1)
BASOPHILS NFR BLD AUTO: 0.7 %
BUN SERPL-SCNC: 14.7 MG/DL (ref 9–27)
BUN/CREAT SERPL: 14.7 RATIO (ref 12–20)
CALCIUM SPEC-MCNC: 9.1 MG/DL (ref 8.7–10.3)
CHLORIDE SERPL-SCNC: 98 MMOL/L (ref 96–109)
CO2 SERPL-SCNC: 26.9 MMOL/L (ref 20–27.5)
EOSINOPHIL # BLD AUTO: 0.38 X 10*3/UL (ref 0.04–0.35)
EOSINOPHIL NFR BLD AUTO: 2.5 %
ERYTHROCYTE [DISTWIDTH] IN BLOOD BY AUTOMATED COUNT: 4.07 X 10*6/UL (ref 4.4–5.6)
ERYTHROCYTE [DISTWIDTH] IN BLOOD: 13.8 % (ref 11.5–14.5)
GLUCOSE BLD-MCNC: 128 MG/DL (ref 70–110)
GLUCOSE BLD-MCNC: 128 MG/DL (ref 70–110)
GLUCOSE BLD-MCNC: 131 MG/DL (ref 70–110)
GLUCOSE BLD-MCNC: 137 MG/DL (ref 70–110)
GLUCOSE SERPL-MCNC: 138 MG/DL (ref 70–110)
HCT VFR BLD AUTO: 37.7 % (ref 39.6–50)
HGB BLD-MCNC: 12.4 G/DL (ref 13–17)
IMM GRANULOCYTES BLD QL AUTO: 2.9 %
LYMPHOCYTES # SPEC AUTO: 1.53 X 10*3/UL (ref 0.9–5)
LYMPHOCYTES NFR SPEC AUTO: 10 %
MAGNESIUM SPEC-SCNC: 2.6 MG/DL (ref 1.5–2.4)
MCH RBC QN AUTO: 30.5 PG (ref 27–32)
MCHC RBC AUTO-ENTMCNC: 32.9 G/DL (ref 32–37)
MCV RBC AUTO: 92.6 FL (ref 80–97)
MONOCYTES # BLD AUTO: 1.16 X 10*3/UL (ref 0.2–1)
MONOCYTES NFR BLD AUTO: 7.6 %
NEUTROPHILS # BLD AUTO: 11.74 X 10*3/UL (ref 1.8–7.7)
NEUTROPHILS NFR BLD AUTO: 76.3 %
NRBC BLD AUTO-RTO: 0 /100 WBCS (ref 0–0)
PLATELET # BLD AUTO: 466 X 10*3/UL (ref 140–440)
POTASSIUM SERPL-SCNC: 4.7 MMOL/L (ref 3.5–5.5)
SODIUM SERPL-SCNC: 135 MMOL/L (ref 135–145)
WBC # BLD AUTO: 15.36 X 10*3/UL (ref 4.5–10)

## 2022-09-14 RX ADMIN — HYDROMORPHONE HYDROCHLORIDE PRN MG: 1 INJECTION, SOLUTION INTRAMUSCULAR; INTRAVENOUS; SUBCUTANEOUS at 21:31

## 2022-09-14 RX ADMIN — LEUCINE, PHENYLALANINE, LYSINE, METHIONINE, ISOLEUCINE, VALINE, HISTIDINE, THREONINE, TRYPTOPHAN, ALANINE, GLYCINE, ARGININE, PROLINE, SERINE, TYROSINE, DEXTROSE SCH MLS/HR: 311; 238; 247; 170; 255; 247; 204; 179; 77; 880; 438; 489; 289; 213; 17; 10 INJECTION INTRAVENOUS at 21:34

## 2022-09-14 RX ADMIN — FLUCONAZOLE SCH MG: 100 TABLET ORAL at 13:42

## 2022-09-14 RX ADMIN — DOCUSATE SODIUM SCH MG: 100 CAPSULE, LIQUID FILLED ORAL at 21:04

## 2022-09-14 RX ADMIN — HYDROMORPHONE HYDROCHLORIDE PRN MG: 1 INJECTION, SOLUTION INTRAMUSCULAR; INTRAVENOUS; SUBCUTANEOUS at 14:28

## 2022-09-14 RX ADMIN — DEXTROSE MONOHYDRATE, SODIUM CHLORIDE, AND POTASSIUM CHLORIDE SCH: 50; 4.5; 1.49 INJECTION, SOLUTION INTRAVENOUS at 16:43

## 2022-09-14 RX ADMIN — DOCUSATE SODIUM SCH MG: 100 CAPSULE, LIQUID FILLED ORAL at 09:07

## 2022-09-14 RX ADMIN — LEUCINE, PHENYLALANINE, LYSINE, METHIONINE, ISOLEUCINE, VALINE, HISTIDINE, THREONINE, TRYPTOPHAN, ALANINE, GLYCINE, ARGININE, PROLINE, SERINE, TYROSINE, SODIUM ACETATE, DIBASIC POTASSIUM PHOSPHATE, MAGNESIUM CHLORIDE, SODIUM CHLORIDE, CALCIUM CHLORIDE, DEXTROSE SCH MLS/HR
365; 280; 290; 200; 300; 290; 240; 210; 90; 1035; 515; 575; 340; 250; 20; 340; 261; 51; 59; 33; 20 INJECTION INTRAVENOUS at 10:47

## 2022-09-14 RX ADMIN — HYDROMORPHONE HYDROCHLORIDE PRN MG: 1 INJECTION, SOLUTION INTRAMUSCULAR; INTRAVENOUS; SUBCUTANEOUS at 18:12

## 2022-09-14 RX ADMIN — CEFAZOLIN SCH: 330 INJECTION, POWDER, FOR SOLUTION INTRAMUSCULAR; INTRAVENOUS at 16:43

## 2022-09-14 RX ADMIN — PANTOPRAZOLE SODIUM SCH MG: 40 INJECTION, POWDER, FOR SOLUTION INTRAVENOUS at 09:08

## 2022-09-14 RX ADMIN — DEXTROSE MONOHYDRATE, SODIUM CHLORIDE, AND POTASSIUM CHLORIDE SCH: 50; 4.5; 1.49 INJECTION, SOLUTION INTRAVENOUS at 05:52

## 2022-09-14 RX ADMIN — SIMETHICONE SCH MG: 20 SUSPENSION/ DROPS ORAL at 22:27

## 2022-09-14 RX ADMIN — HYDROCODONE BITARTRATE AND ACETAMINOPHEN PRN EACH: 5; 325 TABLET ORAL at 09:06

## 2022-09-14 RX ADMIN — HYDROMORPHONE HYDROCHLORIDE PRN MG: 1 INJECTION, SOLUTION INTRAMUSCULAR; INTRAVENOUS; SUBCUTANEOUS at 06:11

## 2022-09-14 RX ADMIN — HYDROCODONE BITARTRATE AND ACETAMINOPHEN PRN EACH: 5; 325 TABLET ORAL at 13:41

## 2022-09-14 RX ADMIN — PIPERACILLIN AND TAZOBACTAM SCH MLS/HR: 3; .375 INJECTION, POWDER, FOR SOLUTION INTRAVENOUS at 12:24

## 2022-09-14 RX ADMIN — SIMETHICONE SCH: 20 SUSPENSION/ DROPS ORAL at 12:25

## 2022-09-14 RX ADMIN — HYDROMORPHONE HYDROCHLORIDE PRN MG: 1 INJECTION, SOLUTION INTRAMUSCULAR; INTRAVENOUS; SUBCUTANEOUS at 01:49

## 2022-09-14 RX ADMIN — HYDROMORPHONE HYDROCHLORIDE PRN MG: 1 INJECTION, SOLUTION INTRAMUSCULAR; INTRAVENOUS; SUBCUTANEOUS at 10:45

## 2022-09-14 RX ADMIN — HYDROCODONE BITARTRATE AND ACETAMINOPHEN PRN EACH: 5; 325 TABLET ORAL at 20:20

## 2022-09-14 RX ADMIN — ENOXAPARIN SODIUM SCH MG: 40 INJECTION SUBCUTANEOUS at 09:07

## 2022-09-14 RX ADMIN — HYDROCODONE BITARTRATE AND ACETAMINOPHEN PRN EACH: 5; 325 TABLET ORAL at 05:10

## 2022-09-14 RX ADMIN — PIPERACILLIN AND TAZOBACTAM SCH MLS/HR: 3; .375 INJECTION, POWDER, FOR SOLUTION INTRAVENOUS at 21:04

## 2022-09-14 RX ADMIN — LEUCINE, PHENYLALANINE, LYSINE, METHIONINE, ISOLEUCINE, VALINE, HISTIDINE, THREONINE, TRYPTOPHAN, ALANINE, GLYCINE, ARGININE, PROLINE, SERINE, TYROSINE, SODIUM ACETATE, DIBASIC POTASSIUM PHOSPHATE, MAGNESIUM CHLORIDE, SODIUM CHLORIDE, CALCIUM CHLORIDE, DEXTROSE SCH MLS/HR
365; 280; 290; 200; 300; 290; 240; 210; 90; 1035; 515; 575; 340; 250; 20; 340; 261; 51; 59; 33; 20 INJECTION INTRAVENOUS at 01:31

## 2022-09-14 RX ADMIN — PIPERACILLIN AND TAZOBACTAM SCH MLS/HR: 3; .375 INJECTION, POWDER, FOR SOLUTION INTRAVENOUS at 03:47

## 2022-09-14 RX ADMIN — SIMETHICONE SCH: 20 SUSPENSION/ DROPS ORAL at 18:38

## 2022-09-14 RX ADMIN — IBUPROFEN PRN MG: 600 TABLET ORAL at 19:37

## 2022-09-14 RX ADMIN — HYDROCODONE BITARTRATE AND ACETAMINOPHEN PRN EACH: 5; 325 TABLET ORAL at 00:54

## 2022-09-14 RX ADMIN — SIMETHICONE SCH: 20 SUSPENSION/ DROPS ORAL at 10:50

## 2022-09-14 NOTE — P.PN
Subjective


Progress Note Date: 09/14/22


Principal diagnosis: 





abdominal pain





Hospital Course: 


41-year-old malein significant past medical history presenting with sepsis 

secondary to acute perforated sigmoid diverticulitis.  Patient has postop day 5 

status post sigmoid colectomy with end colostomy.  Infectious disease also 

following patient.  Patient remains on Zosyn with clinical improvement.  

Currently tolerating clear liquids, advancing to full liquids. 








Subjective: 


Patient seen and examined at bedside.  No acute events overnight.  Patient 

claims that his oral intake is slowly improving. Abdominal pain improving. His 

feet are more swollen today, with worsening pain. He has had gout flares in the 

past, which have felt similar to this. He denies any chest pain or shortness of 

breath or urinary complaints.





Pertinent positives and negatives as discussed above, a complete review of 

systems was performed and all other systems are negative.








Vitals Signs Reviewed. 





General: nontoxic, no distress, appears at stated age


Derm: warm, dry


Head: atraumatic, normocephalic, symmetric


Eyes: EOMI, no lid lag, anicteric sclera


Mouth: no lip lesion, mucus membranes moist


Cardiovascular: S1S2 reg, no murmur


Lungs: CTA bilateral, no rhonchi, no rales , no accessory muscle use


Abdominal: soft, mild tenderness to palpation all quadrants, no guarding, no 

appreciable organomegaly, midline incision covered in dressing, left sided 

colostomy with stool


Ext: no gross muscle atrophy, no contractures, bilateral dorsal foot tenderness 

and swelling, no MTP swelling bilaterally.


Neuro:  CN II-XI grossly intact, no focal neuro deficits


Psych: Alert, oriented, appropriate affect 








Assessment and Plan:





Sepsis


Perforated sigmoid diverticulitis with pneumoperitoneum


Leukocytosis


-Status post sigmoid colectomy with end colostomy on 9/7


-Surgery following


-Infectious disease following


-Blood cultures negative


-Continue Zosyn, improving clinically


-Slowly advance diet, on full liquids


-Pain control





Feet pain and edema, bilateral


- gout vs pseudogout vs immobilization vs venous stasis 


- xrays pending 


- ibuprofen for pain





Impaired perfusion to the right upper pole kidney, seen on CT 


-Evaluated by urology, no further management needed





Morbid obesity


-Counseled regarding lifestyle modifications


-Needs outpatient follow





Generalized weakness


-PT/OT








F: D5 half-normal at 80 mL an hour


E: Replete as needed


N: full liquids


A: As tolerated





DVT ppx: Lovenox





Code status: Full code





Anticipated discharge place: Home


Anticipated discharge time: 2-3 days





Objective





- Vital Signs


Vital signs: 


                                   Vital Signs











Temp  97.6 F   09/14/22 07:52


 


Pulse  91   09/14/22 07:52


 


Resp  16   09/14/22 07:52


 


BP  135/78   09/14/22 07:52


 


Pulse Ox  96   09/14/22 07:52


 


FiO2      








                                 Intake & Output











 09/13/22 09/14/22 09/14/22





 18:59 06:59 18:59


 


Intake Total 1000 1011 


 


Output Total 670 1895 1100


 


Balance 330 -884 -1100


 


Intake:   


 


  Intake, IV Titration 1000 1011 





  Amount   


 


    Amino Acid 5%-D20w+Lytes* 1000  





    E* 1,000 ml @ 100 mls/hr   





    IV .BY DURATION [facility] Rx#:   





    [id number]   


 


    Mvi, Adult No.4 with Vit  1011 





    K 10 ml Trace (Conc-1Ml/   





    Dose) 1 ml In Amino Acid   





    5%-D20w+Lytes*E* 1,000 ml   





    @ 100 mls/hr IV .BY   





    DURATION [facility] Rx#:   





    [id number]   


 


Output:   


 


  Drainage 20 20 


 


    Right Abdomen 20 20 


 


  Urine 650 1875 1100


 


Other:   


 


  Voiding Method Toilet Toilet Urinal





 Urinal Urinal 


 


  # Voids 3 3 2














- Labs


CBC & Chem 7: 


                                 09/14/22 06:07





                                 09/13/22 06:11


Labs: 


                  Abnormal Lab Results - Last 24 Hours (Table)











  09/13/22 09/13/22 09/14/22 Range/Units





  06:11 17:11 00:03 


 


WBC     (4.50-10.00)  X 10*3/uL


 


RBC     (4.40-5.60)  X 10*6/uL


 


Hgb     (13.0-17.0)  g/dL


 


Hct     (39.6-50.0)  %


 


Plt Count     (140-440)  X 10*3/uL


 


Immature Gran #     (0.00-0.04)  X 10*3/uL


 


Neutrophils #     (1.80-7.70)  X 10*3/uL


 


Monocytes #     (0.20-1.00)  X 10*3/uL


 


Eosinophils #     (0.04-0.35)  X 10*3/uL


 


Glucose  120 H    ()  mg/dL


 


POC Glucose (mg/dL)   125 H  137 H  ()  mg/dL














  09/14/22 09/14/22 Range/Units





  05:17 06:07 


 


WBC   15.36 H  (4.50-10.00)  X 10*3/uL


 


RBC   4.07 L  (4.40-5.60)  X 10*6/uL


 


Hgb   12.4 L  (13.0-17.0)  g/dL


 


Hct   37.7 L  (39.6-50.0)  %


 


Plt Count   466 H  (140-440)  X 10*3/uL


 


Immature Gran #   0.45 H  (0.00-0.04)  X 10*3/uL


 


Neutrophils #   11.74 H  (1.80-7.70)  X 10*3/uL


 


Monocytes #   1.16 H  (0.20-1.00)  X 10*3/uL


 


Eosinophils #   0.38 H  (0.04-0.35)  X 10*3/uL


 


Glucose    ()  mg/dL


 


POC Glucose (mg/dL)  128 H   ()  mg/dL

## 2022-09-14 NOTE — P.PN
Subjective


Progress Note Date: 09/14/22





CHIEF COMPLAINT: Perforated Diverticulitis





HISTORY OF PRESENT ILLNESS: Patient is postop day #6 status post sigmoid 

colectomy with end colostomy for perforated sigmoid diverticulitis.  Patient 

complains that his feet hurt worse then his abdominal pain.  He reports 

improvement in his abdominal pain.  Denies any nausea vomiting.  He is currently

on a full liquid diet.  However, he is mostly eating only the clear liquids he 

does not tolerate dairy products.  Again he is complaining of pain on the dorsal

aspects of his feet.  He reports that it's similar to his previous gout attacks.

 He was supposed to be started on allopurinol in the outpatient setting for an 

elevated uric acid level.  BRYSON drain 20 mL serosanguineous output this morning.  

Afebrile.  WBC trending downwards 15.36 Hgb 12.4 platelets 466 creatinine 1.0 

CRP 5.90





PHYSICAL EXAM: 


VITAL SIGNS: Reviewed.


GENERAL: Well-developed in no acute distress. 


ABDOMEN:  Soft.  Nondistended.  Incision clean dry and intact.  3 abdominal 

katherin with St serosanguineous drainage.  ipneda pink stool and air noted in ostomy 

bag


NEUROLOGIC: Alert and oriented. Cranial nerves II through XII grossly intact.


Extremities: patient is tenderness to palpation of the dorsal aspect of his 

feet.  There is some erythema noted more on the right than the left.  Minimal 

swelling.  No calf tenderness.  





ASSESSMENT: 


1.  Perforated sigmoid diverticulitis status post sigmoid colectomy with end 

colostomy


2.  Leukocytosis


3.  Moderate protein calorie malnutrition





PLAN: 


-Continue Full Liquid diet with no dairy products


-Check uric acid level 


-Notified medicine service regarding possible gout flare up.  Will await their 

further recommendations 


-Continue pain medication 


-Continue IV antibiotics


-Encouraged patient to increase activity level


-Encouraged patient to use incentive spirometer


-GI prophylaxis Protonix and DVT prophylaxis subcu heparin





Physician Assistant note has been reviewed by physician. Signing provider agrees

with the documented findings, assessment, and plan of care. 





I have personally seen and examined the patient, reviewed the NP /PAs history, 

exam and MDM and agree with the assessment and plan as written. Based on total 

visit time, I have performed more than 50% of the visit.





As above:  Patient complaining of bilateral foot pain.  He states this is very 

similar to previous attacks of gout that he has had.  Denies calf or thigh 

discomfort.  Tolerating diet.  We'll advance diet.  Initiate gout treatment.





Objective





- Vital Signs


Vital signs: 


                                   Vital Signs











Temp  97.6 F   09/14/22 07:52


 


Pulse  91   09/14/22 07:52


 


Resp  16   09/14/22 07:52


 


BP  135/78   09/14/22 07:52


 


Pulse Ox  96   09/14/22 07:52


 


FiO2      








                                 Intake & Output











 09/13/22 09/14/22 09/14/22





 18:59 06:59 18:59


 


Intake Total 1000 1011 


 


Output Total 670 1895 1100


 


Balance 330 -884 -1100


 


Intake:   


 


  Intake, IV Titration 1000 1011 





  Amount   


 


    Amino Acid 5%-D20w+Lytes* 1000  





    E* 1,000 ml @ 100 mls/hr   





    IV .BY DURATION [facility] Rx#:   





    [id number]   


 


    Mvi, Adult No.4 with Vit  1011 





    K 10 ml Trace (Conc-1Ml/   





    Dose) 1 ml In Amino Acid   





    5%-D20w+Lytes*E* 1,000 ml   





    @ 100 mls/hr IV .BY   





    DURATION [facility] Rx#:   





    [id number]   


 


Output:   


 


  Drainage 20 20 


 


    Right Abdomen 20 20 


 


  Urine 650 1875 1100


 


Other:   


 


  Voiding Method Toilet Toilet Urinal





 Urinal Urinal 


 


  # Voids 3 3 2














- Labs


CBC & Chem 7: 


                                 09/14/22 06:07





                                 09/14/22 06:07


Labs: 


                  Abnormal Lab Results - Last 24 Hours (Table)











  09/13/22 09/13/22 09/14/22 Range/Units





  06:11 17:11 00:03 


 


WBC     (4.50-10.00)  X 10*3/uL


 


RBC     (4.40-5.60)  X 10*6/uL


 


Hgb     (13.0-17.0)  g/dL


 


Hct     (39.6-50.0)  %


 


Plt Count     (140-440)  X 10*3/uL


 


Immature Gran #     (0.00-0.04)  X 10*3/uL


 


Neutrophils #     (1.80-7.70)  X 10*3/uL


 


Monocytes #     (0.20-1.00)  X 10*3/uL


 


Eosinophils #     (0.04-0.35)  X 10*3/uL


 


Glucose  120 H    ()  mg/dL


 


POC Glucose (mg/dL)   125 H  137 H  ()  mg/dL


 


Magnesium     (1.5-2.4)  mg/dL


 


C-Reactive Protein     (0.00-0.80)  mg/dL














  09/14/22 09/14/22 09/14/22 Range/Units





  05:17 06:07 06:07 


 


WBC    15.36 H  (4.50-10.00)  X 10*3/uL


 


RBC    4.07 L  (4.40-5.60)  X 10*6/uL


 


Hgb    12.4 L  (13.0-17.0)  g/dL


 


Hct    37.7 L  (39.6-50.0)  %


 


Plt Count    466 H  (140-440)  X 10*3/uL


 


Immature Gran #    0.45 H  (0.00-0.04)  X 10*3/uL


 


Neutrophils #    11.74 H  (1.80-7.70)  X 10*3/uL


 


Monocytes #    1.16 H  (0.20-1.00)  X 10*3/uL


 


Eosinophils #    0.38 H  (0.04-0.35)  X 10*3/uL


 


Glucose   138 H   ()  mg/dL


 


POC Glucose (mg/dL)  128 H    ()  mg/dL


 


Magnesium   2.6 H   (1.5-2.4)  mg/dL


 


C-Reactive Protein   5.90 H   (0.00-0.80)  mg/dL

## 2022-09-14 NOTE — XR
EXAMINATION TYPE: XR foot complete bilateral

 

DATE OF EXAM: 9/14/2022 11:32 AM

 

INDICATION: 

Patient age:Male;  41 years old; 

Reason for study: swelling and pain;

 

COMPARISON: None

 

TECHNIQUE: Bilateral feet were examined in the AP, oblique, and lateral projections.  

 

FINDINGS: 

Left: No evidence of any acute osseous pathology.  . Joints are preserved.

Accessory ossicle next to the cuboid bone on the left. Calcaneal Achilles enthesophyte and plantar sp
urring present. Soft tissue swelling around the foot is noted most pronounced in the dorsal aspect.

 

Right: No evidence of any acute osseous pathology.  No evidence of soft tissue swelling. Joints are p
reserved.

Accessory ossicle next to the cuboid bone on the left. Calcaneal Achilles enthesophyte and plantar sp
urring present.

 

IMPRESSION: 

1.  No evidence of acute fracture.

2.  Soft tissue swelling of the feet.

## 2022-09-15 LAB
ANION GAP SERPL CALC-SCNC: 12 MMOL/L
BASOPHILS # BLD AUTO: 0.1 X 10*3/UL (ref 0–0.1)
BASOPHILS NFR BLD AUTO: 0.7 %
BUN SERPL-SCNC: 16 MG/DL (ref 9–20)
CALCIUM SPEC-MCNC: 8.9 MG/DL (ref 8.4–10.2)
CHLORIDE SERPL-SCNC: 96 MMOL/L (ref 98–107)
CO2 SERPL-SCNC: 27 MMOL/L (ref 22–30)
EOSINOPHIL # BLD AUTO: 0.37 X 10*3/UL (ref 0.04–0.35)
EOSINOPHIL NFR BLD AUTO: 2.5 %
ERYTHROCYTE [DISTWIDTH] IN BLOOD BY AUTOMATED COUNT: 4.04 X 10*6/UL (ref 4.4–5.6)
ERYTHROCYTE [DISTWIDTH] IN BLOOD: 13.3 % (ref 11.5–14.5)
GLUCOSE BLD-MCNC: 108 MG/DL (ref 70–110)
GLUCOSE BLD-MCNC: 131 MG/DL (ref 70–110)
GLUCOSE BLD-MCNC: 146 MG/DL (ref 70–110)
GLUCOSE BLD-MCNC: 148 MG/DL (ref 70–110)
GLUCOSE SERPL-MCNC: 147 MG/DL (ref 74–99)
HCT VFR BLD AUTO: 37.8 % (ref 39.6–50)
HGB BLD-MCNC: 12.3 G/DL (ref 13–17)
IMM GRANULOCYTES BLD QL AUTO: 3.1 %
LYMPHOCYTES # SPEC AUTO: 1.45 X 10*3/UL (ref 0.9–5)
LYMPHOCYTES NFR SPEC AUTO: 9.9 %
MAGNESIUM SPEC-SCNC: 2.1 MG/DL (ref 1.6–2.3)
MCH RBC QN AUTO: 30.4 PG (ref 27–32)
MCHC RBC AUTO-ENTMCNC: 32.5 G/DL (ref 32–37)
MCV RBC AUTO: 93.6 FL (ref 80–97)
MONOCYTES # BLD AUTO: 1.39 X 10*3/UL (ref 0.2–1)
MONOCYTES NFR BLD AUTO: 9.5 %
NEUTROPHILS # BLD AUTO: 10.84 X 10*3/UL (ref 1.8–7.7)
NEUTROPHILS NFR BLD AUTO: 74.3 %
NRBC BLD AUTO-RTO: 0 /100 WBCS (ref 0–0)
PLATELET # BLD AUTO: 422 X 10*3/UL (ref 140–440)
POTASSIUM SERPL-SCNC: 4.6 MMOL/L (ref 3.5–5.1)
SODIUM SERPL-SCNC: 135 MMOL/L (ref 137–145)
WBC # BLD AUTO: 14.61 X 10*3/UL (ref 4.5–10)

## 2022-09-15 RX ADMIN — HYDROCODONE BITARTRATE AND ACETAMINOPHEN PRN EACH: 5; 325 TABLET ORAL at 15:28

## 2022-09-15 RX ADMIN — SIMETHICONE SCH: 20 SUSPENSION/ DROPS ORAL at 10:04

## 2022-09-15 RX ADMIN — HYDROCODONE BITARTRATE AND ACETAMINOPHEN PRN EACH: 5; 325 TABLET ORAL at 22:24

## 2022-09-15 RX ADMIN — HYDROMORPHONE HYDROCHLORIDE PRN MG: 1 INJECTION, SOLUTION INTRAMUSCULAR; INTRAVENOUS; SUBCUTANEOUS at 18:56

## 2022-09-15 RX ADMIN — HYDROCODONE BITARTRATE AND ACETAMINOPHEN PRN EACH: 5; 325 TABLET ORAL at 18:48

## 2022-09-15 RX ADMIN — PIPERACILLIN AND TAZOBACTAM SCH MLS/HR: 3; .375 INJECTION, POWDER, FOR SOLUTION INTRAVENOUS at 04:09

## 2022-09-15 RX ADMIN — HYDROMORPHONE HYDROCHLORIDE PRN MG: 1 INJECTION, SOLUTION INTRAMUSCULAR; INTRAVENOUS; SUBCUTANEOUS at 07:32

## 2022-09-15 RX ADMIN — HYDROMORPHONE HYDROCHLORIDE PRN MG: 1 INJECTION, SOLUTION INTRAMUSCULAR; INTRAVENOUS; SUBCUTANEOUS at 04:09

## 2022-09-15 RX ADMIN — DEXTROSE MONOHYDRATE, SODIUM CHLORIDE, AND POTASSIUM CHLORIDE SCH: 50; 4.5; 1.49 INJECTION, SOLUTION INTRAVENOUS at 06:49

## 2022-09-15 RX ADMIN — SIMETHICONE SCH: 20 SUSPENSION/ DROPS ORAL at 18:03

## 2022-09-15 RX ADMIN — IBUPROFEN SCH MG: 800 TABLET, FILM COATED ORAL at 18:48

## 2022-09-15 RX ADMIN — HYDROMORPHONE HYDROCHLORIDE PRN MG: 1 INJECTION, SOLUTION INTRAMUSCULAR; INTRAVENOUS; SUBCUTANEOUS at 13:13

## 2022-09-15 RX ADMIN — HYDROCODONE BITARTRATE AND ACETAMINOPHEN PRN EACH: 5; 325 TABLET ORAL at 02:06

## 2022-09-15 RX ADMIN — LEUCINE, PHENYLALANINE, LYSINE, METHIONINE, ISOLEUCINE, VALINE, HISTIDINE, THREONINE, TRYPTOPHAN, ALANINE, GLYCINE, ARGININE, PROLINE, SERINE, TYROSINE, DEXTROSE SCH: 311; 238; 247; 170; 255; 247; 204; 179; 77; 880; 438; 489; 289; 213; 17; 10 INJECTION INTRAVENOUS at 16:38

## 2022-09-15 RX ADMIN — HYDROCODONE BITARTRATE AND ACETAMINOPHEN PRN EACH: 5; 325 TABLET ORAL at 06:12

## 2022-09-15 RX ADMIN — SIMETHICONE SCH MG: 20 SUSPENSION/ DROPS ORAL at 22:23

## 2022-09-15 RX ADMIN — PANTOPRAZOLE SODIUM SCH MG: 40 INJECTION, POWDER, FOR SOLUTION INTRAVENOUS at 09:56

## 2022-09-15 RX ADMIN — LEUCINE, PHENYLALANINE, LYSINE, METHIONINE, ISOLEUCINE, VALINE, HISTIDINE, THREONINE, TRYPTOPHAN, ALANINE, GLYCINE, ARGININE, PROLINE, SERINE, TYROSINE, DEXTROSE SCH: 311; 238; 247; 170; 255; 247; 204; 179; 77; 880; 438; 489; 289; 213; 17; 10 INJECTION INTRAVENOUS at 16:39

## 2022-09-15 RX ADMIN — LEUCINE, PHENYLALANINE, LYSINE, METHIONINE, ISOLEUCINE, VALINE, HISTIDINE, THREONINE, TRYPTOPHAN, ALANINE, GLYCINE, ARGININE, PROLINE, SERINE, TYROSINE, SODIUM ACETATE, DIBASIC POTASSIUM PHOSPHATE, MAGNESIUM CHLORIDE, SODIUM CHLORIDE, CALCIUM CHLORIDE, DEXTROSE SCH
365; 280; 290; 200; 300; 290; 240; 210; 90; 1035; 515; 575; 340; 250; 20; 340; 261; 51; 59; 33; 20 INJECTION INTRAVENOUS at 06:48

## 2022-09-15 RX ADMIN — DEXTROSE MONOHYDRATE, SODIUM CHLORIDE, AND POTASSIUM CHLORIDE SCH: 50; 4.5; 1.49 INJECTION, SOLUTION INTRAVENOUS at 16:40

## 2022-09-15 RX ADMIN — ONDANSETRON PRN MG: 2 INJECTION INTRAMUSCULAR; INTRAVENOUS at 04:09

## 2022-09-15 RX ADMIN — METHYLPREDNISOLONE SODIUM SUCCINATE SCH MG: 125 INJECTION, POWDER, FOR SOLUTION INTRAMUSCULAR; INTRAVENOUS at 15:26

## 2022-09-15 RX ADMIN — CEFAZOLIN SCH: 330 INJECTION, POWDER, FOR SOLUTION INTRAMUSCULAR; INTRAVENOUS at 18:01

## 2022-09-15 RX ADMIN — METHYLPREDNISOLONE SODIUM SUCCINATE SCH MG: 125 INJECTION, POWDER, FOR SOLUTION INTRAMUSCULAR; INTRAVENOUS at 22:23

## 2022-09-15 RX ADMIN — FLUCONAZOLE SCH MG: 100 TABLET ORAL at 11:02

## 2022-09-15 RX ADMIN — IBUPROFEN PRN MG: 600 TABLET ORAL at 09:55

## 2022-09-15 RX ADMIN — PIPERACILLIN AND TAZOBACTAM SCH MLS/HR: 3; .375 INJECTION, POWDER, FOR SOLUTION INTRAVENOUS at 21:23

## 2022-09-15 RX ADMIN — ENOXAPARIN SODIUM SCH MG: 40 INJECTION SUBCUTANEOUS at 09:56

## 2022-09-15 RX ADMIN — HYDROCODONE BITARTRATE AND ACETAMINOPHEN PRN EACH: 5; 325 TABLET ORAL at 11:00

## 2022-09-15 RX ADMIN — LEUCINE, PHENYLALANINE, LYSINE, METHIONINE, ISOLEUCINE, VALINE, HISTIDINE, THREONINE, TRYPTOPHAN, ALANINE, GLYCINE, ARGININE, PROLINE, SERINE, TYROSINE, DEXTROSE SCH MLS/HR: 311; 238; 247; 170; 255; 247; 204; 179; 77; 880; 438; 489; 289; 213; 17; 10 INJECTION INTRAVENOUS at 07:33

## 2022-09-15 RX ADMIN — SIMETHICONE SCH: 20 SUSPENSION/ DROPS ORAL at 12:19

## 2022-09-15 RX ADMIN — HYDROMORPHONE HYDROCHLORIDE PRN MG: 1 INJECTION, SOLUTION INTRAMUSCULAR; INTRAVENOUS; SUBCUTANEOUS at 00:16

## 2022-09-15 RX ADMIN — DOCUSATE SODIUM SCH MG: 100 CAPSULE, LIQUID FILLED ORAL at 09:56

## 2022-09-15 RX ADMIN — PIPERACILLIN AND TAZOBACTAM SCH MLS/HR: 3; .375 INJECTION, POWDER, FOR SOLUTION INTRAVENOUS at 12:18

## 2022-09-15 RX ADMIN — DOCUSATE SODIUM SCH MG: 100 CAPSULE, LIQUID FILLED ORAL at 21:24

## 2022-09-15 NOTE — P.CNOR
History of Present Illness





- [facility]


Consult date: 09/15/22


Consult reason: joint pain (Left foot pain and swelling)


History of present illness: 





Patient is a 41-year-old male who has been admitted to [facility] for about 2 weeks with regards to abdominal issues.  He is being 

followed by multiple medical specialties.  Patient did note today he has had fabiola

e discomfort in the ankle/foot region along with swelling.  X-ray of the foot 

was done, our orthopedic team was consulted.





Patient was evaluated today at bedside, he is resting comfortably in his hos

pital bed.  Patient states that he's had no recent trauma, like stated above 

he's been in the hospital later that her about 2 weeks with significant 

abdominal issues.  Patient states he does have a previous history of gout.  He 

does not take allopurinol currently.  He has a holistic-type medication that he 

does utilize on occasion.  Patient states that the pain initially was in the 

ankle and migrated to the top of the foot and then has progressed to the big 

toe.  Patient notes it more on the left lower extremity than the right lower 

extremity.  States that prior to his hospital admission he was dealing with some

gout in his left knee.  Patient denies any previous orthopedic surgery to the le

ft foot/ankle.  Besides the swelling and discomfort in the foot he has no other 

orthopedic complaints at this time.





Review of Systems


Constitutional: Reports as per [facility]





Past Medical History


Past Medical History: No Reported History


History of Any Multi-Drug Resistant Organisms: None Reported


Past Surgical History: Cholecystectomy, Orthopedic Surgery


Additional Past Surgical History / Comment(s): sinus


Past Anesthesia/Blood Transfusion Reactions: No Reported Reaction


Additional Past Anesthesia/Blood Transfusion Reaction / Comm: Nauea


Past Psychological History: No Psychological Hx Reported


Smoking Status: Never smoker


Past Alcohol Use History: None Reported


Past Drug Use History: None Reported





Medications and Allergies


                                Home Medications











 Medication  Instructions  Recorded  Confirmed  Type


 


Omeprazole 20 mg PO DAILY 06/14/21 08/31/22 History


 


Amoxic-Pot Clav 875-125Mg 1 tab PO Q12HR #20 tab 08/30/22 08/31/22 Rx





[Augmentin 875-125]    


 


Ondansetron Odt [Zofran Odt] 4 mg PO Q8HR PRN #10 tab 08/30/22 08/31/22 Rx


 


Testosterone Cypionate 200 mg IM Q14D 08/30/22 08/31/22 History





[Depo-Testosterone]    








                                    Allergies











Allergy/AdvReac Type Severity Reaction Status Date / Time


 


No Known Allergies Allergy   Verified 08/31/22 16:06














Physical Examination





Left lower extremity:





Obvious open lesions or sores are visualized throughout the extremity


There is some mild erythema noted on the dorsum of the foot and near the great 

big toe.  There is some mild soft tissue swelling in the dorsum of the midfoot 

and forefoot.


No obvious fluctuance is appreciated on exam surrounding the ankle and foot on 

the left side





Patient demonstrates mild tenderness with palpation on the dorsum of the foot, 

he does demonstrate significant tenderness with palpation of the great toe.  

Patient is nontender with palpation to the medial and lateral malleolus, is 

nontender with palpation to the lower leg and knee





Passive range of motion of the hip and knee reproduced no pain.  Plantar flexion

dorsiflexion are intact, minimal discomfort is reproduced.  EHL and FHL are 

intact, there is pain with movement





Calf is soft, no tenderness with palpation 





Sensory exam to light touch is intact throughout the extremity, dorsalis pedis 

pulses 2+








Results





- Labs


Labs: 


                  Abnormal Lab Results - Last 24 Hours (Table)











  09/14/22 09/14/22 09/15/22 Range/Units





  06:07 18:06 00:13 


 


WBC     (4.50-10.00)  X 10*3/uL


 


RBC     (4.40-5.60)  X 10*6/uL


 


Hgb     (13.0-17.0)  g/dL


 


Hct     (39.6-50.0)  %


 


Immature Gran #     (0.00-0.04)  X 10*3/uL


 


Neutrophils #     (1.80-7.70)  X 10*3/uL


 


Monocytes #     (0.20-1.00)  X 10*3/uL


 


Eosinophils #     (0.04-0.35)  X 10*3/uL


 


Sodium     (137-145)  mmol/L


 


Chloride     ()  mmol/L


 


Glucose     (74-99)  mg/dL


 


POC Glucose (mg/dL)   128 H  146 H  ()  mg/dL


 


Uric Acid  3.0 L    (3.7-8.7)  mg/dL














  09/15/22 09/15/22 09/15/22 Range/Units





  06:14 07:29 07:29 


 


WBC   14.61 H   (4.50-10.00)  X 10*3/uL


 


RBC   4.04 L   (4.40-5.60)  X 10*6/uL


 


Hgb   12.3 L   (13.0-17.0)  g/dL


 


Hct   37.8 L   (39.6-50.0)  %


 


Immature Gran #   0.46 H   (0.00-0.04)  X 10*3/uL


 


Neutrophils #   10.84 H   (1.80-7.70)  X 10*3/uL


 


Monocytes #   1.39 H   (0.20-1.00)  X 10*3/uL


 


Eosinophils #   0.37 H   (0.04-0.35)  X 10*3/uL


 


Sodium    135 L  (137-145)  mmol/L


 


Chloride    96 L  ()  mmol/L


 


Glucose    147 H  (74-99)  mg/dL


 


POC Glucose (mg/dL)  131 H    ()  mg/dL


 


Uric Acid     (3.7-8.7)  mg/dL














  09/15/22 Range/Units





  11:35 


 


WBC   (4.50-10.00)  X 10*3/uL


 


RBC   (4.40-5.60)  X 10*6/uL


 


Hgb   (13.0-17.0)  g/dL


 


Hct   (39.6-50.0)  %


 


Immature Gran #   (0.00-0.04)  X 10*3/uL


 


Neutrophils #   (1.80-7.70)  X 10*3/uL


 


Monocytes #   (0.20-1.00)  X 10*3/uL


 


Eosinophils #   (0.04-0.35)  X 10*3/uL


 


Sodium   (137-145)  mmol/L


 


Chloride   ()  mmol/L


 


Glucose   (74-99)  mg/dL


 


POC Glucose (mg/dL)  148 H  ()  mg/dL


 


Uric Acid   (3.7-8.7)  mg/dL








                                      H & H











  08/31/22 09/01/22 09/02/22 Range/Units





  14:43 06:21 06:48 


 


Hgb  14.6  13.6  13.2  (13.0-17.5)  gm/dL


 


Hct  45.7  41.9  40.8  (39.0-53.0)  %














  09/03/22 09/04/22 09/05/22 Range/Units





  06:54 05:16 05:18 


 


Hgb  11.9 L  12.1 L  12.7 L  (13.0-17.5)  gm/dL


 


Hct  36.1 L  36.1 L  38.4 L  (39.0-53.0)  %














  09/06/22 09/07/22 09/08/22 Range/Units





  06:45 05:27 08:08 


 


Hgb  12.8 L  13.3  13.1  (13.0-17.5)  gm/dL


 


Hct  38.1 L  38.8 L  38.9 L  (39.0-53.0)  %














  09/09/22 09/11/22 09/12/22 Range/Units





  05:34 06:06 05:14 


 


Hgb  12.7 L  13.0  13.1  (13.0-17.5)  gm/dL


 


Hct  38.5 L  38.8 L  38.9 L  (39.0-53.0)  %














  09/13/22 09/14/22 09/15/22 Range/Units





  06:11 06:07 07:29 


 


Hgb  13.0  12.4 L  12.3 L  (13.0-17.5)  gm/dL


 


Hct  40.7  37.7 L  37.8 L  (39.0-53.0)  %








                                   Coagulation











  09/01/22 Range/Units





  09:00 


 


INR  1.2 H  (<1.2)  











Result Diagrams: 


                                 09/15/22 07:29





                                 09/15/22 07:29





- Diagnostic results


Ankle/Foot x-ray: report reviewed, image reviewed (Images and reports reviewed 

of the left foot.  Images demonstrated no acute fractures or dislocations.  No 

acute osseous abnormalities appreciated.)





Assessment and Plan


Assessment: 





Left foot pain





Left foot swelling





Left foot gouty arthropathy





Other medical comorbidities


Plan: 





I was able to discuss the case, this including both physical exam findings and 

imaging studies with my attending Dr. Chacon.  No orthopedic surgical 

intervention is recommended at this time 





This patient's recent abdominal surgery, defer medical treatment to primary 

team.  Patient states that he has taken a few doses of ibuprofen which seemed to

have helped significantly. 





Weight-bear as tolerated





Ice and elevate the extremity to help alleviate symptoms





GI and DVT prophylaxis per primary medical service





Other medical specialist and recommendations





Orthopedically patient is stable, please contact our service with any questions 

regarding patient.





 


Time with Patient: Less than 30

## 2022-09-15 NOTE — P.PN
Subjective


Progress Note Date: 09/15/22


Principal diagnosis: 





abdominal pain





Hospital Course: 


41-year-old male without significant past medical history presenting with sepsis

secondary to acute perforated sigmoid diverticulitis.  Patient is status post 

sigmoid colectomy with end colostomy.  Infectious disease also following 

patient.  Patient remains on Zosyn with clinical improvement.  Currently to

lerating full liquids. 








Subjective: 


Patient seen and examined at bedside.  No acute events overnight.  Patient 

claims that his oral intake is slowly improving. Abdominal pain improving. He 

continues to complain about foot pain L>R. He is having trouble ambulating due 

to pain. He denies any chest pain or shortness of breath or urinary complaints.





Pertinent positives and negatives as discussed above, a complete review of 

systems was performed and all other systems are negative.








Vitals Signs Reviewed. 





General: nontoxic, no distress, appears at stated age


Derm: warm, dry


Head: atraumatic, normocephalic, symmetric


Eyes: EOMI, no lid lag, anicteric sclera


Mouth: no lip lesion, mucus membranes moist


Cardiovascular: S1S2 reg, no murmur


Lungs: CTA bilateral, no rhonchi, no rales , no accessory muscle use


Abdominal: soft, mild tenderness to palpation all quadrants, no guarding, no 

appreciable organomegaly, midline incision covered in dressing, left sided 

colostomy with stool


Ext: no gross muscle atrophy, no contractures, bilateral dorsal foot tenderness 

and swelling, no MTP swelling bilaterally


Neuro:  CN II-XI grossly intact, no focal neuro deficits


Psych: Alert, oriented, appropriate affect 








Assessment and Plan:





Sepsis


Perforated sigmoid diverticulitis with pneumoperitoneum


Leukocytosis


-Status post sigmoid colectomy with end colostomy on 9/7


-Surgery following


-Infectious disease following


-Blood cultures negative


-Continue Zosyn, improving clinically


-Slowly advance diet, on full liquids


-also on TPN, on fluconazole


-Pain control





Feet pain and edema, bilateral


- gout vs pseudogout vs immobilization vs venous stasis 


- xrays - no acute fracture, soft tissue swelling 


- ibuprofen for pain


- ortho consulted 





Impaired perfusion to the right upper pole kidney, seen on CT 


-Evaluated by urology, no further management needed





Morbid obesity


-Counseled regarding lifestyle modifications


-Needs outpatient follow





Generalized weakness


-PT/OT








F: TPN


E: Replete as needed


N: full liquids, and TPN


A: As tolerated





DVT ppx: Lovenox





Code status: Full code





Anticipated discharge place: Home


Anticipated discharge time: 2-3 days





Objective





- Vital Signs


Vital signs: 


                                   Vital Signs











Temp  98.5 F   09/15/22 11:34


 


Pulse  88   09/15/22 11:34


 


Resp  17   09/15/22 11:34


 


BP  143/88   09/15/22 11:34


 


Pulse Ox  95   09/15/22 11:34


 


FiO2      








                                 Intake & Output











 09/14/22 09/15/22 09/15/22





 18:59 06:59 18:59


 


Output Total 1700 1110 200


 


Balance -1700 -1110 -200


 


Output:   


 


  Drainage  10 


 


    Right Abdomen  10 


 


  Urine 1700 1100 


 


  Stool   200


 


Other:   


 


  Voiding Method Urinal Urinal 


 


  # Voids 4  4


 


  # Bowel Movements  1 














- Labs


CBC & Chem 7: 


                                 09/15/22 07:29





                                 09/15/22 07:29


Labs: 


                  Abnormal Lab Results - Last 24 Hours (Table)











  09/14/22 09/14/22 09/15/22 Range/Units





  06:07 18:06 00:13 


 


WBC     (4.50-10.00)  X 10*3/uL


 


RBC     (4.40-5.60)  X 10*6/uL


 


Hgb     (13.0-17.0)  g/dL


 


Hct     (39.6-50.0)  %


 


Immature Gran #     (0.00-0.04)  X 10*3/uL


 


Neutrophils #     (1.80-7.70)  X 10*3/uL


 


Monocytes #     (0.20-1.00)  X 10*3/uL


 


Eosinophils #     (0.04-0.35)  X 10*3/uL


 


Sodium     (137-145)  mmol/L


 


Chloride     ()  mmol/L


 


Glucose     (74-99)  mg/dL


 


POC Glucose (mg/dL)   128 H  146 H  ()  mg/dL


 


Uric Acid  3.0 L    (3.7-8.7)  mg/dL














  09/15/22 09/15/22 09/15/22 Range/Units





  06:14 07:29 07:29 


 


WBC   14.61 H   (4.50-10.00)  X 10*3/uL


 


RBC   4.04 L   (4.40-5.60)  X 10*6/uL


 


Hgb   12.3 L   (13.0-17.0)  g/dL


 


Hct   37.8 L   (39.6-50.0)  %


 


Immature Gran #   0.46 H   (0.00-0.04)  X 10*3/uL


 


Neutrophils #   10.84 H   (1.80-7.70)  X 10*3/uL


 


Monocytes #   1.39 H   (0.20-1.00)  X 10*3/uL


 


Eosinophils #   0.37 H   (0.04-0.35)  X 10*3/uL


 


Sodium    135 L  (137-145)  mmol/L


 


Chloride    96 L  ()  mmol/L


 


Glucose    147 H  (74-99)  mg/dL


 


POC Glucose (mg/dL)  131 H    ()  mg/dL


 


Uric Acid     (3.7-8.7)  mg/dL














  09/15/22 Range/Units





  11:35 


 


WBC   (4.50-10.00)  X 10*3/uL


 


RBC   (4.40-5.60)  X 10*6/uL


 


Hgb   (13.0-17.0)  g/dL


 


Hct   (39.6-50.0)  %


 


Immature Gran #   (0.00-0.04)  X 10*3/uL


 


Neutrophils #   (1.80-7.70)  X 10*3/uL


 


Monocytes #   (0.20-1.00)  X 10*3/uL


 


Eosinophils #   (0.04-0.35)  X 10*3/uL


 


Sodium   (137-145)  mmol/L


 


Chloride   ()  mmol/L


 


Glucose   (74-99)  mg/dL


 


POC Glucose (mg/dL)  148 H  ()  mg/dL


 


Uric Acid   (3.7-8.7)  mg/dL

## 2022-09-15 NOTE — P.PN
Subjective


Progress Note Date: 09/15/22





CHIEF COMPLAINT: Perforated Diverticulitis





HISTORY OF PRESENT ILLNESS: Patient is postop day #7 status post sigmoid 

colectomy with end colostomy for perforated sigmoid diverticulitis.  Patient 

complaining again mostly of feet pain.  Home reports that the pain is slightly 

less than yesterday.  Medicine service did add ibuprofen.  Patient continues to 

have improvement in his abdominal pain.  Ostomy is functioning.  Denies any 

nausea or vomiting.  BRYSON drain 10 mL serosanguineous output. afebrile.  Uric acid

low at 3.0





PHYSICAL EXAM: 


VITAL SIGNS: Reviewed.


GENERAL: Well-developed in no acute distress. 


ABDOMEN:  Soft.  Nondistended.  Incision clean dry and intact.  3 abdominal 

katherin with serosanguineous drainage.  stoma pink. stool  noted in ostomy bag


NEUROLOGIC: Alert and oriented. Cranial nerves II through XII grossly intact.


Extremities: patient is tenderness to palpation of the dorsal aspect of his 

feet.  Patient started to have erythema on the left great toe





ASSESSMENT: 


1.  Perforated sigmoid diverticulitis status post sigmoid colectomy with end 

colostomy


2.  Leukocytosis


3.  Moderate protein calorie malnutrition





PLAN: 


-Continue Full Liquid diet with no dairy products


-Medicine service to manage patient's bilateral feet pain and possible gout


-Continue pain medication 


-Continue IV antibiotics


-Encouraged patient to increase activity level


-Encouraged patient to use incentive spirometer


-GI prophylaxis Protonix and DVT prophylaxis subcu lovenox





Physician Assistant note has been reviewed by physician. Signing provider agrees

with the documented findings, assessment, and plan of care. 





I have personally seen and examined the patient, reviewed the NP /PAs history, 

exam and MDM and agree with the assessment and plan as written. Based on total 

visit time, I have performed more than 50% of the visit.





As above:  Patient still complaining of foot pain bilaterally.  More erythema 

over the toe.  He is tolerating his diet.  We'll advance to low fiber diet at 

this time.  Remove BRYSON drain.  Continue local wound care at wick sites.  Will 

begin low-dose IV steroids for suspected gout.





Objective





- Vital Signs


Vital signs: 


                                   Vital Signs











Temp  98.2 F   09/15/22 04:05


 


Pulse  84   09/15/22 04:05


 


Resp  16   09/15/22 04:05


 


BP  127/80   09/15/22 04:05


 


Pulse Ox  96   09/15/22 04:05


 


FiO2      








                                 Intake & Output











 09/14/22 09/15/22 09/15/22





 18:59 06:59 18:59


 


Output Total 1700 1110 


 


Balance -1700 -1110 


 


Output:   


 


  Drainage  10 


 


    Right Abdomen  10 


 


  Urine 1700 1100 


 


Other:   


 


  Voiding Method Urinal Urinal 


 


  # Voids 4  


 


  # Bowel Movements  1 














- Labs


CBC & Chem 7: 


                                 09/15/22 07:29





                                 09/15/22 07:29


Labs: 


                  Abnormal Lab Results - Last 24 Hours (Table)











  09/14/22 09/14/22 09/14/22 Range/Units





  06:07 06:07 11:50 


 


Sodium     (137-145)  mmol/L


 


Chloride     ()  mmol/L


 


Glucose  138 H    ()  mg/dL


 


POC Glucose (mg/dL)    131 H  ()  mg/dL


 


Uric Acid   3.0 L   (3.7-8.7)  mg/dL


 


Magnesium  2.6 H    (1.5-2.4)  mg/dL


 


C-Reactive Protein  5.90 H    (0.00-0.80)  mg/dL














  09/14/22 09/15/22 09/15/22 Range/Units





  18:06 00:13 06:14 


 


Sodium     (137-145)  mmol/L


 


Chloride     ()  mmol/L


 


Glucose     ()  mg/dL


 


POC Glucose (mg/dL)  128 H  146 H  131 H  ()  mg/dL


 


Uric Acid     (3.7-8.7)  mg/dL


 


Magnesium     (1.5-2.4)  mg/dL


 


C-Reactive Protein     (0.00-0.80)  mg/dL














  09/15/22 Range/Units





  07:29 


 


Sodium  135 L  (137-145)  mmol/L


 


Chloride  96 L  ()  mmol/L


 


Glucose  147 H  ()  mg/dL


 


POC Glucose (mg/dL)   ()  mg/dL


 


Uric Acid   (3.7-8.7)  mg/dL


 


Magnesium   (1.5-2.4)  mg/dL


 


C-Reactive Protein   (0.00-0.80)  mg/dL

## 2022-09-16 LAB
ANION GAP SERPL CALC-SCNC: 14 MMOL/L
BASOPHILS # BLD AUTO: 0.03 X 10*3/UL (ref 0–0.1)
BASOPHILS NFR BLD AUTO: 0.2 %
BUN SERPL-SCNC: 21 MG/DL (ref 9–20)
CALCIUM SPEC-MCNC: 9.7 MG/DL (ref 8.4–10.2)
CHLORIDE SERPL-SCNC: 96 MMOL/L (ref 98–107)
CO2 SERPL-SCNC: 26 MMOL/L (ref 22–30)
EOSINOPHIL # BLD AUTO: 0.04 X 10*3/UL (ref 0.04–0.35)
EOSINOPHIL NFR BLD AUTO: 0.2 %
ERYTHROCYTE [DISTWIDTH] IN BLOOD BY AUTOMATED COUNT: 4.18 X 10*6/UL (ref 4.4–5.6)
ERYTHROCYTE [DISTWIDTH] IN BLOOD: 13.2 % (ref 11.5–14.5)
GLUCOSE BLD-MCNC: 128 MG/DL (ref 70–110)
GLUCOSE BLD-MCNC: 140 MG/DL (ref 70–110)
GLUCOSE SERPL-MCNC: 137 MG/DL (ref 74–99)
HCT VFR BLD AUTO: 38.9 % (ref 39.6–50)
HGB BLD-MCNC: 12.9 G/DL (ref 13–17)
IMM GRANULOCYTES BLD QL AUTO: 1.2 %
LYMPHOCYTES # SPEC AUTO: 0.97 X 10*3/UL (ref 0.9–5)
LYMPHOCYTES NFR SPEC AUTO: 5.3 %
MAGNESIUM SPEC-SCNC: 2.1 MG/DL (ref 1.6–2.3)
MCH RBC QN AUTO: 30.9 PG (ref 27–32)
MCHC RBC AUTO-ENTMCNC: 33.2 G/DL (ref 32–37)
MCV RBC AUTO: 93.1 FL (ref 80–97)
MONOCYTES # BLD AUTO: 0.4 X 10*3/UL (ref 0.2–1)
MONOCYTES NFR BLD AUTO: 2.2 %
NEUTROPHILS # BLD AUTO: 16.59 X 10*3/UL (ref 1.8–7.7)
NEUTROPHILS NFR BLD AUTO: 90.9 %
NRBC BLD AUTO-RTO: 0 /100 WBCS (ref 0–0)
PLATELET # BLD AUTO: 474 X 10*3/UL (ref 140–440)
POTASSIUM SERPL-SCNC: 5.3 MMOL/L (ref 3.5–5.1)
SODIUM SERPL-SCNC: 136 MMOL/L (ref 137–145)
WBC # BLD AUTO: 18.25 X 10*3/UL (ref 4.5–10)

## 2022-09-16 RX ADMIN — DEXTROSE MONOHYDRATE, SODIUM CHLORIDE, AND POTASSIUM CHLORIDE SCH: 50; 4.5; 1.49 INJECTION, SOLUTION INTRAVENOUS at 06:56

## 2022-09-16 RX ADMIN — CEFAZOLIN SCH: 330 INJECTION, POWDER, FOR SOLUTION INTRAMUSCULAR; INTRAVENOUS at 17:17

## 2022-09-16 RX ADMIN — DOCUSATE SODIUM SCH MG: 100 CAPSULE, LIQUID FILLED ORAL at 09:09

## 2022-09-16 RX ADMIN — SIMETHICONE SCH MG: 20 SUSPENSION/ DROPS ORAL at 17:29

## 2022-09-16 RX ADMIN — ENOXAPARIN SODIUM SCH MG: 40 INJECTION SUBCUTANEOUS at 09:08

## 2022-09-16 RX ADMIN — METHYLPREDNISOLONE SODIUM SUCCINATE SCH MG: 125 INJECTION, POWDER, FOR SOLUTION INTRAMUSCULAR; INTRAVENOUS at 09:09

## 2022-09-16 RX ADMIN — HYDROCODONE BITARTRATE AND ACETAMINOPHEN PRN EACH: 5; 325 TABLET ORAL at 09:07

## 2022-09-16 RX ADMIN — HYDROCODONE BITARTRATE AND ACETAMINOPHEN PRN EACH: 5; 325 TABLET ORAL at 17:29

## 2022-09-16 RX ADMIN — HYDROCODONE BITARTRATE AND ACETAMINOPHEN PRN EACH: 5; 325 TABLET ORAL at 03:49

## 2022-09-16 RX ADMIN — PIPERACILLIN AND TAZOBACTAM SCH MLS/HR: 3; .375 INJECTION, POWDER, FOR SOLUTION INTRAVENOUS at 05:31

## 2022-09-16 RX ADMIN — IBUPROFEN SCH MG: 800 TABLET, FILM COATED ORAL at 15:53

## 2022-09-16 RX ADMIN — SIMETHICONE SCH MG: 20 SUSPENSION/ DROPS ORAL at 06:11

## 2022-09-16 RX ADMIN — HYDROMORPHONE HYDROCHLORIDE PRN MG: 1 INJECTION, SOLUTION INTRAMUSCULAR; INTRAVENOUS; SUBCUTANEOUS at 21:05

## 2022-09-16 RX ADMIN — PIPERACILLIN AND TAZOBACTAM SCH MLS/HR: 3; .375 INJECTION, POWDER, FOR SOLUTION INTRAVENOUS at 19:52

## 2022-09-16 RX ADMIN — HYDROMORPHONE HYDROCHLORIDE PRN MG: 1 INJECTION, SOLUTION INTRAMUSCULAR; INTRAVENOUS; SUBCUTANEOUS at 06:07

## 2022-09-16 RX ADMIN — IBUPROFEN SCH MG: 800 TABLET, FILM COATED ORAL at 09:09

## 2022-09-16 RX ADMIN — SIMETHICONE SCH MG: 20 SUSPENSION/ DROPS ORAL at 21:07

## 2022-09-16 RX ADMIN — METHYLPREDNISOLONE SODIUM SUCCINATE SCH MG: 125 INJECTION, POWDER, FOR SOLUTION INTRAMUSCULAR; INTRAVENOUS at 21:04

## 2022-09-16 RX ADMIN — SIMETHICONE SCH: 20 SUSPENSION/ DROPS ORAL at 14:01

## 2022-09-16 RX ADMIN — LEUCINE, PHENYLALANINE, LYSINE, METHIONINE, ISOLEUCINE, VALINE, HISTIDINE, THREONINE, TRYPTOPHAN, ALANINE, GLYCINE, ARGININE, PROLINE, SERINE, TYROSINE, DEXTROSE SCH: 311; 238; 247; 170; 255; 247; 204; 179; 77; 880; 438; 489; 289; 213; 17; 10 INJECTION INTRAVENOUS at 06:55

## 2022-09-16 RX ADMIN — FLUCONAZOLE SCH MG: 100 TABLET ORAL at 09:10

## 2022-09-16 RX ADMIN — HYDROMORPHONE HYDROCHLORIDE PRN MG: 1 INJECTION, SOLUTION INTRAMUSCULAR; INTRAVENOUS; SUBCUTANEOUS at 12:51

## 2022-09-16 RX ADMIN — HYDROMORPHONE HYDROCHLORIDE PRN MG: 1 INJECTION, SOLUTION INTRAMUSCULAR; INTRAVENOUS; SUBCUTANEOUS at 00:29

## 2022-09-16 RX ADMIN — DOCUSATE SODIUM SCH MG: 100 CAPSULE, LIQUID FILLED ORAL at 21:04

## 2022-09-16 RX ADMIN — LEUCINE, PHENYLALANINE, LYSINE, METHIONINE, ISOLEUCINE, VALINE, HISTIDINE, THREONINE, TRYPTOPHAN, ALANINE, GLYCINE, ARGININE, PROLINE, SERINE, TYROSINE, DEXTROSE SCH MLS/HR: 311; 238; 247; 170; 255; 247; 204; 179; 77; 880; 438; 489; 289; 213; 17; 10 INJECTION INTRAVENOUS at 00:18

## 2022-09-16 RX ADMIN — IBUPROFEN SCH MG: 800 TABLET, FILM COATED ORAL at 21:04

## 2022-09-16 RX ADMIN — PANTOPRAZOLE SODIUM SCH MG: 40 INJECTION, POWDER, FOR SOLUTION INTRAVENOUS at 09:08

## 2022-09-16 RX ADMIN — PIPERACILLIN AND TAZOBACTAM SCH MLS/HR: 3; .375 INJECTION, POWDER, FOR SOLUTION INTRAVENOUS at 12:46

## 2022-09-16 NOTE — P.PN
Subjective


Progress Note Date: 09/14/22


Principal diagnosis: 


Acute complicated diverticulitis





Patient is a 41-year-old  male presented to the hospital with abdominal

pain with a recent diagnosis of diverticulitis failing outpatient oral Augmentin

therapy with repeat CAT scan shows perforation.  Patient was taken to the OR 

09/07/2022 status post laparotomy and diverting colostomy


 on today's evaluation that is 09/14/2022, the patient remains to be afebrile, 

the patient abdominal pain is controlled , the patient is breathing comfortably 

on room air, patient denies nausea, vomiting, the patient did have output in his

colostomy bag, the patient denies chest pain shortness of breath or cough, 

patient complaining of pain mostly in the feet area concerning for gout





Objective





- Vital Signs


Vital signs: 


                                   Vital Signs











Temp  98.5 F   09/14/22 11:34


 


Pulse  88   09/14/22 11:34


 


Resp  18   09/14/22 11:34


 


BP  128/81   09/14/22 11:34


 


Pulse Ox  96   09/14/22 11:34


 


FiO2      








                                 Intake & Output











 09/13/22 09/14/22 09/14/22





 18:59 06:59 18:59


 


Intake Total 1000 1011 


 


Output Total 670 1895 1100


 


Balance 330 -594 -1100


 


Intake:   


 


  Intake, IV Titration 1000 1011 





  Amount   


 


    Amino Acid 5%-D20w+Lytes* 1000  





    E* 1,000 ml @ 100 mls/hr   





    IV .BY DURATION [facility] Rx#:   





    [id number]   


 


    Mvi, Adult No.4 with Vit  1011 





    K 10 ml Trace (Conc-1Ml/   





    Dose) 1 ml In Amino Acid   





    5%-D20w+Lytes*E* 1,000 ml   





    @ 100 mls/hr IV .BY   





    DURATION [facility] Rx#:   





    [id number]   


 


Output:   


 


  Drainage 20 20 


 


    Right Abdomen 20 20 


 


  Urine 650 1875 1100


 


Other:   


 


  Voiding Method Toilet Toilet Urinal





 Urinal Urinal 


 


  # Voids 3 3 2














- Exam


GENERAL DESCRIPTION: Middle-aged male lying in bed in no distress





RESPIRATORY SYSTEM: Unlabored breathing , decreased breath sounds at bases





HEART: S1 S2 regular rate and rhythm ,





ABDOMEN: Soft , no tenderness





EXTREMITIES: No edema feet








- Labs


CBC & Chem 7: 


                                 09/16/22 07:01





                                 09/16/22 09:47


Labs: 


                  Abnormal Lab Results - Last 24 Hours (Table)











  09/13/22 09/13/22 09/14/22 Range/Units





  06:11 17:11 00:03 


 


WBC     (4.50-10.00)  X 10*3/uL


 


RBC     (4.40-5.60)  X 10*6/uL


 


Hgb     (13.0-17.0)  g/dL


 


Hct     (39.6-50.0)  %


 


Plt Count     (140-440)  X 10*3/uL


 


Immature Gran #     (0.00-0.04)  X 10*3/uL


 


Neutrophils #     (1.80-7.70)  X 10*3/uL


 


Monocytes #     (0.20-1.00)  X 10*3/uL


 


Eosinophils #     (0.04-0.35)  X 10*3/uL


 


Glucose  120 H    ()  mg/dL


 


POC Glucose (mg/dL)   125 H  137 H  ()  mg/dL


 


Magnesium     (1.5-2.4)  mg/dL


 


C-Reactive Protein     (0.00-0.80)  mg/dL














  09/14/22 09/14/22 09/14/22 Range/Units





  05:17 06:07 06:07 


 


WBC    15.36 H  (4.50-10.00)  X 10*3/uL


 


RBC    4.07 L  (4.40-5.60)  X 10*6/uL


 


Hgb    12.4 L  (13.0-17.0)  g/dL


 


Hct    37.7 L  (39.6-50.0)  %


 


Plt Count    466 H  (140-440)  X 10*3/uL


 


Immature Gran #    0.45 H  (0.00-0.04)  X 10*3/uL


 


Neutrophils #    11.74 H  (1.80-7.70)  X 10*3/uL


 


Monocytes #    1.16 H  (0.20-1.00)  X 10*3/uL


 


Eosinophils #    0.38 H  (0.04-0.35)  X 10*3/uL


 


Glucose   138 H   ()  mg/dL


 


POC Glucose (mg/dL)  128 H    ()  mg/dL


 


Magnesium   2.6 H   (1.5-2.4)  mg/dL


 


C-Reactive Protein   5.90 H   (0.00-0.80)  mg/dL














  09/14/22 Range/Units





  11:50 


 


WBC   (4.50-10.00)  X 10*3/uL


 


RBC   (4.40-5.60)  X 10*6/uL


 


Hgb   (13.0-17.0)  g/dL


 


Hct   (39.6-50.0)  %


 


Plt Count   (140-440)  X 10*3/uL


 


Immature Gran #   (0.00-0.04)  X 10*3/uL


 


Neutrophils #   (1.80-7.70)  X 10*3/uL


 


Monocytes #   (0.20-1.00)  X 10*3/uL


 


Eosinophils #   (0.04-0.35)  X 10*3/uL


 


Glucose   ()  mg/dL


 


POC Glucose (mg/dL)  131 H  ()  mg/dL


 


Magnesium   (1.5-2.4)  mg/dL


 


C-Reactive Protein   (0.00-0.80)  mg/dL














Assessment and Plan


(1) Acute diverticulitis


Current Visit: Yes   Status: Acute   Code(s): K57.92 - DVTRCLI OF INTEST, PART 

UNSP, W/O PERF OR ABSCESS W/O BLEED   SNOMED Code(s): 099492873


   





(2) Failure of outpatient treatment


Current Visit: Yes   Status: Acute   Code(s): Z78.9 - OTHER SPECIFIED HEALTH 

STATUS   SNOMED Code(s): 986397531


   


Plan: 


1patient presented to hospital with sepsis in this patient did have a fever 

tachycardia elevated white count source is acute complicated diverticulitis with

perforation and will need to cover for the enteric gram-negative both aerobes 

and anaerobes.


2patient seemed to have failed medical therapy in this patient who is status 

post laparotomy and diverting colostomy, no OR culture were done


3-patient has shown some clinical improvement however the patient white count 

seems to be trending down with the addition of Diflucan to continue along with 

the Zosyn


Time with Patient: Less than 30

## 2022-09-16 NOTE — P.PN
Subjective


Progress Note Date: 09/15/22


Principal diagnosis: 


Acute complicated diverticulitis





Patient is a 41-year-old  male presented to the hospital with abdominal

pain with a recent diagnosis of diverticulitis failing outpatient oral Augmentin

therapy with repeat CAT scan shows perforation.  Patient was taken to the OR 

09/07/2022 status post laparotomy and diverting colostomy


 on today's evaluation that is 09/15/2022, the patient denies any fever or any 

chills, the patient is breathing comfortably on room air, patient denies nausea,

vomiting, the patient did have output in his colostomy bag, the patient denies 

chest pain shortness of breath or cough,





Objective





- Vital Signs


Vital signs: 


                                   Vital Signs











Temp  98.5 F   09/15/22 11:34


 


Pulse  88   09/15/22 11:34


 


Resp  17   09/15/22 11:34


 


BP  143/88   09/15/22 11:34


 


Pulse Ox  95   09/15/22 11:34


 


FiO2      








                                 Intake & Output











 09/14/22 09/15/22 09/15/22





 18:59 06:59 18:59


 


Output Total 1700 1110 200


 


Balance -1700 -1110 -200


 


Output:   


 


  Drainage  10 


 


    Right Abdomen  10 


 


  Urine 1700 1100 


 


  Stool   200


 


Other:   


 


  Voiding Method Urinal Urinal 


 


  # Voids 4  4


 


  # Bowel Movements  1 














- Exam


GENERAL DESCRIPTION: Middle-aged male lying in bed in no distress





RESPIRATORY SYSTEM: Unlabored breathing , decreased breath sounds at bases





HEART: S1 S2 regular rate and rhythm ,





ABDOMEN: Soft , no tenderness





EXTREMITIES: No edema feet








- Labs


CBC & Chem 7: 


                                 09/16/22 07:01





                                 09/16/22 09:47


Labs: 


                  Abnormal Lab Results - Last 24 Hours (Table)











  09/14/22 09/14/22 09/15/22 Range/Units





  06:07 18:06 00:13 


 


WBC     (4.50-10.00)  X 10*3/uL


 


RBC     (4.40-5.60)  X 10*6/uL


 


Hgb     (13.0-17.0)  g/dL


 


Hct     (39.6-50.0)  %


 


Immature Gran #     (0.00-0.04)  X 10*3/uL


 


Neutrophils #     (1.80-7.70)  X 10*3/uL


 


Monocytes #     (0.20-1.00)  X 10*3/uL


 


Eosinophils #     (0.04-0.35)  X 10*3/uL


 


Sodium     (137-145)  mmol/L


 


Chloride     ()  mmol/L


 


Glucose     (74-99)  mg/dL


 


POC Glucose (mg/dL)   128 H  146 H  ()  mg/dL


 


Uric Acid  3.0 L    (3.7-8.7)  mg/dL














  09/15/22 09/15/22 09/15/22 Range/Units





  06:14 07:29 07:29 


 


WBC   14.61 H   (4.50-10.00)  X 10*3/uL


 


RBC   4.04 L   (4.40-5.60)  X 10*6/uL


 


Hgb   12.3 L   (13.0-17.0)  g/dL


 


Hct   37.8 L   (39.6-50.0)  %


 


Immature Gran #   0.46 H   (0.00-0.04)  X 10*3/uL


 


Neutrophils #   10.84 H   (1.80-7.70)  X 10*3/uL


 


Monocytes #   1.39 H   (0.20-1.00)  X 10*3/uL


 


Eosinophils #   0.37 H   (0.04-0.35)  X 10*3/uL


 


Sodium    135 L  (137-145)  mmol/L


 


Chloride    96 L  ()  mmol/L


 


Glucose    147 H  (74-99)  mg/dL


 


POC Glucose (mg/dL)  131 H    ()  mg/dL


 


Uric Acid     (3.7-8.7)  mg/dL














  09/15/22 Range/Units





  11:35 


 


WBC   (4.50-10.00)  X 10*3/uL


 


RBC   (4.40-5.60)  X 10*6/uL


 


Hgb   (13.0-17.0)  g/dL


 


Hct   (39.6-50.0)  %


 


Immature Gran #   (0.00-0.04)  X 10*3/uL


 


Neutrophils #   (1.80-7.70)  X 10*3/uL


 


Monocytes #   (0.20-1.00)  X 10*3/uL


 


Eosinophils #   (0.04-0.35)  X 10*3/uL


 


Sodium   (137-145)  mmol/L


 


Chloride   ()  mmol/L


 


Glucose   (74-99)  mg/dL


 


POC Glucose (mg/dL)  148 H  ()  mg/dL


 


Uric Acid   (3.7-8.7)  mg/dL














Assessment and Plan


(1) Acute diverticulitis


Current Visit: Yes   Status: Acute   Code(s): K57.92 - DVTRCLI OF INTEST, PART 

UNSP, W/O PERF OR ABSCESS W/O BLEED   SNOMED Code(s): 803106047


   





(2) Failure of outpatient treatment


Current Visit: Yes   Status: Acute   Code(s): Z78.9 - OTHER SPECIFIED HEALTH 

STATUS   SNOMED Code(s): 988681001


   


Plan: 


1patient presented to hospital with sepsis in this patient did have a fever 

tachycardia elevated white count source is acute complicated diverticulitis with

perforation and will need to cover for the enteric gram-negative both aerobes 

and anaerobes.


2patient seemed to have failed medical therapy in this patient who is status 

post laparotomy and diverting colostomy, no OR culture were done


3-patient slowly clinically improving and the white count is trending down to 

14,000 today to continue with the Zosyn and Diflucan


Time with Patient: Less than 30

## 2022-09-16 NOTE — P.PN
Subjective


Progress Note Date: 09/16/22





CHIEF COMPLAINT: Perforated Diverticulitis





HISTORY OF PRESENT ILLNESS: Patient is postop day #8 status post sigmoid 

colectomy with end colostomy for perforated sigmoid diverticulitis.  Patient 

started on IV steroids yesterday for possible gout in his feet.  Patient also on

Motrin per medicine service.  Patient starting to report improvement in his 

feet.  He was able to ambulate in the hallway.  His abdominal pain is controlled

and improving each day.  His ostomy is functioning.  Denies any nausea vomiting.

 Didn't tolerate a full liquid diet this morning with some toast.  TPN was 

discontinued yesterday.  BRYSON drain discontinued yesterday.  Afebrile.  WBC is up 

from 14.61-18.25 this could be related to patient's IV steroids.  Hemoglobin 

12.9





Patient seen and examined with Dr. sierra who is covering for Dr. Neal





PHYSICAL EXAM: 


VITAL SIGNS: Reviewed.


GENERAL: Well-developed in no acute distress. 


ABDOMEN:  Soft.  Nondistended.  Incision clean dry and intact.  3 abdominal 

katherin with serosanguineous drainage.  stoma pink. stool noted in ostomy bag


NEUROLOGIC: Alert and oriented. Cranial nerves II through XII grossly intact.


Extremities: patient has less tenderness to palpation of the dorsal aspect of 

his feet.  The erythema on the dorsal aspect of feet and left great toe have 

decreased.  Swelling around left great toe also decreased.





ASSESSMENT: 


1.  Perforated sigmoid diverticulitis status post sigmoid colectomy with end 

colostomy


2.  Leukocytosis


3.  Moderate protein calorie malnutrition





PLAN: 


-Advance diet to low fiber with no dairy products


-Patient is stable for discharge tomorrow from surgical standpoint


-Discharge antibiotics per ID service


-Hep-Lock IV fluids


-Management of gout per medicine service


-Continue IV steroids for gout.  Further management with steroids per medicine 

service


-Continue pain medication 


-Continue IV antibiotics


-Encouraged patient to increase activity level


-Encouraged patient to use incentive spirometer


-GI prophylaxis Protonix and DVT prophylaxis subcu lovenox





Physician Assistant note has been reviewed by physician. Signing provider agrees

with the documented findings, assessment, and plan of care. 





Objective





- Vital Signs


Vital signs: 


                                   Vital Signs











Temp  98.6 F   09/16/22 13:00


 


Pulse  96   09/16/22 13:00


 


Resp  16   09/16/22 13:00


 


BP  132/80   09/16/22 13:00


 


Pulse Ox  97   09/16/22 13:00


 


FiO2      








                                 Intake & Output











 09/15/22 09/16/22 09/16/22





 18:59 06:59 18:59


 


Output Total 350 1900 100


 


Balance -350 -1900 -100


 


Weight 138.346 kg  138.346 kg


 


Output:   


 


  Urine  1750 


 


  Stool 350 150 100


 


Other:   


 


  Voiding Method  Urinal Urinal


 


  # Voids 3 4 


 


  # Bowel Movements  1 














- Labs


CBC & Chem 7: 


                                 09/16/22 07:01





                                 09/16/22 09:47


Labs: 


                  Abnormal Lab Results - Last 24 Hours (Table)











  09/16/22 09/16/22 09/16/22 Range/Units





  05:57 07:01 07:01 


 


WBC    18.25 H  (4.50-10.00)  X 10*3/uL


 


RBC    4.18 L  (4.40-5.60)  X 10*6/uL


 


Hgb    12.9 L  (13.0-17.0)  g/dL


 


Hct    38.9 L  (39.6-50.0)  %


 


Plt Count    474 H  (140-440)  X 10*3/uL


 


Immature Gran #    0.22 H  (0.00-0.04)  X 10*3/uL


 


Neutrophils #    16.59 H  (1.80-7.70)  X 10*3/uL


 


Sodium   136 L   (137-145)  mmol/L


 


Potassium   5.3 H   (3.5-5.1)  mmol/L


 


Chloride   96 L   ()  mmol/L


 


BUN   21 H   (9-20)  mg/dL


 


Glucose   137 H   (74-99)  mg/dL


 


POC Glucose (mg/dL)  140 H    ()  mg/dL














  09/16/22 Range/Units





  12:41 


 


WBC   (4.50-10.00)  X 10*3/uL


 


RBC   (4.40-5.60)  X 10*6/uL


 


Hgb   (13.0-17.0)  g/dL


 


Hct   (39.6-50.0)  %


 


Plt Count   (140-440)  X 10*3/uL


 


Immature Gran #   (0.00-0.04)  X 10*3/uL


 


Neutrophils #   (1.80-7.70)  X 10*3/uL


 


Sodium   (137-145)  mmol/L


 


Potassium   (3.5-5.1)  mmol/L


 


Chloride   ()  mmol/L


 


BUN   (9-20)  mg/dL


 


Glucose   (74-99)  mg/dL


 


POC Glucose (mg/dL)  128 H  ()  mg/dL

## 2022-09-16 NOTE — P.PN
Subjective


Progress Note Date: 09/16/22


Principal diagnosis: 





abdominal pain





Hospital Course: 


41-year-old male without significant past medical history presenting with sepsis

secondary to acute perforated sigmoid diverticulitis.  Patient is status post 

sigmoid colectomy with end colostomy.  Infectious disease also following 

patient.  Patient remains on Zosyn with clinical improvement.  Currently to

lerating low fiber diet. Likely discharge tomorrow. 








Subjective: 


Patient seen and examined at bedside.  No acute events overnight.  Patient 

claims that his oral intake is slowly improving. Abdominal pain improving. Foot 

pain improving. He denies any chest pain or shortness of breath or urinary co

mplaints.





Pertinent positives and negatives as discussed above, a complete review of 

systems was performed and all other systems are negative.








Vitals Signs Reviewed. 





General: nontoxic, no distress, appears at stated age


Derm: warm, dry


Head: atraumatic, normocephalic, symmetric


Eyes: EOMI, no lid lag, anicteric sclera


Mouth: no lip lesion, mucus membranes moist


Cardiovascular: S1S2 reg, no murmur


Lungs: CTA bilateral, no rhonchi, no rales , no accessory muscle use


Abdominal: soft, mild tenderness to palpation all quadrants, no guarding, no 

appreciable organomegaly, midline incision covered in dressing, left sided 

colostomy with stool


Ext: no gross muscle atrophy, no contractures, bilateral dorsal foot tenderness 

and swelling, no MTP swelling bilaterally


Neuro:  CN II-XI grossly intact, no focal neuro deficits


Psych: Alert, oriented, appropriate affect 








Assessment and Plan:





Sepsis


Perforated sigmoid diverticulitis with pneumoperitoneum


Leukocytosis


-Status post sigmoid colectomy with end colostomy on 9/7


-Surgery following


-Infectious disease following


-Blood cultures negative


-Continue Zosyn, improving clinically


-Slowly advance diet, on low fiber diet


- on fluconazole per ID


- off TPN


-Pain control





Feet pain and edema, bilateral


- gout vs pseudogout vs immobilization vs venous stasis 


- xrays - no acute fracture, soft tissue swelling 


- ibuprofen for pain


- steroids as well 


- unlikely to be gout given bilateral swelling and no particular joint 

involvement, appears more dependent edema improving with elevation 


- ortho consulted - no interventions 


- continue current therapy 


- will benefit from uric acid levels as outpatient, and consideration of 

allupurinol if elevated 





Impaired perfusion to the right upper pole kidney, seen on CT 


-Evaluated by urology, no further management needed





Morbid obesity


-Counseled regarding lifestyle modifications


-Needs outpatient follow





Generalized weakness


-PT/OT








F: oral 


E: Replete as needed


N: low fiber diet 


A: As tolerated





DVT ppx: Lovenox





Code status: Full code





Anticipated discharge place: Home


Anticipated discharge time: tomorrow 





Objective





- Vital Signs


Vital signs: 


                                   Vital Signs











Temp  98.6 F   09/16/22 13:00


 


Pulse  96   09/16/22 13:00


 


Resp  16   09/16/22 13:00


 


BP  132/80   09/16/22 13:00


 


Pulse Ox  97   09/16/22 13:00


 


FiO2      








                                 Intake & Output











 09/15/22 09/16/22 09/16/22





 18:59 06:59 18:59


 


Output Total 350 1900 100


 


Balance -350 -1900 -100


 


Weight 138.346 kg  138.346 kg


 


Output:   


 


  Urine  1750 


 


  Stool 350 150 100


 


Other:   


 


  Voiding Method  Urinal Urinal


 


  # Voids 3 4 


 


  # Bowel Movements  1 














- Labs


CBC & Chem 7: 


                                 09/16/22 07:01





                                 09/16/22 09:47


Labs: 


                  Abnormal Lab Results - Last 24 Hours (Table)











  09/16/22 09/16/22 09/16/22 Range/Units





  05:57 07:01 07:01 


 


WBC    18.25 H  (4.50-10.00)  X 10*3/uL


 


RBC    4.18 L  (4.40-5.60)  X 10*6/uL


 


Hgb    12.9 L  (13.0-17.0)  g/dL


 


Hct    38.9 L  (39.6-50.0)  %


 


Plt Count    474 H  (140-440)  X 10*3/uL


 


Immature Gran #    0.22 H  (0.00-0.04)  X 10*3/uL


 


Neutrophils #    16.59 H  (1.80-7.70)  X 10*3/uL


 


Sodium   136 L   (137-145)  mmol/L


 


Potassium   5.3 H   (3.5-5.1)  mmol/L


 


Chloride   96 L   ()  mmol/L


 


BUN   21 H   (9-20)  mg/dL


 


Glucose   137 H   (74-99)  mg/dL


 


POC Glucose (mg/dL)  140 H    ()  mg/dL














  09/16/22 Range/Units





  12:41 


 


WBC   (4.50-10.00)  X 10*3/uL


 


RBC   (4.40-5.60)  X 10*6/uL


 


Hgb   (13.0-17.0)  g/dL


 


Hct   (39.6-50.0)  %


 


Plt Count   (140-440)  X 10*3/uL


 


Immature Gran #   (0.00-0.04)  X 10*3/uL


 


Neutrophils #   (1.80-7.70)  X 10*3/uL


 


Sodium   (137-145)  mmol/L


 


Potassium   (3.5-5.1)  mmol/L


 


Chloride   ()  mmol/L


 


BUN   (9-20)  mg/dL


 


Glucose   (74-99)  mg/dL


 


POC Glucose (mg/dL)  128 H  ()  mg/dL

## 2022-09-16 NOTE — P.PN
Subjective


Progress Note Date: 09/16/22


Principal diagnosis: 


Acute complicated diverticulitis





Patient is a 41-year-old  male presented to the hospital with abdominal

pain with a recent diagnosis of diverticulitis failing outpatient oral Augmentin

therapy with repeat CAT scan shows perforation.  Patient was taken to the OR 

09/07/2022 status post laparotomy and diverting colostomy


 on today's evaluation that is 09/16/2022, the patient remains to be afebrile, 

the patient is breathing comfortably on room air, patient denies nausea, 

vomiting, the patient did have output in his colostomy bag and abdominal pain is

currently controlled, the patient denies chest pain shortness of breath or 

cough,





Objective





- Vital Signs


Vital signs: 


                                   Vital Signs











Temp  97.6 F   09/16/22 04:05


 


Pulse  77   09/16/22 04:05


 


Resp  16   09/16/22 04:05


 


BP  141/85   09/16/22 04:05


 


Pulse Ox  94 L  09/16/22 04:05


 


FiO2      








                                 Intake & Output











 09/15/22 09/16/22 09/16/22





 18:59 06:59 18:59


 


Output Total 350 1900 100


 


Balance -350 -1900 -100


 


Weight 138.346 kg  138.346 kg


 


Output:   


 


  Urine  1750 


 


  Stool 350 150 100


 


Other:   


 


  Voiding Method  Urinal Urinal


 


  # Voids 3 4 


 


  # Bowel Movements  1 














- Exam


GENERAL DESCRIPTION: Middle-aged male lying in bed in no distress





RESPIRATORY SYSTEM: Unlabored breathing , decreased breath sounds at bases





HEART: S1 S2 regular rate and rhythm ,





ABDOMEN: Soft , no tenderness





EXTREMITIES: No edema feet








- Labs


CBC & Chem 7: 


                                 09/16/22 07:01





                                 09/16/22 09:47


Labs: 


                  Abnormal Lab Results - Last 24 Hours (Table)











  09/16/22 09/16/22 09/16/22 Range/Units





  05:57 07:01 07:01 


 


WBC    18.25 H  (4.50-10.00)  X 10*3/uL


 


RBC    4.18 L  (4.40-5.60)  X 10*6/uL


 


Hgb    12.9 L  (13.0-17.0)  g/dL


 


Hct    38.9 L  (39.6-50.0)  %


 


Plt Count    474 H  (140-440)  X 10*3/uL


 


Immature Gran #    0.22 H  (0.00-0.04)  X 10*3/uL


 


Neutrophils #    16.59 H  (1.80-7.70)  X 10*3/uL


 


Sodium   136 L   (137-145)  mmol/L


 


Potassium   5.3 H   (3.5-5.1)  mmol/L


 


Chloride   96 L   ()  mmol/L


 


BUN   21 H   (9-20)  mg/dL


 


Glucose   137 H   (74-99)  mg/dL


 


POC Glucose (mg/dL)  140 H    ()  mg/dL














Assessment and Plan


(1) Acute diverticulitis


Current Visit: Yes   Status: Acute   Code(s): K57.92 - DVTRCLI OF INTEST, PART 

UNSP, W/O PERF OR ABSCESS W/O BLEED   SNOMED Code(s): 891140715


   





(2) Failure of outpatient treatment


Current Visit: Yes   Status: Acute   Code(s): Z78.9 - OTHER SPECIFIED HEALTH 

STATUS   SNOMED Code(s): 423091893


   


Plan: 


1patient presented to hospital with sepsis in this patient did have a fever 

tachycardia elevated white count source is acute complicated diverticulitis with

perforation and will need to cover for the enteric gram-negative both aerobes 

and anaerobes.


2patient seemed to have failed medical therapy in this patient who is status 

post laparotomy and diverting colostomy, no OR culture were done


3-patient slowly clinically improving and the white count is trending down 

however is slightly up and more likely because of steroid effect


4-patient is covered with the Diflucan and Zosyn to continue for about a week on

discharge prescription was provided to the 


Time with Patient: Less than 30

## 2022-09-17 VITALS
RESPIRATION RATE: 20 BRPM | HEART RATE: 89 BPM | DIASTOLIC BLOOD PRESSURE: 76 MMHG | TEMPERATURE: 98.7 F | SYSTOLIC BLOOD PRESSURE: 137 MMHG

## 2022-09-17 RX ADMIN — PANTOPRAZOLE SODIUM SCH MG: 40 INJECTION, POWDER, FOR SOLUTION INTRAVENOUS at 08:42

## 2022-09-17 RX ADMIN — FLUCONAZOLE SCH MG: 100 TABLET ORAL at 08:42

## 2022-09-17 RX ADMIN — HYDROCODONE BITARTRATE AND ACETAMINOPHEN PRN EACH: 5; 325 TABLET ORAL at 06:11

## 2022-09-17 RX ADMIN — PIPERACILLIN AND TAZOBACTAM SCH MLS/HR: 3; .375 INJECTION, POWDER, FOR SOLUTION INTRAVENOUS at 04:38

## 2022-09-17 RX ADMIN — SIMETHICONE SCH MG: 20 SUSPENSION/ DROPS ORAL at 08:43

## 2022-09-17 RX ADMIN — IBUPROFEN SCH MG: 800 TABLET, FILM COATED ORAL at 08:40

## 2022-09-17 RX ADMIN — DOCUSATE SODIUM SCH MG: 100 CAPSULE, LIQUID FILLED ORAL at 08:41

## 2022-09-17 RX ADMIN — ENOXAPARIN SODIUM SCH: 40 INJECTION SUBCUTANEOUS at 08:42

## 2022-09-17 RX ADMIN — HYDROCODONE BITARTRATE AND ACETAMINOPHEN PRN EACH: 5; 325 TABLET ORAL at 02:32

## 2022-09-17 RX ADMIN — METHYLPREDNISOLONE SODIUM SUCCINATE SCH MG: 125 INJECTION, POWDER, FOR SOLUTION INTRAMUSCULAR; INTRAVENOUS at 08:42

## 2022-09-17 NOTE — P.DS
Providers


Date of admission: 


08/31/22 15:45





Expected date of discharge: 09/17/22


Attending physician: 


Donnie Ramirez MD





Consults: 





                                        





09/01/22 08:37


Consult Physician Routine 


   Consulting Provider: Angel Castañeda


   Consult Reason/Comments: Failed outpatient antibiotics diverticulitis


   Do you want consulting provider notified?: Yes





09/07/22 17:26


Consult Physician Routine 


   Consulting Provider: John Simmons


   Consult Reason/Comments: Abnormal right kidney on CAT scan


   Do you want consulting provider notified?: Yes, Notify in am





09/15/22 12:29


Consult Physician Routine 


   Consulting Provider: Jh Chacon


   Consult Reason/Comments: left ankle/foot edema and pain, unsure if there is 

joint


                                                involvement


   Do you want consulting provider notified?: Yes











Primary care physician: 


Kale MABRY [facility] Course: 





Discharge Diagnosis:


Sepsis


Perforated sigmoid diverticulitis with pneumoperitoneum


Leukocytosis


Bilateral pedal edema and pain


Impaired perfusion to the right upper pole kidney


Morbid obesity


Generalized weakness





Hospital Course: 


41-year-old male without significant past medical history who initially 

presented with acute sigmoid diverticulitis.  He failed outpatient therapy with 

Augmentin.  Patient again presented with worsening fevers and chills.  Repeat CT

abdomen pelvis showed perforated sigmoid diverticulitis with pneumoperitoneum.  

Surgery and infectious disease consultation.  Patient opted for conservative 

management initially.  Despite being on IV antibiotics, patient continued to 

have worsening abdominal pain and repeat CT demonstrated worsening abscess in 

the sigmoid colon region.  He then had exploratory laparotomy with sigmoid 

colectomy and end colostomy on 9/7.  One of the CT imaging, also demonstrated 

possible impaired perfusion to the right upper pole of kidney.  Urology was 

consulted, transient finding likely secondary to infection extending into the 

retroperitoneum, no intervention per urology recommended.  After surgery, 

patient was briefly on TPN and his diet was slowly advanced.  At discharge, 

patient was able to tolerate low fiber diet.  Towards the end of his 

hospitalization he did have bilateral pedal edema as well as pain concerning for

possible gouty arthritis.  He was evaluated by orthopedics as well.  X-ray 

findings were not consistent with any signs of joint involvement.  Significant 

pedal edema likely secondary to venous stasis and dependency.  However patient 

did improve with short course of steroids and NSAIDs.  At discharge he will be 

completing another 7 days of IV Zosyn and oral fluconazole.  We will also 

discharge with a total of 5 days of steroids as longer steroid duration might 

hinder with wound healing.





Patient will follow up with PCP as well as surgery in the outpatient setting.








Patient seen and examined at bedside.





Vital signs reviewed and stable. 


General: nontoxic, no distress, appears at stated age


Derm: warm, dry


Head: atraumatic, normocephalic, symmetric


Eyes: EOMI, no lid lag, anicteric sclera


Mouth: no lip lesion, mucus membranes moist


Cardiovascular: S1S2 reg, no murmur


Lungs: CTA bilateral, no rhonchi, no rales , no accessory muscle use


Abdominal: soft, mild tenderness to palpation all quadrants, no guarding, no 

appreciable organomegaly, midline incision covered in dressing, left sided 

colostomy with stool


Ext: no gross muscle atrophy, no contractures, bilateral dorsal foot improving 

tenderness and swelling, no MTP swelling bilaterally


Neuro:  CN II-XI grossly intact, no focal neuro deficits


Psych: Alert, oriented, appropriate affect 








A total of 46 minutes of time were spent preparing this complex discharge 

summary.


Patient was discharged on 9/17/22 at 8:21. 


Patient Condition at Discharge: Fair





Plan - Discharge Summary


Discharge Rx Participant: No


New Discharge Prescriptions: 


New


   Fluconazole [Diflucan] 200 mg PO DAILY #14 tab


   Ibuprofen [Motrin] 800 mg PO TID #20 tab


   Piperacillin-Tazobactam [Zosyn] 3.375 gm IVPB Q8H  each


   HYDROcodone/APAP 5-325MG [Norco 5-325] 1 tab PO Q6HR PRN 3 Days #12 tab


     PRN Reason: Pain


   predniSONE [Deltasone] 40 mg PO DAILY #8 tab





Continue


   Omeprazole 20 mg PO DAILY


   Ondansetron Odt [Zofran ODT] 4 mg PO Q8HR PRN #10 tab


     PRN Reason: Nausea


   Testosterone Cypionate [Depo-Testosterone] 200 mg IM Q14D





Discontinued


   Amoxic-Pot Clav 875-125Mg [Augmentin 875-125] 1 tab PO Q12HR #20 tab


Discharge Medication List





Omeprazole 20 mg PO DAILY 06/14/21 [History]


Ondansetron Odt [Zofran ODT] 4 mg PO Q8HR PRN #10 tab 08/30/22 [Rx]


Testosterone Cypionate [Depo-Testosterone] 200 mg IM Q14D 08/30/22 [History]


HYDROcodone/APAP 5-325MG [Norco 5-325] 1 tab PO Q6HR PRN 3 Days #12 tab 09/16/22

[Rx]


Fluconazole [Diflucan] 200 mg PO DAILY #14 tab 09/17/22 [Rx]


Ibuprofen [Motrin] 800 mg PO TID #20 tab 09/17/22 [Rx]


Piperacillin-Tazobactam [Zosyn] 3.375 gm IVPB Q8H  each 09/17/22 [Rx]


predniSONE [Deltasone] 40 mg PO DAILY #8 tab 09/17/22 [Rx]








Follow up Appointment(s)/Referral(s): 


Alan Neal MD [Medical Doctor] - 1 Week


Kale Bauman MD [Primary Care Provider] - 1-2 days


Patient Instructions/Handouts:  Diverticulitis (DC), Colostomy Care (GEN), 

Diverticulitis Diet (DC)


Activity/Diet/Wound Care/Special Instructions: 


Colostomy Care Recommendations for Home:


Last Pouching System change: 9.16.2022


Mr Pino will have the follow colostomy care supplies for home on discharge 

from [facility]:


[facility] one piece cut to fit pouching system #[id number] with filter (3)


[employer]vatec two piece flange moldable #[id number] (3)


Convatec pouch with filter #[id number] (3)


Ostomy powder (1)


No sting prep pads (12) 





Mr Pino is to empty the pouching system while sitting on the toilet or 

facing the toilet when the bag is 1/2 to 1/3 full until he transitions to 

disposable daily use pouches.


The entire pouching system is to be changed every 3- 5 days until he transitions

to daily disposable wear pouches 


Ostomy samples will be sent to Mr Pino after discharge from the hospital 

from [facility] in approximately 5-7 days from the day of discharge 





No driving while taking Norco


No lifting over 10 pounds


You may shower. No soaking or tub baths for 2 weeks


Very light activity until you are reevaluated at your follow up appointment with

your surgeon





Follow a low fiber diet until seen by surgeon








****NURSE TO CALL JIM LAWRENCE WHEN PT D/C SO THEY CAN RELEASE THE 

ANTIBIOTICS: call [phone]****


Discharge Disposition: HOME WITH HOME HEALTH SERVICES
Vomiting